# Patient Record
Sex: FEMALE | Race: WHITE | Employment: UNEMPLOYED | URBAN - METROPOLITAN AREA
[De-identification: names, ages, dates, MRNs, and addresses within clinical notes are randomized per-mention and may not be internally consistent; named-entity substitution may affect disease eponyms.]

---

## 2017-02-06 ENCOUNTER — CARE COORDINATION (OUTPATIENT)
Dept: CARE COORDINATION | Facility: CLINIC | Age: 57
End: 2017-02-06

## 2017-02-06 NOTE — PROGRESS NOTES
Clinic Care Coordination Contact  [facility]/Voicemail    Referral Source: CTS  Clinical Data: Care Coordinator Outreach  Outreach attempted x 1.  Left message on voicemail with call back information and requested return call.  Plan: Care Coordinator reminded Hanna in the voice message that she had selected Dr Rust as her PCP but she missed the appt 1/12/17 and hasn't called in to reschedule. Care Coordinator will outreach 1 more time and then change status to Inactive; Care Plan remains current.  .  Halima Sheldon [facility]  Clinic Care Coordinator-  Clinics: [place], [place], [place]  E-Mail: [email or web address]  Telephone: [phone]

## 2017-02-13 ENCOUNTER — CARE COORDINATION (OUTPATIENT)
Dept: CARE COORDINATION | Facility: CLINIC | Age: 57
End: 2017-02-13

## 2017-02-13 NOTE — PROGRESS NOTES
Clinic Care Coordination Contact  [facility]/Voicemail    Referral Source: CTS  Clinical Data: Care Coordinator Outreach  Outreach attempted x 3.  Left message on voicemail with call back information and welcomed a return call in the future.  Plan: Care Coordinator SW changing status to Inactive. [facility] and 24 Hour Access Plan have been mailed.  Halima Sheldon [facility]  Clinic Care Coordinator-  Clinics: [facility], [place], [place]  E-Mail: [email or web address]  Telephone: [phone]

## 2017-06-17 ENCOUNTER — HEALTH MAINTENANCE LETTER (OUTPATIENT)
Age: 57
End: 2017-06-17

## 2018-09-17 ENCOUNTER — HOSPITAL ENCOUNTER (EMERGENCY)
Facility: CLINIC | Age: 58
Discharge: HOME | End: 2018-09-17
Attending: EMERGENCY MEDICINE | Admitting: EMERGENCY MEDICINE
Payer: COMMERCIAL

## 2018-09-17 VITALS
TEMPERATURE: 98 F | HEIGHT: 71 IN | DIASTOLIC BLOOD PRESSURE: 95 MMHG | WEIGHT: 293 LBS | OXYGEN SATURATION: 91 % | RESPIRATION RATE: 22 BRPM | BODY MASS INDEX: 41.02 KG/M2 | SYSTOLIC BLOOD PRESSURE: 134 MMHG

## 2018-09-17 DIAGNOSIS — F33.1 MAJOR DEPRESSIVE DISORDER, RECURRENT EPISODE, MODERATE (H): ICD-10-CM

## 2018-09-17 DIAGNOSIS — F10.20 ALCOHOLISM (H): ICD-10-CM

## 2018-09-17 LAB
ALBUMIN SERPL-MCNC: 2.1 G/DL (ref 3.4–5)
ALBUMIN UR-MCNC: 100 MG/DL
ALP SERPL-CCNC: 210 U/L (ref 40–150)
ALT SERPL W P-5'-P-CCNC: 151 U/L (ref 0–50)
ANION GAP SERPL CALCULATED.3IONS-SCNC: 11 MMOL/L (ref 3–14)
APPEARANCE UR: ABNORMAL
AST SERPL W P-5'-P-CCNC: 232 U/L (ref 0–45)
BACTERIA #/AREA URNS HPF: ABNORMAL /HPF
BASOPHILS # BLD AUTO: 0 10E9/L (ref 0–0.2)
BASOPHILS NFR BLD AUTO: 0.1 %
BILIRUB DIRECT SERPL-MCNC: 0.5 MG/DL (ref 0–0.2)
BILIRUB SERPL-MCNC: 1.5 MG/DL (ref 0.2–1.3)
BILIRUB UR QL STRIP: ABNORMAL
BUN SERPL-MCNC: 24 MG/DL (ref 7–30)
CALCIUM SERPL-MCNC: 8.2 MG/DL (ref 8.5–10.1)
CHLORIDE SERPL-SCNC: 100 MMOL/L (ref 94–109)
CO2 SERPL-SCNC: 29 MMOL/L (ref 20–32)
COLOR UR AUTO: ABNORMAL
CREAT SERPL-MCNC: 0.9 MG/DL (ref 0.52–1.04)
DIFFERENTIAL METHOD BLD: ABNORMAL
EOSINOPHIL # BLD AUTO: 0 10E9/L (ref 0–0.7)
EOSINOPHIL NFR BLD AUTO: 0.3 %
ERYTHROCYTE [DISTWIDTH] IN BLOOD BY AUTOMATED COUNT: 15.6 % (ref 10–15)
GFR SERPL CREATININE-BSD FRML MDRD: 65 ML/MIN/1.7M2
GLUCOSE SERPL-MCNC: 151 MG/DL (ref 70–99)
GLUCOSE UR STRIP-MCNC: NEGATIVE MG/DL
HCT VFR BLD AUTO: 50 % (ref 35–47)
HGB BLD-MCNC: 16.3 G/DL (ref 11.7–15.7)
HGB UR QL STRIP: ABNORMAL
IMM GRANULOCYTES # BLD: 0.1 10E9/L (ref 0–0.4)
IMM GRANULOCYTES NFR BLD: 1.2 %
INR PPP: 0.97 (ref 0.86–1.14)
KETONES UR STRIP-MCNC: 5 MG/DL
LEUKOCYTE ESTERASE UR QL STRIP: ABNORMAL
LIPASE SERPL-CCNC: 124 U/L (ref 73–393)
LYMPHOCYTES # BLD AUTO: 1.1 10E9/L (ref 0.8–5.3)
LYMPHOCYTES NFR BLD AUTO: 12.7 %
MAGNESIUM SERPL-MCNC: 1 MG/DL (ref 1.6–2.3)
MCH RBC QN AUTO: 33.2 PG (ref 26.5–33)
MCHC RBC AUTO-ENTMCNC: 32.6 G/DL (ref 31.5–36.5)
MCV RBC AUTO: 102 FL (ref 78–100)
MONOCYTES # BLD AUTO: 0.8 10E9/L (ref 0–1.3)
MONOCYTES NFR BLD AUTO: 9.4 %
MUCOUS THREADS #/AREA URNS LPF: PRESENT /LPF
NEUTROPHILS # BLD AUTO: 6.6 10E9/L (ref 1.6–8.3)
NEUTROPHILS NFR BLD AUTO: 76.3 %
NITRATE UR QL: NEGATIVE
NRBC # BLD AUTO: 0 10*3/UL
NRBC BLD AUTO-RTO: 0 /100
PH UR STRIP: 6 PH (ref 5–7)
PLATELET # BLD AUTO: 228 10E9/L (ref 150–450)
POTASSIUM SERPL-SCNC: 4.4 MMOL/L (ref 3.4–5.3)
PROT SERPL-MCNC: 5.9 G/DL (ref 6.8–8.8)
RBC # BLD AUTO: 4.91 10E12/L (ref 3.8–5.2)
RBC #/AREA URNS AUTO: 0 /HPF (ref 0–2)
SODIUM SERPL-SCNC: 140 MMOL/L (ref 133–144)
SOURCE: ABNORMAL
SP GR UR STRIP: 1.03 (ref 1–1.03)
SQUAMOUS #/AREA URNS AUTO: 7 /HPF (ref 0–1)
UROBILINOGEN UR STRIP-MCNC: 4 MG/DL (ref 0–2)
WBC # BLD AUTO: 8.6 10E9/L (ref 4–11)
WBC #/AREA URNS AUTO: 12 /HPF (ref 0–5)

## 2018-09-17 PROCEDURE — 99284 EMERGENCY DEPT VISIT MOD MDM: CPT | Mod: 25

## 2018-09-17 PROCEDURE — 83690 ASSAY OF LIPASE: CPT | Performed by: EMERGENCY MEDICINE

## 2018-09-17 PROCEDURE — 85025 COMPLETE CBC W/AUTO DIFF WBC: CPT | Performed by: EMERGENCY MEDICINE

## 2018-09-17 PROCEDURE — 96366 THER/PROPH/DIAG IV INF ADDON: CPT

## 2018-09-17 PROCEDURE — 80048 BASIC METABOLIC PNL TOTAL CA: CPT | Performed by: EMERGENCY MEDICINE

## 2018-09-17 PROCEDURE — 85610 PROTHROMBIN TIME: CPT | Performed by: EMERGENCY MEDICINE

## 2018-09-17 PROCEDURE — 87086 URINE CULTURE/COLONY COUNT: CPT | Performed by: EMERGENCY MEDICINE

## 2018-09-17 PROCEDURE — 83735 ASSAY OF MAGNESIUM: CPT | Performed by: EMERGENCY MEDICINE

## 2018-09-17 PROCEDURE — 81001 URINALYSIS AUTO W/SCOPE: CPT | Performed by: EMERGENCY MEDICINE

## 2018-09-17 PROCEDURE — 25000128 H RX IP 250 OP 636: Performed by: EMERGENCY MEDICINE

## 2018-09-17 PROCEDURE — 96365 THER/PROPH/DIAG IV INF INIT: CPT

## 2018-09-17 PROCEDURE — 80076 HEPATIC FUNCTION PANEL: CPT | Performed by: EMERGENCY MEDICINE

## 2018-09-17 PROCEDURE — 25000132 ZZH RX MED GY IP 250 OP 250 PS 637: Performed by: EMERGENCY MEDICINE

## 2018-09-17 RX ORDER — BENZOCAINE/MENTHOL 6 MG-10 MG
LOZENGE MUCOUS MEMBRANE 2 TIMES DAILY
Qty: 14 G | Refills: 0 | Status: ON HOLD | OUTPATIENT
Start: 2018-09-17 | End: 2019-01-01

## 2018-09-17 RX ORDER — LORAZEPAM 2 MG/1
2 TABLET ORAL ONCE
Status: COMPLETED | OUTPATIENT
Start: 2018-09-17 | End: 2018-09-17

## 2018-09-17 RX ORDER — MAGNESIUM SULFATE HEPTAHYDRATE 40 MG/ML
2 INJECTION, SOLUTION INTRAVENOUS ONCE
Status: DISCONTINUED | OUTPATIENT
Start: 2018-09-17 | End: 2018-09-18 | Stop reason: HOSPADM

## 2018-09-17 RX ORDER — LOPERAMIDE HYDROCHLORIDE 2 MG/1
TABLET ORAL
Qty: 30 TABLET | Refills: 0 | Status: ON HOLD | OUTPATIENT
Start: 2018-09-17 | End: 2019-01-01

## 2018-09-17 RX ORDER — CEFTRIAXONE 1 G/1
1 INJECTION, POWDER, FOR SOLUTION INTRAMUSCULAR; INTRAVENOUS ONCE
Status: COMPLETED | OUTPATIENT
Start: 2018-09-17 | End: 2018-09-17

## 2018-09-17 RX ORDER — NITROFURANTOIN 25; 75 MG/1; MG/1
100 CAPSULE ORAL 2 TIMES DAILY
Qty: 14 CAPSULE | Refills: 0 | Status: ON HOLD | OUTPATIENT
Start: 2018-09-17 | End: 2019-01-01

## 2018-09-17 RX ORDER — ZINC OXIDE 20 %
OINTMENT (GRAM) TOPICAL
Status: DISCONTINUED | OUTPATIENT
Start: 2018-09-17 | End: 2018-09-18 | Stop reason: HOSPADM

## 2018-09-17 RX ORDER — ZINC OXIDE
OINTMENT (GRAM) TOPICAL PRN
Qty: 113 G | Refills: 1 | Status: ON HOLD | OUTPATIENT
Start: 2018-09-17 | End: 2019-01-01

## 2018-09-17 RX ADMIN — ZINC OXIDE: 200 OINTMENT TOPICAL at 20:07

## 2018-09-17 RX ADMIN — SODIUM CHLORIDE 1000 ML: 9 INJECTION, SOLUTION INTRAVENOUS at 19:21

## 2018-09-17 RX ADMIN — LORAZEPAM 2 MG: 2 TABLET ORAL at 19:21

## 2018-09-17 RX ADMIN — CEFTRIAXONE SODIUM 1 G: 1 INJECTION, POWDER, FOR SOLUTION INTRAMUSCULAR; INTRAVENOUS at 21:10

## 2018-09-17 ASSESSMENT — ENCOUNTER SYMPTOMS
RECTAL PAIN: 1
SHORTNESS OF BREATH: 0
DYSURIA: 0
FREQUENCY: 1
HEMATURIA: 1
NERVOUS/ANXIOUS: 1
DIARRHEA: 1

## 2018-09-17 NOTE — ED PROVIDER NOTES
History     Chief Complaint:  Rectal pain, hematuria, and anxiety    HPI   Hanna Valle is a 58 year old female who presents with rectal pain and diarrhea, hematuria, and anxiety. The patient states that ever since she had a colon resection several years ago, she has been having soft stools that have become progressively more frequent over the past one year and she even has some uncontrollable anal leakage. She notes having anywhere from 2-15 loose bowel movements per day that vary in size. Lately, the patient has also felt rectal pain with wiping and thinks there must be open sores in her rectal area. She first noticed hematuria last night and then again this morning. The patient states she has also had decreased urine output/frequency. She denies dysuria.     The patient states that she is upset with her  as he has recently started using drugs again. She has had increased anxiety relating to this and states she decided to drink alcohol heavily last week for several days to cope. Her last drink was 5 days ago. She notes having a history of alcohol abuse when, but has never had withdrawal seizures. The patient states that she and her  argue frequently at home, especially given his return to drug use. She feels physically safe at home, but does want resources to help her with her marriage. The patient used to take Celexa for her anxiety, but is not currently on any medications. She feels lightheaded, but states this might be related to her not eating much today. No chest pain or shortness of breath.     Allergies:  Clindamycin  Lisinopril      Medications:    albuterol (2.5 MG/3ML) 0.083% nebulizer solution  albuterol (PROAIR HFA, PROVENTIL HFA, VENTOLIN HFA) 108 (90 BASE) MCG/ACT inhaler  amLODIPine (NORVASC) 10 MG tablet  citalopram (CELEXA) 20 MG tablet  folic acid (FOLVITE) 1 MG tablet  guaiFENesin-dextromethorphan (ROBITUSSIN DM) 100-10 MG/5ML syrup  miconazole (MICATIN; MICRO GUARD) 2 %  "powder  Omeprazole (PRILOSEC PO)  thiamine 100 MG tablet    Past Medical History:    Alcoholism   Gastric ulcers with perforation  Small bowel obstruction   Pneumatosis of intestines   Cardiomyopathy   Asthma   Hypertension   Hyperlipidemia   Major depression  Obesity    Type II diabetes mellitus     Past Surgical History:    Colectomy without colostomy   EGD combined  Right ankle fracture repair x2  I&D abdomen x2  Gastrojejunostomy tube  Exploratory laparotomy abdomen   Small bowel resection without ostomy x2  Salpingo-oophorectomy   Splenectomy   Tracheostomy     Family History:    Heart disease   Psychotic disorder     Social History:  The patient was accompanied to the ED by .  Smoking Status: current, 1 ppd  Alcohol Use: yes    Marital Status:   [2]     Review of Systems   Respiratory: Negative for shortness of breath.    Cardiovascular: Negative for chest pain.   Gastrointestinal: Positive for diarrhea and rectal pain.   Genitourinary: Positive for decreased urine volume, frequency (decreased) and hematuria. Negative for dysuria.   Psychiatric/Behavioral: The patient is nervous/anxious.    All other systems reviewed and are negative.    Physical Exam     Patient Vitals for the past 24 hrs:   BP Temp Temp src Heart Rate Resp SpO2 Height Weight   09/17/18 2152 (!) 134/95 - - 98 22 - - -   09/17/18 1822 162/86 98  F (36.7  C) Temporal 121 - 91 % 1.803 m (5' 11\") 136.1 kg (300 lb)      Physical Exam  Constitutional: Well developed, obese, somewhat unkempt, nontox appearance  Head: Atraumatic.   Mouth/Throat: Oropharynx is clear and moist.   Neck:  no stridor  Eyes: no scleral icterus  Cardiovascular: RRR, 2+ bilat radial, DP pulses  Pulmonary/Chest: nml resp effort, Clear BS bilat  Abdominal: ND, +BS, soft, NT, no rebound or guarding   : no CVA tenderness bilat  Ext: Warm, well perfused, no edema  Neurological: A&O, symmetric facies, moves ext x4  Skin: Skin is warm and dry. Significant skin " breakdown, erythema, somewhat irritated/macerated tissue to buttocks  Psychiatric: Behavior is normal. Thought content normal.   Nursing note and vitals reviewed.    Emergency Department Course     Laboratory:  Laboratory findings were communicated with the patient who voiced understanding of the findings.    INR: 0.97   CBC: WBC 8.6, HGB 16.3 (H),   BMP: Ca 8.2 (L), Glucose 151 (H) o/w WNL (Creatinine 0.90)  Hepatic panel: Bili Direct 0.5 (H), Bili Total 1.5 (H), Albumin 2.1 (L), Protein Total 5.9 (L), Alkphos 210 (H),  (H),  (H).  Magnesium: 1.0 (L)  Lipase: 124      UA with micro: SARAH large (A), Bacteria many (A), Urinebilin Moderate (A), Urobilinogen 4.0 (H), WBC 12 (H), Albumin 100 (A), blood trace (A), squamous epithelial 7 (H), Ketones 5 (A), mucous present (A) o/w negative     Urine Culture: Pending     Interventions:  1921 NS, 1 L, IV   1921 Lorazepam, 2 mg, IV injection   2007 Zinc oxide 20% topical   2110 Rocephin, 1 g, IV injection       Emergency Department Course:  Nursing notes and vitals reviewed.  I entered the room.  I performed an exam of the patient as documented above.     Rectal exam was performed here in the emergency department with female chaperone at bedside.     IV was inserted and blood was drawn for laboratory testing, results above.    The patient provided a urine sample here in the emergency department. This was sent for laboratory testing, findings above.     The patient received the above intervention(s).     2135 the patient was rechecked and updated regarding the results of the laboratory and imaging studies.      I discussed the treatment plan with the patient. They expressed understanding of this plan and consented to discharge. They will be discharged home with instructions for care and follow up. In addition, the patient will return to the emergency department if their symptoms worsen, if new symptoms arise or if there is any concern.  All questions were  answered.     Impression & Plan      Medical Decision Making:  Hanna Valle is a 58-year-old female presenting with multiple complaints although primarily diarrhea, possibly hematuria, anal discomfort. DDx includes UTI, alcohol dependence, alcohol abuse, electrolyte abnormality, hepatitis, pancreatitis, IBS, diarrhea NOS.  Patient's diarrhea seems less likely to be infectious in etiology given its been going on for greater than 1 year and is progressively getting worse.  The patient's recent alcohol use likely exacerbated her baseline loose stools.  She is somewhat poorly kempt and has significant skin breakdown but no obvious cellulitis to her buttocks on physical exam.  There are no obvious perianal or perirectal abscesses on physical exam.  She has a significant amount of abraded tissue perianally which is likely causing her discomfort w/ the multiple bowel movements she is having.  Labs were significant for elevated LFTs likely secondary to acholic hepatitis given the patient's recent alcohol use.  She is not vomiting nor does she have any abdominal pain which making biliary obstruction unlikely.  UA showed bacteria, blood and WBCs although is contaminated with squamosus cells therefore urine culture was placed and the patient was empirically started on antibiotics as noted above.  Recommended magnesium repletion given hypomagnesemia with the patient refused stating that she would prefer to go home.  Doubt acute withdrawal, DTs, high risk for seizure given her last drink was more than 5 days ago.  She denies any thoughts of self-harm or suicide.  I recommended that she follow-up with her primary care doctor in 3 days for reevaluation.  I placed an outpatient order for a  consult.  The patient reports that she feels physically safe at home but not emotionally cared for by her .  Prescriptions were given as noted below.  Recommendations were given regarding follow-up in the emergency  department as needed for new or worsening symptoms.  The patient was counseled on results, diagnosis and disposition.  She is understanding and agreeable to plan.  She is subsequently discharged in stable condition having refused her IV magnesium repletion.    Diagnosis:    ICD-10-CM    1. Alcoholism (H) F10.20 -------   LIST  --   2. Major depressive disorder, recurrent episode, moderate (H) F33.1 -------   LIST  --     Disposition:   The patient was discharged to home.    Discharge Medications:  Discharge Medication List as of 9/17/2018 10:02 PM      START taking these medications    Details   hydrocortisone (PREPARATION H HYDROCORTISONE) 1 % cream Apply topically 2 times dailyDisp-14 g, R-0Local Print      loperamide (IMODIUM A-D) 2 MG tablet Take 2 tabs (4 mg) after first loose stool, and then take one tab (2 mg) after each diarrheal stool.  Max of 8 tabs (16 mg) per day., Disp-30 tablet, R-0, Local Print      Magnesium Chloride 70 MG TBEC Take 2 tablets by mouth daily for 14 days, Disp-28 tablet, R-0, Local Print      nitroFURantoin, macrocrystal-monohydrate, (MACROBID) 100 MG capsule Take 1 capsule (100 mg) by mouth 2 times daily, Disp-14 capsule, R-0, Local Print      zinc oxide (DESITIN) 40 % ointment Apply topically as needed for dry skin, irritation or skin protectionDisp-113 g, R-1Local Print           Scribe Disclosure:  I, Matt Arora, am serving as a scribe at 6:44 PM on 9/17/2018 to document services personally performed by Alirio Aguirre MD, based on my observations and the provider's statements to me.    EMERGENCY DEPARTMENT       Alirio Aguirre MD  09/18/18 2063
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
## 2018-09-17 NOTE — ED AVS SNAPSHOT
Emergency Department    [address] [facility] [address]    Phone:  [phone]    Fax:  [phone]                                       Hanna Valle   MRN: [id number]    Department:   Emergency Department   Date of Visit:  9/17/2018           After Visit Summary Signature Page     I have received my discharge instructions, and my questions have been answered. I have discussed any challenges I see with this plan with the nurse or doctor.    ..........................................................................................................................................  Patient/Patient Representative Signature      ..........................................................................................................................................  Patient Representative Print Name and Relationship to Patient    ..................................................               ................................................  Date                                   Time    ..........................................................................................................................................  Reviewed by Signature/Title    ...................................................              ..............................................  Date                                               Time          22EPIC Rev 08/18

## 2018-09-18 LAB
BACTERIA SPEC CULT: NORMAL
Lab: NORMAL
SPECIMEN SOURCE: NORMAL

## 2018-09-18 NOTE — DISCHARGE INSTRUCTIONS
Take the below medications as prescribed.      New Prescriptions    HYDROCORTISONE (PREPARATION H HYDROCORTISONE) 1 % CREAM    Apply topically 2 times daily    LOPERAMIDE (IMODIUM A-D) 2 MG TABLET    Take 2 tabs (4 mg) after first loose stool, and then take one tab (2 mg) after each diarrheal stool.  Max of 8 tabs (16 mg) per day.    NITROFURANTOIN, MACROCRYSTAL-MONOHYDRATE, (MACROBID) 100 MG CAPSULE    Take 1 capsule (100 mg) by mouth 2 times daily    ZINC OXIDE (DESITIN) 40 % OINTMENT    Apply topically as needed for dry skin, irritation or skin protection       Drink plenty of fluids.    Please follow-up with your primary doctor within the week.    Please return to the emergency department as needed for new or worsening symptoms including fever greater than 100.4 F, vomiting and unable to keep anything down, thoughts of self-harm or harming others, any other concerning symptoms.        Discharge Instructions  Urinary Tract Infection  You or your child have been diagnosed with a urinary tract infection, or UTI. The urinary tract includes the kidneys (which make urine/pee), ureters (the tubes that carry urine/pee from the kidneys to the bladder), the bladder (which stores urine/pee), and urethra (the tube that carries urine/pee out of the bladder). Urinary tract infections occur when bacteria travel up the urethra into the bladder (bladder infection) and, in some cases, from there into the kidneys (kidney infection).  Generally, every Emergency Department visit should have a follow-up clinic visit with either a primary or a specialty clinic/provider. Please follow-up as instructed by your emergency provider today.  Return to the Emergency Department if:    You or your child have severe back pain.    You or your child are vomiting (throwing up) so that you cannot take your medicine.    You or your child have a new fever (had not previously had a fever) over 101 F.    You or your child have confusion or are very  weak, or feel very ill.    Your child seems much more ill, will not wake up, will not respond right, or is crying for a long time and will not calm down.    You or your child are showing signs of dehydration. These signs may include decreased urination (pee), dry mouth/gums/tongue, or decreased activity.    Follow-up with your provider:     Children under 24 months need to be seen by their regular provider within one week after a diagnosis of a UTI. It may be necessary to do some more tests to look at the child s kidney or bladder.    You should begin to feel better within 24 - 48 hours of starting your antibiotic; follow-up with your regular clinic/doctor/provider if this is not the case.    Treatment:     You will be treated with an antibiotic to kill the bacteria. We have to make an educated guess, based on what we know about common bacteria and antibiotics, as to which antibiotic will work for your infection. We will be correct most times but there will be some cases where the antibiotic chosen is not correct (see urine cultures below).    Take a pain medication such as acetaminophen (Tylenol ) or ibuprofen (Advil , Motrin , Nuprin ).    Phenazopyridine (Pyridium , Uristat ) is a prescription medication that numbs the bladder to reduce the burning pain of some UTIs.  The same medication is available in a non-prescription version (Azo-Standard , Urodol ). This medication will change the color of the urine and tears (usually blue or orange). If you wear contacts, do not wear them while taking this medication as they may be stained by the medication.    Urine Cultures:    If indicated, a urine culture may have been performed today. This test generally takes 24-48 hours to complete so the results are not known at this time. The results can confirm that an infection is present but also determine which antibiotic is effective for the specific bacteria that is causing the infection. If your urine culture shows that the  "antibiotic you were given today will not work to treat your infection, we will attempt to contact you to make arrangements to change the antibiotic. If the culture confirms that the antibiotic is effective for your infection, you will not be contacted. We often recommend follow-up with your regular physician/provider on the culture results regardless of this process.    Antibiotic Warning:     If you have been placed on antibiotics - watch for signs of allergic reaction.  These include rash, lip swelling, difficulty breathing, wheezing, and dizziness.  If you develop any of these symptoms, stop the antibiotic immediately and go to an emergency room or urgent care for evaluation.    Probiotics: If you have been given an antibiotic, you may want to also take a probiotic pill or eat yogurt with live cultures. Probiotics have \"good bacteria\" to help your intestines stay healthy. Studies have shown that probiotics help prevent diarrhea and other intestine problems (including C. diff infection) when you take antibiotics. You can buy these without a prescription in the pharmacy section of the store.   If you were given a prescription for medicine here today, be sure to read all of the information (including the package insert) that comes with your prescription.  This will include important information about the medicine, its side effects, and any warnings that you need to know about.  The pharmacist who fills the prescription can provide more information and answer questions you may have about the medicine.  If you have questions or concerns that the pharmacist cannot address, please call or return to the Emergency Department.   Remember that you can always come back to the Emergency Department if you are not able to see your regular provider in the amount of time listed above, if you get any new symptoms, or if there is anything that worries you.          Discharge Instructions  Adult Diarrhea    You have been seen today " for diarrhea (loose stools). This is usually caused by a virus, but some bacteria, parasites, medicines, or other medical conditions can cause similar symptoms. At this time your provider does not find that your diarrhea is a sign of anything dangerous or life-threatening. However, sometimes the signs of serious illness do not show up right away. If you have new or worse symptoms, you may need to be seen again in the Emergency Department or by your primary provider.     Generally, every Emergency Department visit should have a follow-up clinic visit with either a primary or a specialty clinic/provider. Please follow-up as instructed by your emergency provider today.    Return to the Emergency Department if:    You feel you are getting dehydrated, such as being very thirsty, not urinating (peeing) like usual, or feeling faint or lightheaded.     You develop a new fever.    You have abdominal (belly) pain that seems worse than cramps, is in one spot, or is getting worse over time.     You have blood in your stool or your stool becomes black.  (Remember that if you take Pepto-Bismol , this will turn your stool black).     You feel very weak.    What can I do to help myself?    The most important thing to do is to drink clear liquids.   It is best to have only small, frequent sips of liquids. Drinking too much at once may cause more diarrhea. You should also replace minerals, sodium and potassium lost with diarrhea. Pedialyte  and sports drinks can help you replace these minerals. You can also drink clear liquids such as water, weak tea, apple juice, and 7-Up . Avoid acidic liquids (orange juice), caffeine (coffee) or alcohol. Milk products will make the diarrhea worse.    Eat bland (plain) foods. Soda crackers, toast, plain noodles, gelatin, applesauce and bananas are good first choices. Avoid foods that have acid, are spicy, fatty or fibrous (such as meats, coarse grains, vegetables). You may start eating these foods  "again in about 3 days when you are better.     Sometimes treatment includes prescription medicine to prevent diarrhea. If your provider prescribes these for you, take them as directed.     Nonprescription medicine is available for the treatment of diarrhea and can be very effective. If you use it, make sure you use the dose recommended on the package. Check with your healthcare provider before you use any medicine for diarrhea.     Do not take ibuprofen, or other nonsteroidal anti-inflammatory medicines, without checking with your healthcare provider.   Probiotics: If you have been given an antibiotic, you may want to also take a probiotic pill or eat yogurt with live cultures. Probiotics have \"good bacteria\" to help your intestines stay healthy. Studies have shown that probiotics help prevent diarrhea and other intestine problems (including C. diff infection) when you take antibiotics. You can buy these without a prescription in the pharmacy section of the store.   If you were given a prescription for medicine here today, be sure to read all of the information (including the package insert) that comes with your prescription.  This will include important information about the medicine, its side effects, and any warnings that you need to know about.  The pharmacist who fills the prescription can provide more information and answer questions you may have about the medicine.  If you have questions or concerns that the pharmacist cannot address, please call or return to the Emergency Department.  Remember that you can always come back to the Emergency Department if you are not able to see your regular provider in the amount of time listed above, if you get any new symptoms, or if there is anything that worries you.    "

## 2018-09-18 NOTE — ED NOTES
Writer attempted to cleanse left buttock, however patient did not tolerate this well.  Provider aware.

## 2018-09-18 NOTE — ED NOTES
Writer enters room to start magnesium and patient appears upset.  Patient states she is exhausted and wants to go home.  Patient requesting to be discharge. Provider notified

## 2019-01-01 ENCOUNTER — APPOINTMENT (OUTPATIENT)
Dept: GENERAL RADIOLOGY | Facility: CLINIC | Age: 59
End: 2019-01-01
Attending: INTERNAL MEDICINE
Payer: COMMERCIAL

## 2019-01-01 ENCOUNTER — APPOINTMENT (OUTPATIENT)
Dept: GENERAL RADIOLOGY | Facility: CLINIC | Age: 59
End: 2019-01-01
Attending: EMERGENCY MEDICINE
Payer: COMMERCIAL

## 2019-01-01 ENCOUNTER — APPOINTMENT (OUTPATIENT)
Dept: GENERAL RADIOLOGY | Facility: CLINIC | Age: 59
End: 2019-01-01
Payer: COMMERCIAL

## 2019-01-01 ENCOUNTER — ANESTHESIA EVENT (OUTPATIENT)
Dept: INTENSIVE CARE | Facility: CLINIC | Age: 59
End: 2019-01-01
Payer: COMMERCIAL

## 2019-01-01 ENCOUNTER — APPOINTMENT (OUTPATIENT)
Dept: CARDIOLOGY | Facility: CLINIC | Age: 59
End: 2019-01-01
Attending: HOSPITALIST
Payer: COMMERCIAL

## 2019-01-01 ENCOUNTER — PATIENT OUTREACH (OUTPATIENT)
Dept: CARE COORDINATION | Facility: CLINIC | Age: 59
End: 2019-01-01

## 2019-01-01 ENCOUNTER — APPOINTMENT (OUTPATIENT)
Dept: ULTRASOUND IMAGING | Facility: CLINIC | Age: 59
End: 2019-01-01
Attending: PHYSICIAN ASSISTANT
Payer: COMMERCIAL

## 2019-01-01 ENCOUNTER — APPOINTMENT (OUTPATIENT)
Dept: CT IMAGING | Facility: CLINIC | Age: 59
End: 2019-01-01
Attending: EMERGENCY MEDICINE
Payer: COMMERCIAL

## 2019-01-01 ENCOUNTER — HOSPITAL ENCOUNTER (INPATIENT)
Facility: CLINIC | Age: 59
LOS: 6 days | End: 2019-12-04
Attending: EMERGENCY MEDICINE | Admitting: HOSPITALIST
Payer: COMMERCIAL

## 2019-01-01 ENCOUNTER — ANESTHESIA (OUTPATIENT)
Dept: INTENSIVE CARE | Facility: CLINIC | Age: 59
End: 2019-01-01
Payer: COMMERCIAL

## 2019-01-01 ENCOUNTER — APPOINTMENT (OUTPATIENT)
Dept: CARDIOLOGY | Facility: CLINIC | Age: 59
End: 2019-01-01
Attending: INTERNAL MEDICINE
Payer: COMMERCIAL

## 2019-01-01 DIAGNOSIS — R09.02 HYPOXIA: ICD-10-CM

## 2019-01-01 DIAGNOSIS — J18.9 PNEUMONIA DUE TO INFECTIOUS ORGANISM, UNSPECIFIED LATERALITY, UNSPECIFIED PART OF LUNG: ICD-10-CM

## 2019-01-01 DIAGNOSIS — A41.9 SEPTIC SHOCK (H): ICD-10-CM

## 2019-01-01 DIAGNOSIS — J90 PLEURAL EFFUSION: ICD-10-CM

## 2019-01-01 DIAGNOSIS — E87.5 HYPERKALEMIA: ICD-10-CM

## 2019-01-01 DIAGNOSIS — I27.20 PULMONARY HYPERTENSION (H): ICD-10-CM

## 2019-01-01 DIAGNOSIS — N17.9 ACUTE RENAL FAILURE, UNSPECIFIED ACUTE RENAL FAILURE TYPE (H): ICD-10-CM

## 2019-01-01 DIAGNOSIS — R65.21 SEPTIC SHOCK (H): ICD-10-CM

## 2019-01-01 DIAGNOSIS — J96.02 ACUTE RESPIRATORY FAILURE WITH HYPERCAPNIA (H): ICD-10-CM

## 2019-01-01 LAB
ABO + RH BLD: NORMAL
ABO + RH BLD: NORMAL
ALBUMIN SERPL-MCNC: 2 G/DL (ref 3.4–5)
ALBUMIN SERPL-MCNC: 2.1 G/DL (ref 3.4–5)
ALBUMIN SERPL-MCNC: 2.1 G/DL (ref 3.4–5)
ALBUMIN SERPL-MCNC: 2.2 G/DL (ref 3.4–5)
ALBUMIN SERPL-MCNC: 2.8 G/DL (ref 3.4–5)
ALBUMIN SERPL-MCNC: 3.2 G/DL (ref 3.4–5)
ALBUMIN UR-MCNC: 100 MG/DL
ALBUMIN UR-MCNC: 100 MG/DL
ALP SERPL-CCNC: 113 U/L (ref 40–150)
ALP SERPL-CCNC: 115 U/L (ref 40–150)
ALP SERPL-CCNC: 147 U/L (ref 40–150)
ALP SERPL-CCNC: 163 U/L (ref 40–150)
ALP SERPL-CCNC: 75 U/L (ref 40–150)
ALP SERPL-CCNC: 76 U/L (ref 40–150)
ALT SERPL W P-5'-P-CCNC: 28 U/L (ref 0–50)
ALT SERPL W P-5'-P-CCNC: 33 U/L (ref 0–50)
ALT SERPL W P-5'-P-CCNC: 42 U/L (ref 0–50)
ALT SERPL W P-5'-P-CCNC: 61 U/L (ref 0–50)
ALT SERPL W P-5'-P-CCNC: 70 U/L (ref 0–50)
ALT SERPL W P-5'-P-CCNC: 82 U/L (ref 0–50)
ANION GAP SERPL CALCULATED.3IONS-SCNC: 1 MMOL/L (ref 3–14)
ANION GAP SERPL CALCULATED.3IONS-SCNC: 3 MMOL/L (ref 3–14)
ANION GAP SERPL CALCULATED.3IONS-SCNC: 4 MMOL/L (ref 3–14)
ANION GAP SERPL CALCULATED.3IONS-SCNC: 5 MMOL/L (ref 3–14)
ANION GAP SERPL CALCULATED.3IONS-SCNC: 5 MMOL/L (ref 3–14)
ANION GAP SERPL CALCULATED.3IONS-SCNC: 7 MMOL/L (ref 3–14)
ANION GAP SERPL CALCULATED.3IONS-SCNC: 8 MMOL/L (ref 3–14)
ANION GAP SERPL CALCULATED.3IONS-SCNC: NORMAL MMOL/L (ref 6–17)
ANISOCYTOSIS BLD QL SMEAR: SLIGHT
APPEARANCE UR: ABNORMAL
APPEARANCE UR: CLEAR
APTT PPP: 31 SEC (ref 22–37)
AST SERPL W P-5'-P-CCNC: 126 U/L (ref 0–45)
AST SERPL W P-5'-P-CCNC: 213 U/L (ref 0–45)
AST SERPL W P-5'-P-CCNC: 217 U/L (ref 0–45)
AST SERPL W P-5'-P-CCNC: 25 U/L (ref 0–45)
AST SERPL W P-5'-P-CCNC: 256 U/L (ref 0–45)
AST SERPL W P-5'-P-CCNC: 30 U/L (ref 0–45)
BACTERIA #/AREA URNS HPF: ABNORMAL /HPF
BACTERIA #/AREA URNS HPF: ABNORMAL /HPF
BACTERIA SPEC CULT: ABNORMAL
BACTERIA SPEC CULT: NO GROWTH
BACTERIA SPEC CULT: NO GROWTH
BASE DEFICIT BLDA-SCNC: 1.9 MMOL/L
BASE DEFICIT BLDA-SCNC: 4.2 MMOL/L
BASE DEFICIT BLDV-SCNC: 2.9 MMOL/L
BASE EXCESS BLDA CALC-SCNC: 0 MMOL/L
BASE EXCESS BLDA CALC-SCNC: 1.4 MMOL/L
BASE EXCESS BLDA CALC-SCNC: 11.6 MMOL/L
BASE EXCESS BLDA CALC-SCNC: 3.5 MMOL/L
BASE EXCESS BLDA CALC-SCNC: 4 MMOL/L
BASE EXCESS BLDA CALC-SCNC: 4.3 MMOL/L
BASE EXCESS BLDA CALC-SCNC: 5 MMOL/L
BASE EXCESS BLDA CALC-SCNC: 5.6 MMOL/L
BASE EXCESS BLDA CALC-SCNC: 5.7 MMOL/L
BASE EXCESS BLDA CALC-SCNC: 6.6 MMOL/L
BASE EXCESS BLDA CALC-SCNC: 8.8 MMOL/L
BASE EXCESS BLDV CALC-SCNC: 10.3 MMOL/L
BASE EXCESS BLDV CALC-SCNC: 10.4 MMOL/L
BASE EXCESS BLDV CALC-SCNC: 12.9 MMOL/L
BASE EXCESS BLDV CALC-SCNC: 14.1 MMOL/L
BASOPHILS # BLD AUTO: 0 10E9/L (ref 0–0.2)
BASOPHILS NFR BLD AUTO: 0 %
BASOPHILS NFR BLD AUTO: 0 %
BASOPHILS NFR BLD AUTO: 0.1 %
BASOPHILS NFR BLD AUTO: 0.2 %
BILIRUB DIRECT SERPL-MCNC: 0.2 MG/DL (ref 0–0.2)
BILIRUB SERPL-MCNC: 0.3 MG/DL (ref 0.2–1.3)
BILIRUB SERPL-MCNC: 0.5 MG/DL (ref 0.2–1.3)
BILIRUB SERPL-MCNC: 0.6 MG/DL (ref 0.2–1.3)
BILIRUB SERPL-MCNC: 0.9 MG/DL (ref 0.2–1.3)
BILIRUB UR QL STRIP: ABNORMAL
BILIRUB UR QL STRIP: NEGATIVE
BLD GP AB SCN SERPL QL: NORMAL
BLOOD BANK CMNT PATIENT-IMP: NORMAL
BUN SERPL-MCNC: 43 MG/DL (ref 7–30)
BUN SERPL-MCNC: 45 MG/DL (ref 7–30)
BUN SERPL-MCNC: 45 MG/DL (ref 7–30)
BUN SERPL-MCNC: 46 MG/DL (ref 7–30)
BUN SERPL-MCNC: 46 MG/DL (ref 7–30)
BUN SERPL-MCNC: 47 MG/DL (ref 7–30)
BUN SERPL-MCNC: 49 MG/DL (ref 7–30)
BUN SERPL-MCNC: 49 MG/DL (ref 7–30)
BUN SERPL-MCNC: 50 MG/DL (ref 7–30)
BUN SERPL-MCNC: 50 MG/DL (ref 7–30)
BUN SERPL-MCNC: 52 MG/DL (ref 7–30)
BUN SERPL-MCNC: 54 MG/DL (ref 7–30)
BUN SERPL-MCNC: 61 MG/DL (ref 7–30)
BUN SERPL-MCNC: 63 MG/DL (ref 7–30)
BUN SERPL-MCNC: NORMAL MG/DL (ref 7–30)
CA-I SERPL ISE-MCNC: 4.1 MG/DL (ref 4.4–5.2)
CALCIUM SERPL-MCNC: 6.4 MG/DL (ref 8.5–10.1)
CALCIUM SERPL-MCNC: 7.6 MG/DL (ref 8.5–10.1)
CALCIUM SERPL-MCNC: 7.7 MG/DL (ref 8.5–10.1)
CALCIUM SERPL-MCNC: 7.9 MG/DL (ref 8.5–10.1)
CALCIUM SERPL-MCNC: 8 MG/DL (ref 8.5–10.1)
CALCIUM SERPL-MCNC: 8.1 MG/DL (ref 8.5–10.1)
CALCIUM SERPL-MCNC: 8.1 MG/DL (ref 8.5–10.1)
CALCIUM SERPL-MCNC: 8.2 MG/DL (ref 8.5–10.1)
CALCIUM SERPL-MCNC: 8.4 MG/DL (ref 8.5–10.1)
CALCIUM SERPL-MCNC: 8.5 MG/DL (ref 8.5–10.1)
CALCIUM SERPL-MCNC: 8.5 MG/DL (ref 8.5–10.1)
CALCIUM SERPL-MCNC: 8.7 MG/DL (ref 8.5–10.1)
CALCIUM SERPL-MCNC: 8.9 MG/DL (ref 8.5–10.1)
CALCIUM SERPL-MCNC: 9.3 MG/DL (ref 8.5–10.1)
CALCIUM SERPL-MCNC: NORMAL MG/DL (ref 8.5–10.1)
CAOX CRY #/AREA URNS HPF: ABNORMAL /HPF
CHLORIDE SERPL-SCNC: 100 MMOL/L (ref 94–109)
CHLORIDE SERPL-SCNC: 101 MMOL/L (ref 94–109)
CHLORIDE SERPL-SCNC: 102 MMOL/L (ref 94–109)
CHLORIDE SERPL-SCNC: 103 MMOL/L (ref 94–109)
CHLORIDE SERPL-SCNC: 104 MMOL/L (ref 94–109)
CHLORIDE SERPL-SCNC: 104 MMOL/L (ref 94–109)
CHLORIDE SERPL-SCNC: 105 MMOL/L (ref 94–109)
CHLORIDE SERPL-SCNC: 105 MMOL/L (ref 94–109)
CHLORIDE SERPL-SCNC: 106 MMOL/L (ref 94–109)
CHLORIDE SERPL-SCNC: 113 MMOL/L (ref 94–109)
CHLORIDE SERPL-SCNC: NORMAL MMOL/L (ref 94–109)
CO2 BLDCOV-SCNC: 23 MMOL/L (ref 21–28)
CO2 BLDCOV-SCNC: 30 MMOL/L (ref 21–28)
CO2 BLDCOV-SCNC: 31 MMOL/L (ref 21–28)
CO2 BLDCOV-SCNC: 33 MMOL/L (ref 21–28)
CO2 SERPL-SCNC: 25 MMOL/L (ref 20–32)
CO2 SERPL-SCNC: 28 MMOL/L (ref 20–32)
CO2 SERPL-SCNC: 28 MMOL/L (ref 20–32)
CO2 SERPL-SCNC: 30 MMOL/L (ref 20–32)
CO2 SERPL-SCNC: 31 MMOL/L (ref 20–32)
CO2 SERPL-SCNC: 34 MMOL/L (ref 20–32)
CO2 SERPL-SCNC: 34 MMOL/L (ref 20–32)
CO2 SERPL-SCNC: 35 MMOL/L (ref 20–32)
CO2 SERPL-SCNC: 37 MMOL/L (ref 20–32)
CO2 SERPL-SCNC: 39 MMOL/L (ref 20–32)
CO2 SERPL-SCNC: 39 MMOL/L (ref 20–32)
CO2 SERPL-SCNC: 40 MMOL/L (ref 20–32)
CO2 SERPL-SCNC: NORMAL MMOL/L (ref 20–32)
COLOR UR AUTO: ABNORMAL
COLOR UR AUTO: YELLOW
CREAT BLD-MCNC: 2.5 MG/DL (ref 0.52–1.04)
CREAT SERPL-MCNC: 0.78 MG/DL (ref 0.52–1.04)
CREAT SERPL-MCNC: 0.87 MG/DL (ref 0.52–1.04)
CREAT SERPL-MCNC: 0.91 MG/DL (ref 0.52–1.04)
CREAT SERPL-MCNC: 0.94 MG/DL (ref 0.52–1.04)
CREAT SERPL-MCNC: 0.95 MG/DL (ref 0.52–1.04)
CREAT SERPL-MCNC: 0.99 MG/DL (ref 0.52–1.04)
CREAT SERPL-MCNC: 1.28 MG/DL (ref 0.52–1.04)
CREAT SERPL-MCNC: 1.38 MG/DL (ref 0.52–1.04)
CREAT SERPL-MCNC: 1.47 MG/DL (ref 0.52–1.04)
CREAT SERPL-MCNC: 1.57 MG/DL (ref 0.52–1.04)
CREAT SERPL-MCNC: 1.97 MG/DL (ref 0.52–1.04)
CREAT SERPL-MCNC: 2.02 MG/DL (ref 0.52–1.04)
CREAT SERPL-MCNC: 2.16 MG/DL (ref 0.52–1.04)
CREAT SERPL-MCNC: 2.36 MG/DL (ref 0.52–1.04)
CREAT SERPL-MCNC: NORMAL MG/DL (ref 0.52–1.04)
D DIMER PPP FEU-MCNC: 4.9 UG/ML FEU (ref 0–0.5)
DIFFERENTIAL METHOD BLD: ABNORMAL
EOSINOPHIL # BLD AUTO: 0 10E9/L (ref 0–0.7)
EOSINOPHIL NFR BLD AUTO: 0 %
EOSINOPHIL NFR BLD AUTO: 0.1 %
ERYTHROCYTE [DISTWIDTH] IN BLOOD BY AUTOMATED COUNT: 15.3 % (ref 10–15)
ERYTHROCYTE [DISTWIDTH] IN BLOOD BY AUTOMATED COUNT: 15.4 % (ref 10–15)
ERYTHROCYTE [DISTWIDTH] IN BLOOD BY AUTOMATED COUNT: 15.4 % (ref 10–15)
ERYTHROCYTE [DISTWIDTH] IN BLOOD BY AUTOMATED COUNT: 15.7 % (ref 10–15)
ERYTHROCYTE [DISTWIDTH] IN BLOOD BY AUTOMATED COUNT: 15.7 % (ref 10–15)
FLUAV+FLUBV AG SPEC QL: NEGATIVE
FLUAV+FLUBV AG SPEC QL: POSITIVE
GFR SERPL CREATININE-BSD FRML MDRD: 20 ML/MIN/{1.73_M2}
GFR SERPL CREATININE-BSD FRML MDRD: 22 ML/MIN/{1.73_M2}
GFR SERPL CREATININE-BSD FRML MDRD: 24 ML/MIN/{1.73_M2}
GFR SERPL CREATININE-BSD FRML MDRD: 27 ML/MIN/{1.73_M2}
GFR SERPL CREATININE-BSD FRML MDRD: 27 ML/MIN/{1.73_M2}
GFR SERPL CREATININE-BSD FRML MDRD: 36 ML/MIN/{1.73_M2}
GFR SERPL CREATININE-BSD FRML MDRD: 39 ML/MIN/{1.73_M2}
GFR SERPL CREATININE-BSD FRML MDRD: 42 ML/MIN/{1.73_M2}
GFR SERPL CREATININE-BSD FRML MDRD: 46 ML/MIN/{1.73_M2}
GFR SERPL CREATININE-BSD FRML MDRD: 63 ML/MIN/{1.73_M2}
GFR SERPL CREATININE-BSD FRML MDRD: 66 ML/MIN/{1.73_M2}
GFR SERPL CREATININE-BSD FRML MDRD: 67 ML/MIN/{1.73_M2}
GFR SERPL CREATININE-BSD FRML MDRD: 70 ML/MIN/{1.73_M2}
GFR SERPL CREATININE-BSD FRML MDRD: 74 ML/MIN/{1.73_M2}
GFR SERPL CREATININE-BSD FRML MDRD: 84 ML/MIN/{1.73_M2}
GFR SERPL CREATININE-BSD FRML MDRD: NORMAL ML/MIN/{1.73_M2}
GLUCOSE BLDC GLUCOMTR-MCNC: 102 MG/DL (ref 70–99)
GLUCOSE BLDC GLUCOMTR-MCNC: 111 MG/DL (ref 70–99)
GLUCOSE BLDC GLUCOMTR-MCNC: 116 MG/DL (ref 70–99)
GLUCOSE BLDC GLUCOMTR-MCNC: 118 MG/DL (ref 70–99)
GLUCOSE BLDC GLUCOMTR-MCNC: 119 MG/DL (ref 70–99)
GLUCOSE BLDC GLUCOMTR-MCNC: 121 MG/DL (ref 70–99)
GLUCOSE BLDC GLUCOMTR-MCNC: 122 MG/DL (ref 70–99)
GLUCOSE BLDC GLUCOMTR-MCNC: 122 MG/DL (ref 70–99)
GLUCOSE BLDC GLUCOMTR-MCNC: 125 MG/DL (ref 70–99)
GLUCOSE BLDC GLUCOMTR-MCNC: 128 MG/DL (ref 70–99)
GLUCOSE BLDC GLUCOMTR-MCNC: 133 MG/DL (ref 70–99)
GLUCOSE BLDC GLUCOMTR-MCNC: 136 MG/DL (ref 70–99)
GLUCOSE BLDC GLUCOMTR-MCNC: 138 MG/DL (ref 70–99)
GLUCOSE BLDC GLUCOMTR-MCNC: 138 MG/DL (ref 70–99)
GLUCOSE BLDC GLUCOMTR-MCNC: 142 MG/DL (ref 70–99)
GLUCOSE BLDC GLUCOMTR-MCNC: 142 MG/DL (ref 70–99)
GLUCOSE BLDC GLUCOMTR-MCNC: 144 MG/DL (ref 70–99)
GLUCOSE BLDC GLUCOMTR-MCNC: 144 MG/DL (ref 70–99)
GLUCOSE BLDC GLUCOMTR-MCNC: 146 MG/DL (ref 70–99)
GLUCOSE BLDC GLUCOMTR-MCNC: 148 MG/DL (ref 70–99)
GLUCOSE BLDC GLUCOMTR-MCNC: 150 MG/DL (ref 70–99)
GLUCOSE BLDC GLUCOMTR-MCNC: 150 MG/DL (ref 70–99)
GLUCOSE BLDC GLUCOMTR-MCNC: 151 MG/DL (ref 70–99)
GLUCOSE BLDC GLUCOMTR-MCNC: 151 MG/DL (ref 70–99)
GLUCOSE BLDC GLUCOMTR-MCNC: 152 MG/DL (ref 70–99)
GLUCOSE BLDC GLUCOMTR-MCNC: 153 MG/DL (ref 70–99)
GLUCOSE BLDC GLUCOMTR-MCNC: 154 MG/DL (ref 70–99)
GLUCOSE BLDC GLUCOMTR-MCNC: 154 MG/DL (ref 70–99)
GLUCOSE BLDC GLUCOMTR-MCNC: 155 MG/DL (ref 70–99)
GLUCOSE BLDC GLUCOMTR-MCNC: 155 MG/DL (ref 70–99)
GLUCOSE BLDC GLUCOMTR-MCNC: 156 MG/DL (ref 70–99)
GLUCOSE BLDC GLUCOMTR-MCNC: 156 MG/DL (ref 70–99)
GLUCOSE BLDC GLUCOMTR-MCNC: 157 MG/DL (ref 70–99)
GLUCOSE BLDC GLUCOMTR-MCNC: 159 MG/DL (ref 70–99)
GLUCOSE BLDC GLUCOMTR-MCNC: 161 MG/DL (ref 70–99)
GLUCOSE BLDC GLUCOMTR-MCNC: 161 MG/DL (ref 70–99)
GLUCOSE BLDC GLUCOMTR-MCNC: 162 MG/DL (ref 70–99)
GLUCOSE BLDC GLUCOMTR-MCNC: 162 MG/DL (ref 70–99)
GLUCOSE BLDC GLUCOMTR-MCNC: 163 MG/DL (ref 70–99)
GLUCOSE BLDC GLUCOMTR-MCNC: 165 MG/DL (ref 70–99)
GLUCOSE BLDC GLUCOMTR-MCNC: 166 MG/DL (ref 70–99)
GLUCOSE BLDC GLUCOMTR-MCNC: 168 MG/DL (ref 70–99)
GLUCOSE BLDC GLUCOMTR-MCNC: 168 MG/DL (ref 70–99)
GLUCOSE BLDC GLUCOMTR-MCNC: 169 MG/DL (ref 70–99)
GLUCOSE BLDC GLUCOMTR-MCNC: 169 MG/DL (ref 70–99)
GLUCOSE BLDC GLUCOMTR-MCNC: 172 MG/DL (ref 70–99)
GLUCOSE BLDC GLUCOMTR-MCNC: 173 MG/DL (ref 70–99)
GLUCOSE BLDC GLUCOMTR-MCNC: 175 MG/DL (ref 70–99)
GLUCOSE BLDC GLUCOMTR-MCNC: 177 MG/DL (ref 70–99)
GLUCOSE BLDC GLUCOMTR-MCNC: 178 MG/DL (ref 70–99)
GLUCOSE BLDC GLUCOMTR-MCNC: 180 MG/DL (ref 70–99)
GLUCOSE BLDC GLUCOMTR-MCNC: 182 MG/DL (ref 70–99)
GLUCOSE BLDC GLUCOMTR-MCNC: 184 MG/DL (ref 70–99)
GLUCOSE BLDC GLUCOMTR-MCNC: 186 MG/DL (ref 70–99)
GLUCOSE BLDC GLUCOMTR-MCNC: 188 MG/DL (ref 70–99)
GLUCOSE BLDC GLUCOMTR-MCNC: 204 MG/DL (ref 70–99)
GLUCOSE BLDC GLUCOMTR-MCNC: 210 MG/DL (ref 70–99)
GLUCOSE BLDC GLUCOMTR-MCNC: 213 MG/DL (ref 70–99)
GLUCOSE BLDC GLUCOMTR-MCNC: 215 MG/DL (ref 70–99)
GLUCOSE BLDC GLUCOMTR-MCNC: 219 MG/DL (ref 70–99)
GLUCOSE BLDC GLUCOMTR-MCNC: 221 MG/DL (ref 70–99)
GLUCOSE BLDC GLUCOMTR-MCNC: 228 MG/DL (ref 70–99)
GLUCOSE BLDC GLUCOMTR-MCNC: 238 MG/DL (ref 70–99)
GLUCOSE BLDC GLUCOMTR-MCNC: 242 MG/DL (ref 70–99)
GLUCOSE BLDC GLUCOMTR-MCNC: 249 MG/DL (ref 70–99)
GLUCOSE BLDC GLUCOMTR-MCNC: 282 MG/DL (ref 70–99)
GLUCOSE BLDC GLUCOMTR-MCNC: 286 MG/DL (ref 70–99)
GLUCOSE BLDC GLUCOMTR-MCNC: 290 MG/DL (ref 70–99)
GLUCOSE BLDC GLUCOMTR-MCNC: 300 MG/DL (ref 70–99)
GLUCOSE BLDC GLUCOMTR-MCNC: 309 MG/DL (ref 70–99)
GLUCOSE BLDC GLUCOMTR-MCNC: 312 MG/DL (ref 70–99)
GLUCOSE BLDC GLUCOMTR-MCNC: 62 MG/DL (ref 70–99)
GLUCOSE BLDC GLUCOMTR-MCNC: 66 MG/DL (ref 70–99)
GLUCOSE BLDC GLUCOMTR-MCNC: 81 MG/DL (ref 70–99)
GLUCOSE BLDC GLUCOMTR-MCNC: 97 MG/DL (ref 70–99)
GLUCOSE BLDC GLUCOMTR-MCNC: 98 MG/DL (ref 70–99)
GLUCOSE BLDC GLUCOMTR-MCNC: 99 MG/DL (ref 70–99)
GLUCOSE SERPL-MCNC: 100 MG/DL (ref 70–99)
GLUCOSE SERPL-MCNC: 100 MG/DL (ref 70–99)
GLUCOSE SERPL-MCNC: 126 MG/DL (ref 70–99)
GLUCOSE SERPL-MCNC: 126 MG/DL (ref 70–99)
GLUCOSE SERPL-MCNC: 128 MG/DL (ref 70–99)
GLUCOSE SERPL-MCNC: 130 MG/DL (ref 70–99)
GLUCOSE SERPL-MCNC: 139 MG/DL (ref 70–99)
GLUCOSE SERPL-MCNC: 147 MG/DL (ref 70–99)
GLUCOSE SERPL-MCNC: 150 MG/DL (ref 70–99)
GLUCOSE SERPL-MCNC: 221 MG/DL (ref 70–99)
GLUCOSE SERPL-MCNC: 224 MG/DL (ref 70–99)
GLUCOSE SERPL-MCNC: 242 MG/DL (ref 70–99)
GLUCOSE SERPL-MCNC: 71 MG/DL (ref 70–99)
GLUCOSE SERPL-MCNC: 78 MG/DL (ref 70–99)
GLUCOSE SERPL-MCNC: NORMAL MG/DL (ref 70–99)
GLUCOSE UR STRIP-MCNC: NEGATIVE MG/DL
GLUCOSE UR STRIP-MCNC: NEGATIVE MG/DL
GRAM STN SPEC: NORMAL
HBA1C MFR BLD: 6.8 % (ref 0–5.6)
HBA1C MFR BLD: 6.9 % (ref 0–5.6)
HCO3 BLD-SCNC: 28 MMOL/L (ref 21–28)
HCO3 BLD-SCNC: 30 MMOL/L (ref 21–28)
HCO3 BLD-SCNC: 31 MMOL/L (ref 21–28)
HCO3 BLD-SCNC: 32 MMOL/L (ref 21–28)
HCO3 BLD-SCNC: 33 MMOL/L (ref 21–28)
HCO3 BLD-SCNC: 34 MMOL/L (ref 21–28)
HCO3 BLD-SCNC: 35 MMOL/L (ref 21–28)
HCO3 BLD-SCNC: 39 MMOL/L (ref 21–28)
HCO3 BLDV-SCNC: 22 MMOL/L (ref 21–28)
HCO3 BLDV-SCNC: 36 MMOL/L (ref 21–28)
HCO3 BLDV-SCNC: 37 MMOL/L (ref 21–28)
HCO3 BLDV-SCNC: 41 MMOL/L (ref 21–28)
HCO3 BLDV-SCNC: 43 MMOL/L (ref 21–28)
HCT VFR BLD AUTO: 52.9 % (ref 35–47)
HCT VFR BLD AUTO: 53.2 % (ref 35–47)
HCT VFR BLD AUTO: 55.2 % (ref 35–47)
HCT VFR BLD AUTO: 56.4 % (ref 35–47)
HCT VFR BLD AUTO: 57.4 % (ref 35–47)
HCT VFR BLD AUTO: 59.1 % (ref 35–47)
HCT VFR BLD AUTO: 60.1 % (ref 35–47)
HCT VFR BLD CALC: 57 %PCV (ref 35–47)
HCT VFR BLD CALC: 57 %PCV (ref 35–47)
HCT VFR BLD CALC: 60 %PCV (ref 35–47)
HCT VFR BLD CALC: 61 %PCV (ref 35–47)
HGB BLD CALC-MCNC: 19.4 G/DL (ref 11.7–15.7)
HGB BLD CALC-MCNC: 19.4 G/DL (ref 11.7–15.7)
HGB BLD CALC-MCNC: 20.4 G/DL (ref 11.7–15.7)
HGB BLD CALC-MCNC: 20.7 G/DL (ref 11.7–15.7)
HGB BLD-MCNC: 15.6 G/DL (ref 11.7–15.7)
HGB BLD-MCNC: 16.5 G/DL (ref 11.7–15.7)
HGB BLD-MCNC: 16.9 G/DL (ref 11.7–15.7)
HGB BLD-MCNC: 17 G/DL (ref 11.7–15.7)
HGB BLD-MCNC: 17.2 G/DL (ref 11.7–15.7)
HGB BLD-MCNC: 17.8 G/DL (ref 11.7–15.7)
HGB BLD-MCNC: 18.1 G/DL (ref 11.7–15.7)
HGB UR QL STRIP: ABNORMAL
HGB UR QL STRIP: ABNORMAL
IMM GRANULOCYTES # BLD: 0 10E9/L (ref 0–0.4)
IMM GRANULOCYTES # BLD: 0.2 10E9/L (ref 0–0.4)
IMM GRANULOCYTES NFR BLD: 0.2 %
IMM GRANULOCYTES NFR BLD: 1.8 %
INR PPP: 1.4 (ref 0.86–1.14)
INTERPRETATION ECG - MUSE: NORMAL
INTERPRETATION ECG - MUSE: NORMAL
KETONES UR STRIP-MCNC: NEGATIVE MG/DL
KETONES UR STRIP-MCNC: NEGATIVE MG/DL
LACTATE BLD-SCNC: 1.3 MMOL/L (ref 0.7–2)
LACTATE BLD-SCNC: 10.6 MMOL/L (ref 0.7–2.1)
LACTATE BLD-SCNC: 2.5 MMOL/L (ref 0.7–2)
LACTATE BLD-SCNC: 2.8 MMOL/L (ref 0.7–2)
LACTATE BLD-SCNC: 3.2 MMOL/L (ref 0.7–2)
LACTATE BLD-SCNC: 3.9 MMOL/L (ref 0.7–2.1)
LACTATE BLD-SCNC: 3.9 MMOL/L (ref 0.7–2.1)
LACTATE BLD-SCNC: 5.4 MMOL/L (ref 0.7–2.1)
LEUKOCYTE ESTERASE UR QL STRIP: ABNORMAL
LEUKOCYTE ESTERASE UR QL STRIP: ABNORMAL
LIPASE SERPL-CCNC: 86 U/L (ref 73–393)
LMWH PPP CHRO-ACNC: 0.74 IU/ML
LMWH PPP CHRO-ACNC: NORMAL IU/ML
LMWH PPP CHRO-ACNC: NORMAL IU/ML
LYMPHOCYTES # BLD AUTO: 0.3 10E9/L (ref 0.8–5.3)
LYMPHOCYTES # BLD AUTO: 0.5 10E9/L (ref 0.8–5.3)
LYMPHOCYTES # BLD AUTO: 1.1 10E9/L (ref 0.8–5.3)
LYMPHOCYTES # BLD AUTO: 1.9 10E9/L (ref 0.8–5.3)
LYMPHOCYTES NFR BLD AUTO: 12.9 %
LYMPHOCYTES NFR BLD AUTO: 25 %
LYMPHOCYTES NFR BLD AUTO: 4 %
LYMPHOCYTES NFR BLD AUTO: 4.4 %
Lab: ABNORMAL
Lab: ABNORMAL
Lab: NORMAL
MAGNESIUM SERPL-MCNC: 1.2 MG/DL (ref 1.6–2.3)
MAGNESIUM SERPL-MCNC: 1.6 MG/DL (ref 1.6–2.3)
MAGNESIUM SERPL-MCNC: 1.9 MG/DL (ref 1.6–2.3)
MAGNESIUM SERPL-MCNC: 2.1 MG/DL (ref 1.6–2.3)
MAGNESIUM SERPL-MCNC: 2.3 MG/DL (ref 1.6–2.3)
MAGNESIUM SERPL-MCNC: NORMAL MG/DL (ref 1.6–2.3)
MCH RBC QN AUTO: 31.8 PG (ref 26.5–33)
MCH RBC QN AUTO: 32 PG (ref 26.5–33)
MCH RBC QN AUTO: 32.2 PG (ref 26.5–33)
MCH RBC QN AUTO: 32.3 PG (ref 26.5–33)
MCH RBC QN AUTO: 32.4 PG (ref 26.5–33)
MCH RBC QN AUTO: 32.5 PG (ref 26.5–33)
MCH RBC QN AUTO: 33.1 PG (ref 26.5–33)
MCHC RBC AUTO-ENTMCNC: 29.3 G/DL (ref 31.5–36.5)
MCHC RBC AUTO-ENTMCNC: 29.4 G/DL (ref 31.5–36.5)
MCHC RBC AUTO-ENTMCNC: 29.6 G/DL (ref 31.5–36.5)
MCHC RBC AUTO-ENTMCNC: 30.1 G/DL (ref 31.5–36.5)
MCHC RBC AUTO-ENTMCNC: 30.6 G/DL (ref 31.5–36.5)
MCHC RBC AUTO-ENTMCNC: 31.2 G/DL (ref 31.5–36.5)
MCHC RBC AUTO-ENTMCNC: 31.2 G/DL (ref 31.5–36.5)
MCV RBC AUTO: 103 FL (ref 78–100)
MCV RBC AUTO: 104 FL (ref 78–100)
MCV RBC AUTO: 106 FL (ref 78–100)
MCV RBC AUTO: 106 FL (ref 78–100)
MCV RBC AUTO: 109 FL (ref 78–100)
MCV RBC AUTO: 110 FL (ref 78–100)
MCV RBC AUTO: 112 FL (ref 78–100)
MONOCYTES # BLD AUTO: 0.5 10E9/L (ref 0–1.3)
MONOCYTES # BLD AUTO: 0.6 10E9/L (ref 0–1.3)
MONOCYTES # BLD AUTO: 1 10E9/L (ref 0–1.3)
MONOCYTES # BLD AUTO: 1.8 10E9/L (ref 0–1.3)
MONOCYTES NFR BLD AUTO: 12.2 %
MONOCYTES NFR BLD AUTO: 24 %
MONOCYTES NFR BLD AUTO: 5.3 %
MONOCYTES NFR BLD AUTO: 6 %
MRSA DNA SPEC QL NAA+PROBE: NEGATIVE
MUCOUS THREADS #/AREA URNS LPF: PRESENT /LPF
NEUTROPHILS # BLD AUTO: 10.3 10E9/L (ref 1.6–8.3)
NEUTROPHILS # BLD AUTO: 3.8 10E9/L (ref 1.6–8.3)
NEUTROPHILS # BLD AUTO: 5.9 10E9/L (ref 1.6–8.3)
NEUTROPHILS # BLD AUTO: 7.2 10E9/L (ref 1.6–8.3)
NEUTROPHILS NFR BLD AUTO: 51 %
NEUTROPHILS NFR BLD AUTO: 72.8 %
NEUTROPHILS NFR BLD AUTO: 90 %
NEUTROPHILS NFR BLD AUTO: 90 %
NITRATE UR QL: NEGATIVE
NITRATE UR QL: NEGATIVE
NRBC # BLD AUTO: 0.1 10*3/UL
NRBC # BLD AUTO: 0.2 10*3/UL
NRBC BLD AUTO-RTO: 1 /100
NRBC BLD AUTO-RTO: 2 /100
NT-PROBNP SERPL-MCNC: ABNORMAL PG/ML (ref 0–900)
O2/TOTAL GAS SETTING VFR VENT: 75 %
O2/TOTAL GAS SETTING VFR VENT: ABNORMAL %
O2/TOTAL GAS SETTING VFR VENT: ABNORMAL %
OXYHGB MFR BLD: 89 % (ref 92–100)
OXYHGB MFR BLD: 89 % (ref 92–100)
OXYHGB MFR BLD: 93 % (ref 92–100)
OXYHGB MFR BLD: 94 % (ref 92–100)
OXYHGB MFR BLD: 95 % (ref 92–100)
OXYHGB MFR BLD: 96 % (ref 92–100)
OXYHGB MFR BLD: 96 % (ref 92–100)
OXYHGB MFR BLDV: 62 %
OXYHGB MFR BLDV: 67 %
OXYHGB MFR BLDV: 70 %
OXYHGB MFR BLDV: 71 %
OXYHGB MFR BLDV: 80 %
PCO2 BLD: 100 MM HG (ref 35–45)
PCO2 BLD: 49 MM HG (ref 35–45)
PCO2 BLD: 50 MM HG (ref 35–45)
PCO2 BLD: 53 MM HG (ref 35–45)
PCO2 BLD: 56 MM HG (ref 35–45)
PCO2 BLD: 57 MM HG (ref 35–45)
PCO2 BLD: 57 MM HG (ref 35–45)
PCO2 BLD: 58 MM HG (ref 35–45)
PCO2 BLD: 60 MM HG (ref 35–45)
PCO2 BLD: 60 MM HG (ref 35–45)
PCO2 BLD: 65 MM HG (ref 35–45)
PCO2 BLD: 85 MM HG (ref 35–45)
PCO2 BLD: 89 MM HG (ref 35–45)
PCO2 BLDV: 106 MM HG (ref 40–50)
PCO2 BLDV: 111 MM HG (ref 40–50)
PCO2 BLDV: 119 MM HG (ref 40–50)
PCO2 BLDV: 39 MM HG (ref 40–50)
PCO2 BLDV: 48 MM HG (ref 40–50)
PCO2 BLDV: 53 MM HG (ref 40–50)
PCO2 BLDV: 57 MM HG (ref 40–50)
PCO2 BLDV: 64 MM HG (ref 40–50)
PCO2 BLDV: 86 MM HG (ref 40–50)
PH BLD: 7.13 PH (ref 7.35–7.45)
PH BLD: 7.13 PH (ref 7.35–7.45)
PH BLD: 7.14 PH (ref 7.35–7.45)
PH BLD: 7.28 PH (ref 7.35–7.45)
PH BLD: 7.33 PH (ref 7.35–7.45)
PH BLD: 7.34 PH (ref 7.35–7.45)
PH BLD: 7.35 PH (ref 7.35–7.45)
PH BLD: 7.37 PH (ref 7.35–7.45)
PH BLD: 7.38 PH (ref 7.35–7.45)
PH BLD: 7.4 PH (ref 7.35–7.45)
PH BLD: 7.41 PH (ref 7.35–7.45)
PH BLD: 7.45 PH (ref 7.35–7.45)
PH BLD: 7.46 PH (ref 7.35–7.45)
PH BLDV: 7.02 PH (ref 7.32–7.43)
PH BLDV: 7.04 PH (ref 7.32–7.43)
PH BLDV: 7.07 PH (ref 7.32–7.43)
PH BLDV: 7.08 PH (ref 7.32–7.43)
PH BLDV: 7.36 PH (ref 7.32–7.43)
PH BLDV: 7.44 PH (ref 7.32–7.43)
PH BLDV: 7.45 PH (ref 7.32–7.43)
PH BLDV: 7.46 PH (ref 7.32–7.43)
PH BLDV: 7.48 PH (ref 7.32–7.43)
PH UR STRIP: 5 PH (ref 5–7)
PH UR STRIP: 6 PH (ref 5–7)
PHOSPHATE SERPL-MCNC: 2.5 MG/DL (ref 2.5–4.5)
PHOSPHATE SERPL-MCNC: 9.4 MG/DL (ref 2.5–4.5)
PLATELET # BLD AUTO: 134 10E9/L (ref 150–450)
PLATELET # BLD AUTO: 147 10E9/L (ref 150–450)
PLATELET # BLD AUTO: 149 10E9/L (ref 150–450)
PLATELET # BLD AUTO: 161 10E9/L (ref 150–450)
PLATELET # BLD AUTO: 162 10E9/L (ref 150–450)
PLATELET # BLD AUTO: 82 10E9/L (ref 150–450)
PLATELET # BLD AUTO: 90 10E9/L (ref 150–450)
PLATELET # BLD AUTO: 98 10E9/L (ref 150–450)
PLATELET # BLD EST: ABNORMAL 10*3/UL
PLATELET # BLD EST: ABNORMAL 10*3/UL
PO2 BLD: 100 MM HG (ref 80–105)
PO2 BLD: 123 MM HG (ref 80–105)
PO2 BLD: 129 MM HG (ref 80–105)
PO2 BLD: 130 MM HG (ref 80–105)
PO2 BLD: 59 MM HG (ref 80–105)
PO2 BLD: 65 MM HG (ref 80–105)
PO2 BLD: 73 MM HG (ref 80–105)
PO2 BLD: 80 MM HG (ref 80–105)
PO2 BLD: 84 MM HG (ref 80–105)
PO2 BLD: 87 MM HG (ref 80–105)
PO2 BLD: 93 MM HG (ref 80–105)
PO2 BLD: 99 MM HG (ref 80–105)
PO2 BLD: 99 MM HG (ref 80–105)
PO2 BLDV: 18 MM HG (ref 25–47)
PO2 BLDV: 19 MM HG (ref 25–47)
PO2 BLDV: 26 MM HG (ref 25–47)
PO2 BLDV: 34 MM HG (ref 25–47)
PO2 BLDV: 37 MM HG (ref 25–47)
PO2 BLDV: 38 MM HG (ref 25–47)
PO2 BLDV: 38 MM HG (ref 25–47)
PO2 BLDV: 40 MM HG (ref 25–47)
PO2 BLDV: 44 MM HG (ref 25–47)
POLYCHROMASIA BLD QL SMEAR: SLIGHT
POLYCHROMASIA BLD QL SMEAR: SLIGHT
POTASSIUM BLD-SCNC: 5.3 MMOL/L (ref 3.4–5.3)
POTASSIUM BLD-SCNC: 5.9 MMOL/L (ref 3.4–5.3)
POTASSIUM BLD-SCNC: 5.9 MMOL/L (ref 3.4–5.3)
POTASSIUM BLD-SCNC: 7 MMOL/L (ref 3.4–5.3)
POTASSIUM SERPL-SCNC: 2.5 MMOL/L (ref 3.4–5.3)
POTASSIUM SERPL-SCNC: 3.1 MMOL/L (ref 3.4–5.3)
POTASSIUM SERPL-SCNC: 3.2 MMOL/L (ref 3.4–5.3)
POTASSIUM SERPL-SCNC: 3.8 MMOL/L (ref 3.4–5.3)
POTASSIUM SERPL-SCNC: 3.8 MMOL/L (ref 3.4–5.3)
POTASSIUM SERPL-SCNC: 3.9 MMOL/L (ref 3.4–5.3)
POTASSIUM SERPL-SCNC: 4 MMOL/L (ref 3.4–5.3)
POTASSIUM SERPL-SCNC: 4.3 MMOL/L (ref 3.4–5.3)
POTASSIUM SERPL-SCNC: 4.6 MMOL/L (ref 3.4–5.3)
POTASSIUM SERPL-SCNC: 4.9 MMOL/L (ref 3.4–5.3)
POTASSIUM SERPL-SCNC: 5.2 MMOL/L (ref 3.4–5.3)
POTASSIUM SERPL-SCNC: 5.4 MMOL/L (ref 3.4–5.3)
POTASSIUM SERPL-SCNC: 5.5 MMOL/L (ref 3.4–5.3)
POTASSIUM SERPL-SCNC: 5.5 MMOL/L (ref 3.4–5.3)
POTASSIUM SERPL-SCNC: 6 MMOL/L (ref 3.4–5.3)
POTASSIUM SERPL-SCNC: 6.2 MMOL/L (ref 3.4–5.3)
POTASSIUM SERPL-SCNC: 7.1 MMOL/L (ref 3.4–5.3)
POTASSIUM SERPL-SCNC: NORMAL MMOL/L (ref 3.4–5.3)
PROT SERPL-MCNC: 5.2 G/DL (ref 6.8–8.8)
PROT SERPL-MCNC: 5.2 G/DL (ref 6.8–8.8)
PROT SERPL-MCNC: 5.4 G/DL (ref 6.8–8.8)
PROT SERPL-MCNC: 5.5 G/DL (ref 6.8–8.8)
PROT SERPL-MCNC: 6.2 G/DL (ref 6.8–8.8)
PROT SERPL-MCNC: 7.1 G/DL (ref 6.8–8.8)
RBC # BLD AUTO: 4.87 10E12/L (ref 3.8–5.2)
RBC # BLD AUTO: 5.13 10E12/L (ref 3.8–5.2)
RBC # BLD AUTO: 5.2 10E12/L (ref 3.8–5.2)
RBC # BLD AUTO: 5.33 10E12/L (ref 3.8–5.2)
RBC # BLD AUTO: 5.34 10E12/L (ref 3.8–5.2)
RBC # BLD AUTO: 5.38 10E12/L (ref 3.8–5.2)
RBC # BLD AUTO: 5.59 10E12/L (ref 3.8–5.2)
RBC #/AREA URNS AUTO: 1 /HPF (ref 0–2)
RBC #/AREA URNS AUTO: 7 /HPF (ref 0–2)
SAO2 % BLDV FROM PO2: 12 %
SAO2 % BLDV FROM PO2: 15 %
SAO2 % BLDV FROM PO2: 25 %
SAO2 % BLDV FROM PO2: 48 %
SODIUM BLD-SCNC: 139 MMOL/L (ref 133–144)
SODIUM BLD-SCNC: 140 MMOL/L (ref 133–144)
SODIUM SERPL-SCNC: 136 MMOL/L (ref 133–144)
SODIUM SERPL-SCNC: 137 MMOL/L (ref 133–144)
SODIUM SERPL-SCNC: 138 MMOL/L (ref 133–144)
SODIUM SERPL-SCNC: 138 MMOL/L (ref 133–144)
SODIUM SERPL-SCNC: 139 MMOL/L (ref 133–144)
SODIUM SERPL-SCNC: 141 MMOL/L (ref 133–144)
SODIUM SERPL-SCNC: 142 MMOL/L (ref 133–144)
SODIUM SERPL-SCNC: 143 MMOL/L (ref 133–144)
SODIUM SERPL-SCNC: 144 MMOL/L (ref 133–144)
SODIUM SERPL-SCNC: 146 MMOL/L (ref 133–144)
SODIUM SERPL-SCNC: 149 MMOL/L (ref 133–144)
SODIUM SERPL-SCNC: 150 MMOL/L (ref 133–144)
SODIUM SERPL-SCNC: NORMAL MMOL/L (ref 133–144)
SOURCE: ABNORMAL
SOURCE: ABNORMAL
SP GR UR STRIP: 1.02 (ref 1–1.03)
SP GR UR STRIP: 1.02 (ref 1–1.03)
SPECIMEN EXP DATE BLD: NORMAL
SPECIMEN SOURCE: ABNORMAL
SPECIMEN SOURCE: NORMAL
SQUAMOUS #/AREA URNS AUTO: 6 /HPF (ref 0–1)
TRIGL SERPL-MCNC: 625 MG/DL
TRIGL SERPL-MCNC: NORMAL MG/DL
TROPONIN I BLD-MCNC: 0.24 UG/L (ref 0–0.08)
TROPONIN I SERPL-MCNC: 0.31 UG/L (ref 0–0.04)
TROPONIN I SERPL-MCNC: 0.8 UG/L (ref 0–0.04)
TROPONIN I SERPL-MCNC: 1.08 UG/L (ref 0–0.04)
TROPONIN I SERPL-MCNC: 1.29 UG/L (ref 0–0.04)
TROPONIN I SERPL-MCNC: 5.39 UG/L (ref 0–0.04)
UROBILINOGEN UR STRIP-MCNC: 4 MG/DL (ref 0–2)
UROBILINOGEN UR STRIP-MCNC: NORMAL MG/DL (ref 0–2)
WBC # BLD AUTO: 10 10E9/L (ref 4–11)
WBC # BLD AUTO: 10.6 10E9/L (ref 4–11)
WBC # BLD AUTO: 11.4 10E9/L (ref 4–11)
WBC # BLD AUTO: 7.5 10E9/L (ref 4–11)
WBC # BLD AUTO: 8 10E9/L (ref 4–11)
WBC # BLD AUTO: 8.1 10E9/L (ref 4–11)
WBC # BLD AUTO: 8.1 10E9/L (ref 4–11)
WBC #/AREA URNS AUTO: 19 /HPF (ref 0–5)
WBC #/AREA URNS AUTO: 45 /HPF (ref 0–5)
WBC CLUMPS #/AREA URNS HPF: PRESENT /HPF

## 2019-01-01 PROCEDURE — 31500 INSERT EMERGENCY AIRWAY: CPT

## 2019-01-01 PROCEDURE — 25000128 H RX IP 250 OP 636

## 2019-01-01 PROCEDURE — 27210437 ZZH NUTRITION PRODUCT SEMIELEM INTERMED LITER

## 2019-01-01 PROCEDURE — 94645 CONT INHLJ TX EACH ADDL HOUR: CPT

## 2019-01-01 PROCEDURE — 87102 FUNGUS ISOLATION CULTURE: CPT

## 2019-01-01 PROCEDURE — 82805 BLOOD GASES W/O2 SATURATION: CPT | Performed by: INTERNAL MEDICINE

## 2019-01-01 PROCEDURE — 25800030 ZZH RX IP 258 OP 636: Performed by: INTERNAL MEDICINE

## 2019-01-01 PROCEDURE — 40000275 ZZH STATISTIC RCP TIME EA 10 MIN: Mod: 76

## 2019-01-01 PROCEDURE — 84484 ASSAY OF TROPONIN QUANT: CPT

## 2019-01-01 PROCEDURE — 25000128 H RX IP 250 OP 636: Performed by: PHYSICIAN ASSISTANT

## 2019-01-01 PROCEDURE — 85027 COMPLETE CBC AUTOMATED: CPT | Performed by: INTERNAL MEDICINE

## 2019-01-01 PROCEDURE — 85027 COMPLETE CBC AUTOMATED: CPT

## 2019-01-01 PROCEDURE — 99291 CRITICAL CARE FIRST HOUR: CPT

## 2019-01-01 PROCEDURE — 25000128 H RX IP 250 OP 636: Performed by: INTERNAL MEDICINE

## 2019-01-01 PROCEDURE — 25000132 ZZH RX MED GY IP 250 OP 250 PS 637: Performed by: INTERNAL MEDICINE

## 2019-01-01 PROCEDURE — 25000125 ZZHC RX 250

## 2019-01-01 PROCEDURE — 40000239 ZZH STATISTIC VAT ROUNDS

## 2019-01-01 PROCEDURE — 93325 DOPPLER ECHO COLOR FLOW MAPG: CPT | Mod: 26 | Performed by: INTERNAL MEDICINE

## 2019-01-01 PROCEDURE — 25000125 ZZHC RX 250: Performed by: INTERNAL MEDICINE

## 2019-01-01 PROCEDURE — 94640 AIRWAY INHALATION TREATMENT: CPT

## 2019-01-01 PROCEDURE — 25000128 H RX IP 250 OP 636: Performed by: HOSPITALIST

## 2019-01-01 PROCEDURE — 25800025 ZZH RX 258

## 2019-01-01 PROCEDURE — 36600 WITHDRAWAL OF ARTERIAL BLOOD: CPT

## 2019-01-01 PROCEDURE — 25000125 ZZHC RX 250: Performed by: NURSE PRACTITIONER

## 2019-01-01 PROCEDURE — 87641 MR-STAPH DNA AMP PROBE: CPT

## 2019-01-01 PROCEDURE — 93306 TTE W/DOPPLER COMPLETE: CPT | Mod: 26 | Performed by: INTERNAL MEDICINE

## 2019-01-01 PROCEDURE — 82330 ASSAY OF CALCIUM: CPT | Performed by: INTERNAL MEDICINE

## 2019-01-01 PROCEDURE — 36569 INSJ PICC 5 YR+ W/O IMAGING: CPT

## 2019-01-01 PROCEDURE — 25000128 H RX IP 250 OP 636: Performed by: NURSE ANESTHETIST, CERTIFIED REGISTERED

## 2019-01-01 PROCEDURE — 40000986 XR CHEST PORT 1 VW

## 2019-01-01 PROCEDURE — 94640 AIRWAY INHALATION TREATMENT: CPT | Mod: 76

## 2019-01-01 PROCEDURE — 83735 ASSAY OF MAGNESIUM: CPT | Performed by: PHYSICIAN ASSISTANT

## 2019-01-01 PROCEDURE — 40000275 ZZH STATISTIC RCP TIME EA 10 MIN

## 2019-01-01 PROCEDURE — 99232 SBSQ HOSP IP/OBS MODERATE 35: CPT | Performed by: INTERNAL MEDICINE

## 2019-01-01 PROCEDURE — 99292 CRITICAL CARE ADDL 30 MIN: CPT | Performed by: INTERNAL MEDICINE

## 2019-01-01 PROCEDURE — 83605 ASSAY OF LACTIC ACID: CPT

## 2019-01-01 PROCEDURE — 80048 BASIC METABOLIC PNL TOTAL CA: CPT | Performed by: HOSPITALIST

## 2019-01-01 PROCEDURE — 40000497 ZZHCL STATISTIC SODIUM ED POCT

## 2019-01-01 PROCEDURE — 00000146 ZZHCL STATISTIC GLUCOSE BY METER IP

## 2019-01-01 PROCEDURE — 87205 SMEAR GRAM STAIN: CPT

## 2019-01-01 PROCEDURE — 87040 BLOOD CULTURE FOR BACTERIA: CPT

## 2019-01-01 PROCEDURE — 93010 ELECTROCARDIOGRAM REPORT: CPT | Performed by: INTERNAL MEDICINE

## 2019-01-01 PROCEDURE — 25000132 ZZH RX MED GY IP 250 OP 250 PS 637: Performed by: HOSPITALIST

## 2019-01-01 PROCEDURE — 99291 CRITICAL CARE FIRST HOUR: CPT | Performed by: INTERNAL MEDICINE

## 2019-01-01 PROCEDURE — 36415 COLL VENOUS BLD VENIPUNCTURE: CPT | Performed by: HOSPITALIST

## 2019-01-01 PROCEDURE — 99292 CRITICAL CARE ADDL 30 MIN: CPT

## 2019-01-01 PROCEDURE — 87040 BLOOD CULTURE FOR BACTERIA: CPT | Performed by: EMERGENCY MEDICINE

## 2019-01-01 PROCEDURE — 87070 CULTURE OTHR SPECIMN AEROBIC: CPT

## 2019-01-01 PROCEDURE — 99291 CRITICAL CARE FIRST HOUR: CPT | Performed by: PHYSICIAN ASSISTANT

## 2019-01-01 PROCEDURE — 25000132 ZZH RX MED GY IP 250 OP 250 PS 637: Performed by: PHYSICIAN ASSISTANT

## 2019-01-01 PROCEDURE — 94003 VENT MGMT INPAT SUBQ DAY: CPT

## 2019-01-01 PROCEDURE — 80048 BASIC METABOLIC PNL TOTAL CA: CPT | Performed by: INTERNAL MEDICINE

## 2019-01-01 PROCEDURE — 25000125 ZZHC RX 250: Performed by: HOSPITALIST

## 2019-01-01 PROCEDURE — 86900 BLOOD TYPING SEROLOGIC ABO: CPT | Performed by: EMERGENCY MEDICINE

## 2019-01-01 PROCEDURE — 99291 CRITICAL CARE FIRST HOUR: CPT | Mod: 25

## 2019-01-01 PROCEDURE — 96367 TX/PROPH/DG ADDL SEQ IV INF: CPT

## 2019-01-01 PROCEDURE — 93308 TTE F-UP OR LMTD: CPT

## 2019-01-01 PROCEDURE — 0B9F8ZX DRAINAGE OF RIGHT LOWER LUNG LOBE, VIA NATURAL OR ARTIFICIAL OPENING ENDOSCOPIC, DIAGNOSTIC: ICD-10-PCS

## 2019-01-01 PROCEDURE — 85379 FIBRIN DEGRADATION QUANT: CPT | Performed by: EMERGENCY MEDICINE

## 2019-01-01 PROCEDURE — 25000131 ZZH RX MED GY IP 250 OP 636 PS 637: Performed by: INTERNAL MEDICINE

## 2019-01-01 PROCEDURE — 84478 ASSAY OF TRIGLYCERIDES: CPT | Performed by: HOSPITALIST

## 2019-01-01 PROCEDURE — 25800030 ZZH RX IP 258 OP 636

## 2019-01-01 PROCEDURE — 71045 X-RAY EXAM CHEST 1 VIEW: CPT | Mod: 76

## 2019-01-01 PROCEDURE — 86901 BLOOD TYPING SEROLOGIC RH(D): CPT | Performed by: EMERGENCY MEDICINE

## 2019-01-01 PROCEDURE — 87798 DETECT AGENT NOS DNA AMP: CPT

## 2019-01-01 PROCEDURE — 85610 PROTHROMBIN TIME: CPT | Performed by: EMERGENCY MEDICINE

## 2019-01-01 PROCEDURE — 25000128 H RX IP 250 OP 636: Performed by: EMERGENCY MEDICINE

## 2019-01-01 PROCEDURE — 40000671 ZZH STATISTIC ANESTHESIA CASE

## 2019-01-01 PROCEDURE — 93005 ELECTROCARDIOGRAM TRACING: CPT

## 2019-01-01 PROCEDURE — 82805 BLOOD GASES W/O2 SATURATION: CPT | Performed by: HOSPITALIST

## 2019-01-01 PROCEDURE — 71045 X-RAY EXAM CHEST 1 VIEW: CPT

## 2019-01-01 PROCEDURE — 83880 ASSAY OF NATRIURETIC PEPTIDE: CPT | Performed by: EMERGENCY MEDICINE

## 2019-01-01 PROCEDURE — 36415 COLL VENOUS BLD VENIPUNCTURE: CPT

## 2019-01-01 PROCEDURE — 83605 ASSAY OF LACTIC ACID: CPT | Performed by: INTERNAL MEDICINE

## 2019-01-01 PROCEDURE — 40000008 ZZH STATISTIC AIRWAY CARE

## 2019-01-01 PROCEDURE — 99291 CRITICAL CARE FIRST HOUR: CPT | Mod: 25 | Performed by: INTERNAL MEDICINE

## 2019-01-01 PROCEDURE — 85025 COMPLETE CBC W/AUTO DIFF WBC: CPT | Performed by: PHYSICIAN ASSISTANT

## 2019-01-01 PROCEDURE — 83735 ASSAY OF MAGNESIUM: CPT | Performed by: INTERNAL MEDICINE

## 2019-01-01 PROCEDURE — 3E043XZ INTRODUCTION OF VASOPRESSOR INTO CENTRAL VEIN, PERCUTANEOUS APPROACH: ICD-10-PCS | Performed by: INTERNAL MEDICINE

## 2019-01-01 PROCEDURE — 80053 COMPREHEN METABOLIC PANEL: CPT | Performed by: PHYSICIAN ASSISTANT

## 2019-01-01 PROCEDURE — 93010 ELECTROCARDIOGRAM REPORT: CPT | Mod: 76 | Performed by: INTERNAL MEDICINE

## 2019-01-01 PROCEDURE — 93306 TTE W/DOPPLER COMPLETE: CPT

## 2019-01-01 PROCEDURE — 20000003 ZZH R&B ICU

## 2019-01-01 PROCEDURE — 83036 HEMOGLOBIN GLYCOSYLATED A1C: CPT | Performed by: INTERNAL MEDICINE

## 2019-01-01 PROCEDURE — 81001 URINALYSIS AUTO W/SCOPE: CPT | Performed by: EMERGENCY MEDICINE

## 2019-01-01 PROCEDURE — 25000131 ZZH RX MED GY IP 250 OP 636 PS 637: Performed by: HOSPITALIST

## 2019-01-01 PROCEDURE — 87070 CULTURE OTHR SPECIMN AEROBIC: CPT | Performed by: PHYSICIAN ASSISTANT

## 2019-01-01 PROCEDURE — 84100 ASSAY OF PHOSPHORUS: CPT | Performed by: EMERGENCY MEDICINE

## 2019-01-01 PROCEDURE — 85049 AUTOMATED PLATELET COUNT: CPT | Performed by: HOSPITALIST

## 2019-01-01 PROCEDURE — 25800030 ZZH RX IP 258 OP 636: Performed by: STUDENT IN AN ORGANIZED HEALTH CARE EDUCATION/TRAINING PROGRAM

## 2019-01-01 PROCEDURE — 93321 DOPPLER ECHO F-UP/LMTD STD: CPT | Mod: 26 | Performed by: INTERNAL MEDICINE

## 2019-01-01 PROCEDURE — 93308 TTE F-UP OR LMTD: CPT | Mod: 26 | Performed by: INTERNAL MEDICINE

## 2019-01-01 PROCEDURE — 87086 URINE CULTURE/COLONY COUNT: CPT | Performed by: HOSPITALIST

## 2019-01-01 PROCEDURE — 84132 ASSAY OF SERUM POTASSIUM: CPT | Performed by: INTERNAL MEDICINE

## 2019-01-01 PROCEDURE — 85520 HEPARIN ASSAY: CPT

## 2019-01-01 PROCEDURE — 83735 ASSAY OF MAGNESIUM: CPT | Performed by: EMERGENCY MEDICINE

## 2019-01-01 PROCEDURE — 27210339 ZZH CIRCUIT HUMIDITY W/CPAP BIP

## 2019-01-01 PROCEDURE — 85025 COMPLETE CBC W/AUTO DIFF WBC: CPT | Performed by: INTERNAL MEDICINE

## 2019-01-01 PROCEDURE — 93970 EXTREMITY STUDY: CPT | Mod: XS

## 2019-01-01 PROCEDURE — 82803 BLOOD GASES ANY COMBINATION: CPT

## 2019-01-01 PROCEDURE — 82565 ASSAY OF CREATININE: CPT

## 2019-01-01 PROCEDURE — 31645 BRNCHSC W/THER ASPIR 1ST: CPT | Mod: 50

## 2019-01-01 PROCEDURE — 25800030 ZZH RX IP 258 OP 636: Performed by: EMERGENCY MEDICINE

## 2019-01-01 PROCEDURE — 99233 SBSQ HOSP IP/OBS HIGH 50: CPT | Performed by: INTERNAL MEDICINE

## 2019-01-01 PROCEDURE — 36620 INSERTION CATHETER ARTERY: CPT | Mod: GC | Performed by: INTERNAL MEDICINE

## 2019-01-01 PROCEDURE — P9041 ALBUMIN (HUMAN),5%, 50ML: HCPCS

## 2019-01-01 PROCEDURE — 5A2204Z RESTORATION OF CARDIAC RHYTHM, SINGLE: ICD-10-PCS | Performed by: INTERNAL MEDICINE

## 2019-01-01 PROCEDURE — 83036 HEMOGLOBIN GLYCOSYLATED A1C: CPT | Performed by: HOSPITALIST

## 2019-01-01 PROCEDURE — 96365 THER/PROPH/DIAG IV INF INIT: CPT

## 2019-01-01 PROCEDURE — 87581 M.PNEUMON DNA AMP PROBE: CPT

## 2019-01-01 PROCEDURE — 87186 SC STD MICRODIL/AGAR DIL: CPT

## 2019-01-01 PROCEDURE — 87541 LEGION PNEUMO DNA AMP PROB: CPT

## 2019-01-01 PROCEDURE — 27211040 ZZH CONTINUOUS NEBULIZER MICRO PUMP

## 2019-01-01 PROCEDURE — 87086 URINE CULTURE/COLONY COUNT: CPT | Performed by: EMERGENCY MEDICINE

## 2019-01-01 PROCEDURE — 99292 CRITICAL CARE ADDL 30 MIN: CPT | Mod: 25 | Performed by: INTERNAL MEDICINE

## 2019-01-01 PROCEDURE — 87800 DETECT AGNT MULT DNA DIREC: CPT

## 2019-01-01 PROCEDURE — 71250 CT THORAX DX C-: CPT

## 2019-01-01 PROCEDURE — 25000132 ZZH RX MED GY IP 250 OP 250 PS 637

## 2019-01-01 PROCEDURE — 40000498 ZZHCL STATISTIC POTASSIUM ED POCT

## 2019-01-01 PROCEDURE — 84484 ASSAY OF TROPONIN QUANT: CPT | Performed by: HOSPITALIST

## 2019-01-01 PROCEDURE — 87205 SMEAR GRAM STAIN: CPT | Performed by: PHYSICIAN ASSISTANT

## 2019-01-01 PROCEDURE — 84484 ASSAY OF TROPONIN QUANT: CPT | Performed by: PHYSICIAN ASSISTANT

## 2019-01-01 PROCEDURE — 85025 COMPLETE CBC W/AUTO DIFF WBC: CPT

## 2019-01-01 PROCEDURE — 27210222 ZZH KIT SHRLOCK 6FR POWER PICC

## 2019-01-01 PROCEDURE — 80053 COMPREHEN METABOLIC PANEL: CPT | Performed by: INTERNAL MEDICINE

## 2019-01-01 PROCEDURE — 94644 CONT INHLJ TX 1ST HOUR: CPT

## 2019-01-01 PROCEDURE — 25800030 ZZH RX IP 258 OP 636: Performed by: NURSE PRACTITIONER

## 2019-01-01 PROCEDURE — 80053 COMPREHEN METABOLIC PANEL: CPT | Performed by: EMERGENCY MEDICINE

## 2019-01-01 PROCEDURE — 96366 THER/PROPH/DIAG IV INF ADDON: CPT

## 2019-01-01 PROCEDURE — 87210 SMEAR WET MOUNT SALINE/INK: CPT

## 2019-01-01 PROCEDURE — 94660 CPAP INITIATION&MGMT: CPT

## 2019-01-01 PROCEDURE — 87804 INFLUENZA ASSAY W/OPTIC: CPT | Performed by: EMERGENCY MEDICINE

## 2019-01-01 PROCEDURE — 80048 BASIC METABOLIC PNL TOTAL CA: CPT | Performed by: PHYSICIAN ASSISTANT

## 2019-01-01 PROCEDURE — 85730 THROMBOPLASTIN TIME PARTIAL: CPT | Performed by: EMERGENCY MEDICINE

## 2019-01-01 PROCEDURE — G0463 HOSPITAL OUTPT CLINIC VISIT: HCPCS

## 2019-01-01 PROCEDURE — 25000125 ZZHC RX 250: Performed by: EMERGENCY MEDICINE

## 2019-01-01 PROCEDURE — 93970 EXTREMITY STUDY: CPT

## 2019-01-01 PROCEDURE — 87305 ASPERGILLUS AG IA: CPT

## 2019-01-01 PROCEDURE — 99223 1ST HOSP IP/OBS HIGH 75: CPT | Mod: AI | Performed by: HOSPITALIST

## 2019-01-01 PROCEDURE — 40000256 ZZH STATISTIC CARDIOPULM RESUSCITATION

## 2019-01-01 PROCEDURE — 83690 ASSAY OF LIPASE: CPT | Performed by: EMERGENCY MEDICINE

## 2019-01-01 PROCEDURE — 5A1955Z RESPIRATORY VENTILATION, GREATER THAN 96 CONSECUTIVE HOURS: ICD-10-PCS | Performed by: INTERNAL MEDICINE

## 2019-01-01 PROCEDURE — 82805 BLOOD GASES W/O2 SATURATION: CPT

## 2019-01-01 PROCEDURE — 84484 ASSAY OF TROPONIN QUANT: CPT | Performed by: INTERNAL MEDICINE

## 2019-01-01 PROCEDURE — 40000986 XR ABDOMEN PORT 1 VW

## 2019-01-01 PROCEDURE — 86850 RBC ANTIBODY SCREEN: CPT | Performed by: EMERGENCY MEDICINE

## 2019-01-01 PROCEDURE — 25800030 ZZH RX IP 258 OP 636: Performed by: HOSPITALIST

## 2019-01-01 PROCEDURE — 40000501 ZZHCL STATISTIC HEMATOCRIT ED POCT

## 2019-01-01 PROCEDURE — 84478 ASSAY OF TRIGLYCERIDES: CPT

## 2019-01-01 PROCEDURE — 74018 RADEX ABDOMEN 1 VIEW: CPT

## 2019-01-01 PROCEDURE — 27210338 ZZH CIRCUIT HUMID FACE/TRACH MSK

## 2019-01-01 PROCEDURE — 82805 BLOOD GASES W/O2 SATURATION: CPT | Performed by: STUDENT IN AN ORGANIZED HEALTH CARE EDUCATION/TRAINING PROGRAM

## 2019-01-01 PROCEDURE — 87186 SC STD MICRODIL/AGAR DIL: CPT | Performed by: EMERGENCY MEDICINE

## 2019-01-01 PROCEDURE — 81001 URINALYSIS AUTO W/SCOPE: CPT

## 2019-01-01 PROCEDURE — 80076 HEPATIC FUNCTION PANEL: CPT

## 2019-01-01 PROCEDURE — 87088 URINE BACTERIA CULTURE: CPT | Performed by: EMERGENCY MEDICINE

## 2019-01-01 PROCEDURE — 99292 CRITICAL CARE ADDL 30 MIN: CPT | Performed by: PHYSICIAN ASSISTANT

## 2019-01-01 PROCEDURE — 80048 BASIC METABOLIC PNL TOTAL CA: CPT

## 2019-01-01 PROCEDURE — 96375 TX/PRO/DX INJ NEW DRUG ADDON: CPT

## 2019-01-01 PROCEDURE — 94002 VENT MGMT INPAT INIT DAY: CPT

## 2019-01-01 PROCEDURE — 25800025 ZZH RX 258: Performed by: INTERNAL MEDICINE

## 2019-01-01 PROCEDURE — 84100 ASSAY OF PHOSPHORUS: CPT | Performed by: PHYSICIAN ASSISTANT

## 2019-01-01 PROCEDURE — 71045 X-RAY EXAM CHEST 1 VIEW: CPT | Mod: 77

## 2019-01-01 PROCEDURE — 25500064 ZZH RX 255 OP 636: Performed by: HOSPITALIST

## 2019-01-01 PROCEDURE — 25000128 H RX IP 250 OP 636: Performed by: STUDENT IN AN ORGANIZED HEALTH CARE EDUCATION/TRAINING PROGRAM

## 2019-01-01 PROCEDURE — 85025 COMPLETE CBC W/AUTO DIFF WBC: CPT | Performed by: EMERGENCY MEDICINE

## 2019-01-01 PROCEDURE — 87077 CULTURE AEROBIC IDENTIFY: CPT

## 2019-01-01 PROCEDURE — 87640 STAPH A DNA AMP PROBE: CPT

## 2019-01-01 PROCEDURE — 87077 CULTURE AEROBIC IDENTIFY: CPT | Performed by: PHYSICIAN ASSISTANT

## 2019-01-01 PROCEDURE — 82805 BLOOD GASES W/O2 SATURATION: CPT | Performed by: PHYSICIAN ASSISTANT

## 2019-01-01 PROCEDURE — 83735 ASSAY OF MAGNESIUM: CPT | Performed by: HOSPITALIST

## 2019-01-01 RX ORDER — METHYLPREDNISOLONE SODIUM SUCCINATE 40 MG/ML
40 INJECTION, POWDER, LYOPHILIZED, FOR SOLUTION INTRAMUSCULAR; INTRAVENOUS EVERY 24 HOURS
Status: DISCONTINUED | OUTPATIENT
Start: 2019-01-01 | End: 2019-01-01

## 2019-01-01 RX ORDER — POTASSIUM CHLORIDE 1.5 G/1.58G
20-40 POWDER, FOR SOLUTION ORAL
Status: DISCONTINUED | OUTPATIENT
Start: 2019-01-01 | End: 2019-12-05 | Stop reason: HOSPADM

## 2019-01-01 RX ORDER — METHYLPREDNISOLONE SODIUM SUCCINATE 40 MG/ML
40 INJECTION, POWDER, LYOPHILIZED, FOR SOLUTION INTRAMUSCULAR; INTRAVENOUS EVERY 8 HOURS
Status: DISCONTINUED | OUTPATIENT
Start: 2019-01-01 | End: 2019-01-01

## 2019-01-01 RX ORDER — SODIUM CHLORIDE 9 MG/ML
INJECTION, SOLUTION INTRAVENOUS CONTINUOUS
Status: DISCONTINUED | OUTPATIENT
Start: 2019-01-01 | End: 2019-12-05 | Stop reason: HOSPADM

## 2019-01-01 RX ORDER — DEXTROSE MONOHYDRATE 25 G/50ML
25-50 INJECTION, SOLUTION INTRAVENOUS
Status: DISCONTINUED | OUTPATIENT
Start: 2019-01-01 | End: 2019-01-01

## 2019-01-01 RX ORDER — PROPOFOL 10 MG/ML
10-80 INJECTION, EMULSION INTRAVENOUS CONTINUOUS
Status: DISCONTINUED | OUTPATIENT
Start: 2019-01-01 | End: 2019-01-01

## 2019-01-01 RX ORDER — DANTROLENE 20 MG/1
2.5 INJECTION, POWDER, FOR SOLUTION INTRAVENOUS ONCE
Status: DISCONTINUED | OUTPATIENT
Start: 2019-01-01 | End: 2019-01-01

## 2019-01-01 RX ORDER — ONDANSETRON 4 MG/1
4 TABLET, ORALLY DISINTEGRATING ORAL EVERY 6 HOURS PRN
Status: DISCONTINUED | OUTPATIENT
Start: 2019-01-01 | End: 2019-12-05 | Stop reason: HOSPADM

## 2019-01-01 RX ORDER — AMOXICILLIN 250 MG
2 CAPSULE ORAL 2 TIMES DAILY PRN
Status: DISCONTINUED | OUTPATIENT
Start: 2019-01-01 | End: 2019-01-01

## 2019-01-01 RX ORDER — ACETAMINOPHEN 500 MG
500 TABLET ORAL EVERY 6 HOURS PRN
Status: DISCONTINUED | OUTPATIENT
Start: 2019-01-01 | End: 2019-01-01 | Stop reason: CLARIF

## 2019-01-01 RX ORDER — VECURONIUM BROMIDE 1 MG/ML
10 INJECTION, POWDER, FOR SOLUTION INTRAVENOUS ONCE
Status: DISCONTINUED | OUTPATIENT
Start: 2019-01-01 | End: 2019-12-05 | Stop reason: HOSPADM

## 2019-01-01 RX ORDER — LIDOCAINE HYDROCHLORIDE 20 MG/ML
JELLY TOPICAL ONCE
Status: COMPLETED | OUTPATIENT
Start: 2019-01-01 | End: 2019-01-01

## 2019-01-01 RX ORDER — NALOXONE HYDROCHLORIDE 0.4 MG/ML
.1-.4 INJECTION, SOLUTION INTRAMUSCULAR; INTRAVENOUS; SUBCUTANEOUS
Status: DISCONTINUED | OUTPATIENT
Start: 2019-01-01 | End: 2019-01-01

## 2019-01-01 RX ORDER — AMINO AC/PROTEIN HYDR/WHEY PRO 10G-100/30
1 LIQUID (ML) ORAL
Status: DISCONTINUED | OUTPATIENT
Start: 2019-01-01 | End: 2019-01-01 | Stop reason: CLARIF

## 2019-01-01 RX ORDER — METHYLPREDNISOLONE SODIUM SUCCINATE 125 MG/2ML
125 INJECTION, POWDER, LYOPHILIZED, FOR SOLUTION INTRAMUSCULAR; INTRAVENOUS ONCE
Status: COMPLETED | OUTPATIENT
Start: 2019-01-01 | End: 2019-01-01

## 2019-01-01 RX ORDER — SODIUM POLYSTYRENE SULFONATE 15 G/60ML
30 SUSPENSION ORAL; RECTAL ONCE
Status: COMPLETED | OUTPATIENT
Start: 2019-01-01 | End: 2019-01-01

## 2019-01-01 RX ORDER — FUROSEMIDE 10 MG/ML
40 INJECTION INTRAMUSCULAR; INTRAVENOUS EVERY 8 HOURS
Status: DISCONTINUED | OUTPATIENT
Start: 2019-01-01 | End: 2019-01-01

## 2019-01-01 RX ORDER — POTASSIUM CL/LIDO/0.9 % NACL 10MEQ/0.1L
10 INTRAVENOUS SOLUTION, PIGGYBACK (ML) INTRAVENOUS
Status: DISCONTINUED | OUTPATIENT
Start: 2019-01-01 | End: 2019-12-05 | Stop reason: HOSPADM

## 2019-01-01 RX ORDER — PROCHLORPERAZINE 25 MG
25 SUPPOSITORY, RECTAL RECTAL EVERY 12 HOURS PRN
Status: DISCONTINUED | OUTPATIENT
Start: 2019-01-01 | End: 2019-12-05 | Stop reason: HOSPADM

## 2019-01-01 RX ORDER — OSELTAMIVIR PHOSPHATE 6 MG/ML
150 FOR SUSPENSION ORAL 2 TIMES DAILY
Status: COMPLETED | OUTPATIENT
Start: 2019-01-01 | End: 2019-01-01

## 2019-01-01 RX ORDER — POTASSIUM CHLORIDE 29.8 MG/ML
20 INJECTION INTRAVENOUS
Status: DISCONTINUED | OUTPATIENT
Start: 2019-01-01 | End: 2019-12-05 | Stop reason: HOSPADM

## 2019-01-01 RX ORDER — LIDOCAINE HYDROCHLORIDE 10 MG/ML
INJECTION, SOLUTION INFILTRATION; PERINEURAL
Status: COMPLETED
Start: 2019-01-01 | End: 2019-01-01

## 2019-01-01 RX ORDER — DEXTROSE MONOHYDRATE 25 G/50ML
INJECTION, SOLUTION INTRAVENOUS
Status: COMPLETED
Start: 2019-01-01 | End: 2019-01-01

## 2019-01-01 RX ORDER — IPRATROPIUM BROMIDE AND ALBUTEROL SULFATE 2.5; .5 MG/3ML; MG/3ML
3 SOLUTION RESPIRATORY (INHALATION)
Status: DISCONTINUED | OUTPATIENT
Start: 2019-01-01 | End: 2019-01-01

## 2019-01-01 RX ORDER — FUROSEMIDE 10 MG/ML
INJECTION INTRAMUSCULAR; INTRAVENOUS
Status: DISPENSED
Start: 2019-01-01 | End: 2019-01-01

## 2019-01-01 RX ORDER — IPRATROPIUM BROMIDE AND ALBUTEROL SULFATE 2.5; .5 MG/3ML; MG/3ML
SOLUTION RESPIRATORY (INHALATION)
Status: COMPLETED
Start: 2019-01-01 | End: 2019-01-01

## 2019-01-01 RX ORDER — NICOTINE POLACRILEX 4 MG
15-30 LOZENGE BUCCAL
Status: DISCONTINUED | OUTPATIENT
Start: 2019-01-01 | End: 2019-01-01

## 2019-01-01 RX ORDER — DEXMEDETOMIDINE HYDROCHLORIDE 4 UG/ML
.2-1.2 INJECTION, SOLUTION INTRAVENOUS CONTINUOUS
Status: DISCONTINUED | OUTPATIENT
Start: 2019-01-01 | End: 2019-01-01

## 2019-01-01 RX ORDER — METOPROLOL TARTRATE 25 MG/1
25 TABLET, FILM COATED ORAL 2 TIMES DAILY
Status: DISCONTINUED | OUTPATIENT
Start: 2019-01-01 | End: 2019-01-01

## 2019-01-01 RX ORDER — DEXTROSE MONOHYDRATE 100 MG/ML
INJECTION, SOLUTION INTRAVENOUS CONTINUOUS
Status: ACTIVE | OUTPATIENT
Start: 2019-01-01 | End: 2019-01-01

## 2019-01-01 RX ORDER — PREDNISONE 5 MG/ML
40 SOLUTION ORAL DAILY
Status: DISCONTINUED | OUTPATIENT
Start: 2019-12-05 | End: 2019-12-05 | Stop reason: HOSPADM

## 2019-01-01 RX ORDER — AMOXICILLIN 250 MG
1 CAPSULE ORAL 2 TIMES DAILY PRN
Status: DISCONTINUED | OUTPATIENT
Start: 2019-01-01 | End: 2019-01-01

## 2019-01-01 RX ORDER — MAGNESIUM SULFATE HEPTAHYDRATE 40 MG/ML
2 INJECTION, SOLUTION INTRAVENOUS ONCE
Status: COMPLETED | OUTPATIENT
Start: 2019-01-01 | End: 2019-01-01

## 2019-01-01 RX ORDER — HEPARIN SODIUM 5000 [USP'U]/.5ML
5000 INJECTION, SOLUTION INTRAVENOUS; SUBCUTANEOUS EVERY 8 HOURS
Status: DISCONTINUED | OUTPATIENT
Start: 2019-01-01 | End: 2019-01-01 | Stop reason: CLARIF

## 2019-01-01 RX ORDER — ALBUTEROL SULFATE 0.83 MG/ML
2.5 SOLUTION RESPIRATORY (INHALATION) EVERY 6 HOURS PRN
Status: DISCONTINUED | OUTPATIENT
Start: 2019-01-01 | End: 2019-01-01

## 2019-01-01 RX ORDER — SODIUM CHLORIDE 9 MG/ML
INJECTION, SOLUTION INTRAVENOUS CONTINUOUS
Status: DISCONTINUED | OUTPATIENT
Start: 2019-01-01 | End: 2019-01-01

## 2019-01-01 RX ORDER — CHLOROTHIAZIDE SODIUM 500 MG/1
500 INJECTION INTRAVENOUS EVERY 12 HOURS
Status: DISCONTINUED | OUTPATIENT
Start: 2019-01-01 | End: 2019-12-05 | Stop reason: HOSPADM

## 2019-01-01 RX ORDER — ALBUMIN, HUMAN INJ 5% 5 %
SOLUTION INTRAVENOUS
Status: COMPLETED
Start: 2019-01-01 | End: 2019-01-01

## 2019-01-01 RX ORDER — NOREPINEPHRINE BITARTRATE 0.06 MG/ML
0.03-0.4 INJECTION, SOLUTION INTRAVENOUS CONTINUOUS
Status: DISCONTINUED | OUTPATIENT
Start: 2019-01-01 | End: 2019-12-05 | Stop reason: HOSPADM

## 2019-01-01 RX ORDER — AZITHROMYCIN 500 MG/1
500 INJECTION, POWDER, LYOPHILIZED, FOR SOLUTION INTRAVENOUS ONCE
Status: COMPLETED | OUTPATIENT
Start: 2019-01-01 | End: 2019-01-01

## 2019-01-01 RX ORDER — LIDOCAINE HYDROCHLORIDE 20 MG/ML
JELLY TOPICAL
Status: COMPLETED
Start: 2019-01-01 | End: 2019-01-01

## 2019-01-01 RX ORDER — AZITHROMYCIN 500 MG/1
500 INJECTION, POWDER, LYOPHILIZED, FOR SOLUTION INTRAVENOUS EVERY 24 HOURS
Status: CANCELLED | OUTPATIENT
Start: 2019-01-01

## 2019-01-01 RX ORDER — AZITHROMYCIN 500 MG/1
500 INJECTION, POWDER, LYOPHILIZED, FOR SOLUTION INTRAVENOUS EVERY 24 HOURS
Status: DISCONTINUED | OUTPATIENT
Start: 2019-01-01 | End: 2019-01-01

## 2019-01-01 RX ORDER — HEPARIN SODIUM 10000 [USP'U]/100ML
1300 INJECTION, SOLUTION INTRAVENOUS CONTINUOUS
Status: DISCONTINUED | OUTPATIENT
Start: 2019-01-01 | End: 2019-12-05 | Stop reason: HOSPADM

## 2019-01-01 RX ORDER — CEFTRIAXONE 1 G/1
1 INJECTION, POWDER, FOR SOLUTION INTRAMUSCULAR; INTRAVENOUS EVERY 24 HOURS
Status: DISCONTINUED | OUTPATIENT
Start: 2019-01-01 | End: 2019-01-01

## 2019-01-01 RX ORDER — AMOXICILLIN 250 MG
2 CAPSULE ORAL 2 TIMES DAILY PRN
Status: DISCONTINUED | OUTPATIENT
Start: 2019-01-01 | End: 2019-12-05 | Stop reason: HOSPADM

## 2019-01-01 RX ORDER — POTASSIUM CHLORIDE 1500 MG/1
20-40 TABLET, EXTENDED RELEASE ORAL
Status: DISCONTINUED | OUTPATIENT
Start: 2019-01-01 | End: 2019-12-05 | Stop reason: HOSPADM

## 2019-01-01 RX ORDER — ASPIRIN 325 MG
325 TABLET ORAL DAILY
Status: DISCONTINUED | OUTPATIENT
Start: 2019-01-01 | End: 2019-12-05 | Stop reason: HOSPADM

## 2019-01-01 RX ORDER — PIPERACILLIN SODIUM, TAZOBACTAM SODIUM 3; .375 G/15ML; G/15ML
3.38 INJECTION, POWDER, LYOPHILIZED, FOR SOLUTION INTRAVENOUS EVERY 6 HOURS
Status: DISCONTINUED | OUTPATIENT
Start: 2019-01-01 | End: 2019-01-01

## 2019-01-01 RX ORDER — ALBUTEROL SULFATE 5 MG/ML
10 SOLUTION, NON-ORAL INHALATION CONTINUOUS PRN
Status: DISPENSED | OUTPATIENT
Start: 2019-01-01 | End: 2019-01-01

## 2019-01-01 RX ORDER — WATER 10 ML/10ML
INJECTION INTRAMUSCULAR; INTRAVENOUS; SUBCUTANEOUS
Status: COMPLETED
Start: 2019-01-01 | End: 2019-01-01

## 2019-01-01 RX ORDER — HYDROMORPHONE HYDROCHLORIDE 1 MG/ML
0.2 INJECTION, SOLUTION INTRAMUSCULAR; INTRAVENOUS; SUBCUTANEOUS
Status: DISCONTINUED | OUTPATIENT
Start: 2019-01-01 | End: 2019-12-05 | Stop reason: HOSPADM

## 2019-01-01 RX ORDER — CEFTRIAXONE 2 G/1
2 INJECTION, POWDER, FOR SOLUTION INTRAMUSCULAR; INTRAVENOUS EVERY 24 HOURS
Status: DISCONTINUED | OUTPATIENT
Start: 2019-01-01 | End: 2019-01-01

## 2019-01-01 RX ORDER — PROPOFOL 10 MG/ML
INJECTION, EMULSION INTRAVENOUS PRN
Status: DISCONTINUED | OUTPATIENT
Start: 2019-01-01 | End: 2019-01-01

## 2019-01-01 RX ORDER — NYSTATIN 100000/ML
500000 SUSPENSION, ORAL (FINAL DOSE FORM) ORAL 4 TIMES DAILY
Status: DISCONTINUED | OUTPATIENT
Start: 2019-01-01 | End: 2019-01-01

## 2019-01-01 RX ORDER — METOPROLOL TARTRATE 50 MG
50 TABLET ORAL 2 TIMES DAILY
Status: DISCONTINUED | OUTPATIENT
Start: 2019-01-01 | End: 2019-01-01

## 2019-01-01 RX ORDER — PANTOPRAZOLE SODIUM 40 MG/1
40 TABLET, DELAYED RELEASE ORAL DAILY
Status: DISCONTINUED | OUTPATIENT
Start: 2019-01-01 | End: 2019-12-05 | Stop reason: HOSPADM

## 2019-01-01 RX ORDER — CEFTRIAXONE 2 G/1
2 INJECTION, POWDER, FOR SOLUTION INTRAMUSCULAR; INTRAVENOUS EVERY 24 HOURS
Status: COMPLETED | OUTPATIENT
Start: 2019-01-01 | End: 2019-01-01

## 2019-01-01 RX ORDER — CETIRIZINE HYDROCHLORIDE 10 MG/1
10 TABLET ORAL DAILY
COMMUNITY

## 2019-01-01 RX ORDER — POTASSIUM CHLORIDE 7.45 MG/ML
10 INJECTION INTRAVENOUS
Status: DISCONTINUED | OUTPATIENT
Start: 2019-01-01 | End: 2019-12-05 | Stop reason: HOSPADM

## 2019-01-01 RX ORDER — OSELTAMIVIR PHOSPHATE 6 MG/ML
60 FOR SUSPENSION ORAL 2 TIMES DAILY
Status: DISCONTINUED | OUTPATIENT
Start: 2019-01-01 | End: 2019-01-01

## 2019-01-01 RX ORDER — ALBUMIN, HUMAN INJ 5% 5 %
12.5 SOLUTION INTRAVENOUS ONCE
Status: COMPLETED | OUTPATIENT
Start: 2019-01-01 | End: 2019-01-01

## 2019-01-01 RX ORDER — VECURONIUM BROMIDE 1 MG/ML
10 INJECTION, POWDER, FOR SOLUTION INTRAVENOUS ONCE
Status: COMPLETED | OUTPATIENT
Start: 2019-01-01 | End: 2019-01-01

## 2019-01-01 RX ORDER — LIDOCAINE HYDROCHLORIDE 10 MG/ML
10 INJECTION, SOLUTION EPIDURAL; INFILTRATION; INTRACAUDAL; PERINEURAL ONCE
Status: COMPLETED | OUTPATIENT
Start: 2019-01-01 | End: 2019-01-01

## 2019-01-01 RX ORDER — DEXTROSE MONOHYDRATE 25 G/50ML
25 INJECTION, SOLUTION INTRAVENOUS ONCE
Status: COMPLETED | OUTPATIENT
Start: 2019-01-01 | End: 2019-01-01

## 2019-01-01 RX ORDER — AMOXICILLIN 250 MG
1 CAPSULE ORAL 2 TIMES DAILY PRN
Status: DISCONTINUED | OUTPATIENT
Start: 2019-01-01 | End: 2019-12-05 | Stop reason: HOSPADM

## 2019-01-01 RX ORDER — DANTROLENE 20 MG/1
420 INJECTION, POWDER, FOR SOLUTION INTRAVENOUS ONCE
Status: COMPLETED | OUTPATIENT
Start: 2019-01-01 | End: 2019-01-01

## 2019-01-01 RX ORDER — METOPROLOL TARTRATE 25 MG/1
25 TABLET, FILM COATED ORAL ONCE
Status: COMPLETED | OUTPATIENT
Start: 2019-01-01 | End: 2019-01-01

## 2019-01-01 RX ORDER — CHLORHEXIDINE GLUCONATE ORAL RINSE 1.2 MG/ML
15 SOLUTION DENTAL EVERY 12 HOURS
Status: DISCONTINUED | OUTPATIENT
Start: 2019-01-01 | End: 2019-12-05 | Stop reason: HOSPADM

## 2019-01-01 RX ORDER — DEXMEDETOMIDINE HYDROCHLORIDE 4 UG/ML
.2-1.2 INJECTION, SOLUTION INTRAVENOUS CONTINUOUS
Status: DISCONTINUED | OUTPATIENT
Start: 2019-01-01 | End: 2019-12-05 | Stop reason: HOSPADM

## 2019-01-01 RX ORDER — MAGNESIUM SULFATE HEPTAHYDRATE 40 MG/ML
4 INJECTION, SOLUTION INTRAVENOUS EVERY 4 HOURS PRN
Status: DISCONTINUED | OUTPATIENT
Start: 2019-01-01 | End: 2019-12-05 | Stop reason: HOSPADM

## 2019-01-01 RX ORDER — NICOTINE POLACRILEX 4 MG
15-30 LOZENGE BUCCAL
Status: DISCONTINUED | OUTPATIENT
Start: 2019-01-01 | End: 2019-12-05 | Stop reason: HOSPADM

## 2019-01-01 RX ORDER — DEXTROSE MONOHYDRATE 25 G/50ML
25-50 INJECTION, SOLUTION INTRAVENOUS
Status: DISCONTINUED | OUTPATIENT
Start: 2019-01-01 | End: 2019-12-05 | Stop reason: HOSPADM

## 2019-01-01 RX ORDER — FENTANYL CITRATE 50 UG/ML
100 INJECTION, SOLUTION INTRAMUSCULAR; INTRAVENOUS ONCE
Status: COMPLETED | OUTPATIENT
Start: 2019-01-01 | End: 2019-01-01

## 2019-01-01 RX ORDER — METOPROLOL TARTRATE 50 MG
50 TABLET ORAL 2 TIMES DAILY
Status: DISCONTINUED | OUTPATIENT
Start: 2019-01-01 | End: 2019-12-05 | Stop reason: HOSPADM

## 2019-01-01 RX ORDER — MAGNESIUM SULFATE HEPTAHYDRATE 40 MG/ML
2 INJECTION, SOLUTION INTRAVENOUS DAILY PRN
Status: DISCONTINUED | OUTPATIENT
Start: 2019-01-01 | End: 2019-12-05 | Stop reason: HOSPADM

## 2019-01-01 RX ORDER — NALOXONE HYDROCHLORIDE 0.4 MG/ML
.1-.4 INJECTION, SOLUTION INTRAMUSCULAR; INTRAVENOUS; SUBCUTANEOUS
Status: DISCONTINUED | OUTPATIENT
Start: 2019-01-01 | End: 2019-12-05 | Stop reason: HOSPADM

## 2019-01-01 RX ORDER — OSELTAMIVIR PHOSPHATE 30 MG/1
30 CAPSULE ORAL 2 TIMES DAILY
Status: CANCELLED | OUTPATIENT
Start: 2019-01-01

## 2019-01-01 RX ORDER — METOPROLOL TARTRATE 1 MG/ML
5 INJECTION, SOLUTION INTRAVENOUS EVERY 5 MIN PRN
Status: DISCONTINUED | OUTPATIENT
Start: 2019-01-01 | End: 2019-12-05 | Stop reason: HOSPADM

## 2019-01-01 RX ORDER — METOPROLOL TARTRATE 25 MG/1
25 TABLET, FILM COATED ORAL 3 TIMES DAILY
Status: COMPLETED | OUTPATIENT
Start: 2019-01-01 | End: 2019-01-01

## 2019-01-01 RX ORDER — METOPROLOL TARTRATE 25 MG/1
25 TABLET, FILM COATED ORAL 3 TIMES DAILY
Status: DISCONTINUED | OUTPATIENT
Start: 2019-01-01 | End: 2019-01-01

## 2019-01-01 RX ORDER — PROCHLORPERAZINE MALEATE 5 MG
10 TABLET ORAL EVERY 6 HOURS PRN
Status: DISCONTINUED | OUTPATIENT
Start: 2019-01-01 | End: 2019-12-05 | Stop reason: HOSPADM

## 2019-01-01 RX ORDER — LEVALBUTEROL 1.25 MG/.5ML
1.25 SOLUTION, CONCENTRATE RESPIRATORY (INHALATION)
Status: DISCONTINUED | OUTPATIENT
Start: 2019-01-01 | End: 2019-12-05 | Stop reason: HOSPADM

## 2019-01-01 RX ORDER — METOCLOPRAMIDE HYDROCHLORIDE 5 MG/ML
10 INJECTION INTRAMUSCULAR; INTRAVENOUS ONCE
Status: COMPLETED | OUTPATIENT
Start: 2019-01-01 | End: 2019-01-01

## 2019-01-01 RX ORDER — CALCIUM CHLORIDE 100 MG/ML
INJECTION INTRAVENOUS; INTRAVENTRICULAR
Status: DISCONTINUED
Start: 2019-01-01 | End: 2019-12-05 | Stop reason: HOSPADM

## 2019-01-01 RX ORDER — FENTANYL CITRATE 50 UG/ML
INJECTION, SOLUTION INTRAMUSCULAR; INTRAVENOUS
Status: COMPLETED
Start: 2019-01-01 | End: 2019-01-01

## 2019-01-01 RX ORDER — ONDANSETRON 2 MG/ML
4 INJECTION INTRAMUSCULAR; INTRAVENOUS EVERY 6 HOURS PRN
Status: DISCONTINUED | OUTPATIENT
Start: 2019-01-01 | End: 2019-12-05 | Stop reason: HOSPADM

## 2019-01-01 RX ORDER — PIPERACILLIN SODIUM, TAZOBACTAM SODIUM 2; .25 G/10ML; G/10ML
2.25 INJECTION, POWDER, LYOPHILIZED, FOR SOLUTION INTRAVENOUS EVERY 6 HOURS
Status: DISCONTINUED | OUTPATIENT
Start: 2019-01-01 | End: 2019-01-01

## 2019-01-01 RX ADMIN — HEPARIN SODIUM 5000 UNITS: 5000 INJECTION, SOLUTION INTRAVENOUS; SUBCUTANEOUS at 05:48

## 2019-01-01 RX ADMIN — VASOPRESSIN 2.4 UNITS/HR: 20 INJECTION INTRAVENOUS at 18:18

## 2019-01-01 RX ADMIN — CHLORHEXIDINE GLUCONATE 15 ML: 1.2 RINSE ORAL at 19:47

## 2019-01-01 RX ADMIN — METOPROLOL TARTRATE 25 MG: 25 TABLET ORAL at 19:58

## 2019-01-01 RX ADMIN — INSULIN ASPART 1 UNITS: 100 INJECTION, SOLUTION INTRAVENOUS; SUBCUTANEOUS at 10:21

## 2019-01-01 RX ADMIN — HEPARIN SODIUM 5000 UNITS: 5000 INJECTION, SOLUTION INTRAVENOUS; SUBCUTANEOUS at 05:26

## 2019-01-01 RX ADMIN — SODIUM CHLORIDE 1000 ML: 9 INJECTION, SOLUTION INTRAVENOUS at 22:07

## 2019-01-01 RX ADMIN — CHLORHEXIDINE GLUCONATE 15 ML: 1.2 RINSE ORAL at 19:43

## 2019-01-01 RX ADMIN — PROPOFOL 30 MCG/KG/MIN: 10 INJECTION, EMULSION INTRAVENOUS at 08:34

## 2019-01-01 RX ADMIN — LEVALBUTEROL HYDROCHLORIDE 1.25 MG: 1.25 SOLUTION, CONCENTRATE RESPIRATORY (INHALATION) at 18:56

## 2019-01-01 RX ADMIN — IPRATROPIUM BROMIDE AND ALBUTEROL SULFATE 3 ML: .5; 3 SOLUTION RESPIRATORY (INHALATION) at 15:35

## 2019-01-01 RX ADMIN — FUROSEMIDE 40 MG: 10 INJECTION, SOLUTION INTRAVENOUS at 09:41

## 2019-01-01 RX ADMIN — CHLORHEXIDINE GLUCONATE 15 ML: 1.2 RINSE ORAL at 21:28

## 2019-01-01 RX ADMIN — METOPROLOL TARTRATE 50 MG: 50 TABLET, FILM COATED ORAL at 21:50

## 2019-01-01 RX ADMIN — Medication 40 MG: at 08:45

## 2019-01-01 RX ADMIN — AMIODARONE HYDROCHLORIDE 150 MG: 1.5 INJECTION, SOLUTION INTRAVENOUS at 18:10

## 2019-01-01 RX ADMIN — Medication 1 PACKET: at 19:44

## 2019-01-01 RX ADMIN — METOPROLOL TARTRATE 5 MG: 5 INJECTION INTRAVENOUS at 16:24

## 2019-01-01 RX ADMIN — FENTANYL CITRATE 100 MCG: 50 INJECTION, SOLUTION INTRAMUSCULAR; INTRAVENOUS at 22:00

## 2019-01-01 RX ADMIN — PROPOFOL 30 MCG/KG/MIN: 10 INJECTION, EMULSION INTRAVENOUS at 14:25

## 2019-01-01 RX ADMIN — MULTIVITAMIN 15 ML: LIQUID ORAL at 08:40

## 2019-01-01 RX ADMIN — PROPOFOL 50 MG: 10 INJECTION, EMULSION INTRAVENOUS at 02:42

## 2019-01-01 RX ADMIN — SODIUM CHLORIDE 1000 ML: 9 INJECTION, SOLUTION INTRAVENOUS at 14:43

## 2019-01-01 RX ADMIN — METOPROLOL TARTRATE 25 MG: 25 TABLET ORAL at 00:18

## 2019-01-01 RX ADMIN — Medication 1 PACKET: at 00:18

## 2019-01-01 RX ADMIN — METHYLPREDNISOLONE SODIUM SUCCINATE 40 MG: 40 INJECTION, POWDER, FOR SOLUTION INTRAMUSCULAR; INTRAVENOUS at 05:11

## 2019-01-01 RX ADMIN — METOPROLOL TARTRATE 25 MG: 25 TABLET ORAL at 21:34

## 2019-01-01 RX ADMIN — CHLOROTHIAZIDE SODIUM 500 MG: 500 INJECTION, POWDER, LYOPHILIZED, FOR SOLUTION INTRAVENOUS at 18:23

## 2019-01-01 RX ADMIN — FAMOTIDINE 20 MG: 10 INJECTION, SOLUTION INTRAVENOUS at 17:38

## 2019-01-01 RX ADMIN — OSELTAMIVIR PHOSPHATE 60 MG: 6 POWDER, FOR SUSPENSION ORAL at 08:09

## 2019-01-01 RX ADMIN — ALBUMIN HUMAN 12.5 G: 0.05 INJECTION, SOLUTION INTRAVENOUS at 21:54

## 2019-01-01 RX ADMIN — VASOPRESSIN 2.4 UNITS/HR: 20 INJECTION INTRAVENOUS at 22:40

## 2019-01-01 RX ADMIN — AZITHROMYCIN MONOHYDRATE 500 MG: 500 INJECTION, POWDER, LYOPHILIZED, FOR SOLUTION INTRAVENOUS at 14:42

## 2019-01-01 RX ADMIN — CHLORHEXIDINE GLUCONATE 15 ML: 1.2 RINSE ORAL at 07:59

## 2019-01-01 RX ADMIN — OSELTAMIVIR PHOSPHATE 150 MG: 6 POWDER, FOR SUSPENSION ORAL at 08:09

## 2019-01-01 RX ADMIN — Medication 1 PACKET: at 00:01

## 2019-01-01 RX ADMIN — INSULIN ASPART 1 UNITS: 100 INJECTION, SOLUTION INTRAVENOUS; SUBCUTANEOUS at 01:45

## 2019-01-01 RX ADMIN — METHYLPREDNISOLONE SODIUM SUCCINATE 40 MG: 40 INJECTION, POWDER, FOR SOLUTION INTRAMUSCULAR; INTRAVENOUS at 05:45

## 2019-01-01 RX ADMIN — Medication 1 PACKET: at 11:57

## 2019-01-01 RX ADMIN — PROPOFOL 30 MCG/KG/MIN: 10 INJECTION, EMULSION INTRAVENOUS at 19:46

## 2019-01-01 RX ADMIN — HYDROMORPHONE HYDROCHLORIDE 0.2 MG: 1 INJECTION, SOLUTION INTRAMUSCULAR; INTRAVENOUS; SUBCUTANEOUS at 07:52

## 2019-01-01 RX ADMIN — SODIUM CHLORIDE 10 UNITS: 9 INJECTION, SOLUTION INTRAVENOUS at 18:51

## 2019-01-01 RX ADMIN — MAGNESIUM SULFATE HEPTAHYDRATE 2 G: 40 INJECTION, SOLUTION INTRAVENOUS at 20:10

## 2019-01-01 RX ADMIN — HUMAN ALBUMIN MICROSPHERES AND PERFLUTREN 7 ML: 10; .22 INJECTION, SOLUTION INTRAVENOUS at 10:15

## 2019-01-01 RX ADMIN — NYSTATIN 500000 UNITS: 100000 SUSPENSION ORAL at 21:46

## 2019-01-01 RX ADMIN — OSELTAMIVIR PHOSPHATE 150 MG: 6 POWDER, FOR SUSPENSION ORAL at 21:46

## 2019-01-01 RX ADMIN — IPRATROPIUM BROMIDE AND ALBUTEROL SULFATE 3 ML: .5; 3 SOLUTION RESPIRATORY (INHALATION) at 15:51

## 2019-01-01 RX ADMIN — PROPOFOL 30 MCG/KG/MIN: 10 INJECTION, EMULSION INTRAVENOUS at 02:45

## 2019-01-01 RX ADMIN — METOPROLOL TARTRATE 5 MG: 5 INJECTION INTRAVENOUS at 16:03

## 2019-01-01 RX ADMIN — Medication 40 MG: at 09:04

## 2019-01-01 RX ADMIN — METOPROLOL TARTRATE 25 MG: 25 TABLET ORAL at 17:30

## 2019-01-01 RX ADMIN — PIPERACILLIN AND TAZOBACTAM 3.38 G: 3; .375 INJECTION, POWDER, LYOPHILIZED, FOR SOLUTION INTRAVENOUS at 05:25

## 2019-01-01 RX ADMIN — ACETAMINOPHEN 650 MG: 160 SUSPENSION ORAL at 13:07

## 2019-01-01 RX ADMIN — SODIUM CHLORIDE 8 UNITS/HR: 9 INJECTION, SOLUTION INTRAVENOUS at 14:02

## 2019-01-01 RX ADMIN — DEXMEDETOMIDINE 0.2 MCG/KG/HR: 100 INJECTION, SOLUTION, CONCENTRATE INTRAVENOUS at 11:44

## 2019-01-01 RX ADMIN — HEPARIN SODIUM 5000 UNITS: 5000 INJECTION, SOLUTION INTRAVENOUS; SUBCUTANEOUS at 21:46

## 2019-01-01 RX ADMIN — INSULIN ASPART 1 UNITS: 100 INJECTION, SOLUTION INTRAVENOUS; SUBCUTANEOUS at 18:44

## 2019-01-01 RX ADMIN — INSULIN ASPART 1 UNITS: 100 INJECTION, SOLUTION INTRAVENOUS; SUBCUTANEOUS at 12:54

## 2019-01-01 RX ADMIN — EPOPROSTENOL 20 NG/KG/MIN: 1.5 INJECTION, POWDER, LYOPHILIZED, FOR SOLUTION INTRAVENOUS at 06:57

## 2019-01-01 RX ADMIN — DEXMEDETOMIDINE 0.8 MCG/KG/HR: 100 INJECTION, SOLUTION, CONCENTRATE INTRAVENOUS at 14:41

## 2019-01-01 RX ADMIN — ASPIRIN 325 MG ORAL TABLET 325 MG: 325 PILL ORAL at 21:08

## 2019-01-01 RX ADMIN — PROPOFOL 35 MCG/KG/MIN: 10 INJECTION, EMULSION INTRAVENOUS at 19:58

## 2019-01-01 RX ADMIN — SODIUM CHLORIDE 2 UNITS/HR: 9 INJECTION, SOLUTION INTRAVENOUS at 07:38

## 2019-01-01 RX ADMIN — Medication 40 MG: at 08:40

## 2019-01-01 RX ADMIN — SODIUM CHLORIDE 8 UNITS/HR: 9 INJECTION, SOLUTION INTRAVENOUS at 16:34

## 2019-01-01 RX ADMIN — DEXTROSE MONOHYDRATE 25 ML: 25 INJECTION, SOLUTION INTRAVENOUS at 17:28

## 2019-01-01 RX ADMIN — ACETAMINOPHEN 650 MG: 160 SUSPENSION ORAL at 20:16

## 2019-01-01 RX ADMIN — ALBUMIN HUMAN 12.5 G: 50 SOLUTION INTRAVENOUS at 21:54

## 2019-01-01 RX ADMIN — SODIUM CHLORIDE 4 UNITS/HR: 9 INJECTION, SOLUTION INTRAVENOUS at 18:59

## 2019-01-01 RX ADMIN — NYSTATIN 500000 UNITS: 100000 SUSPENSION ORAL at 13:02

## 2019-01-01 RX ADMIN — OSELTAMIVIR PHOSPHATE 150 MG: 6 POWDER, FOR SUSPENSION ORAL at 08:40

## 2019-01-01 RX ADMIN — AMIODARONE HYDROCHLORIDE 150 MG: 1.5 INJECTION, SOLUTION INTRAVENOUS at 17:30

## 2019-01-01 RX ADMIN — IPRATROPIUM BROMIDE AND ALBUTEROL SULFATE 3 ML: .5; 3 SOLUTION RESPIRATORY (INHALATION) at 08:03

## 2019-01-01 RX ADMIN — METHYLPREDNISOLONE SODIUM SUCCINATE 40 MG: 40 INJECTION, POWDER, FOR SOLUTION INTRAMUSCULAR; INTRAVENOUS at 22:15

## 2019-01-01 RX ADMIN — EPOPROSTENOL 20 NG/KG/MIN: 1.5 INJECTION, POWDER, LYOPHILIZED, FOR SOLUTION INTRAVENOUS at 20:48

## 2019-01-01 RX ADMIN — Medication 40 MG: at 09:40

## 2019-01-01 RX ADMIN — Medication 1 PACKET: at 20:26

## 2019-01-01 RX ADMIN — METHYLPREDNISOLONE SODIUM SUCCINATE 40 MG: 40 INJECTION, POWDER, FOR SOLUTION INTRAMUSCULAR; INTRAVENOUS at 21:47

## 2019-01-01 RX ADMIN — IPRATROPIUM BROMIDE AND ALBUTEROL SULFATE 3 ML: .5; 3 SOLUTION RESPIRATORY (INHALATION) at 10:52

## 2019-01-01 RX ADMIN — Medication 1 PACKET: at 05:25

## 2019-01-01 RX ADMIN — HEPARIN SODIUM 5000 UNITS: 5000 INJECTION, SOLUTION INTRAVENOUS; SUBCUTANEOUS at 13:57

## 2019-01-01 RX ADMIN — Medication 1 PACKET: at 21:27

## 2019-01-01 RX ADMIN — HEPARIN SODIUM 5000 UNITS: 5000 INJECTION, SOLUTION INTRAVENOUS; SUBCUTANEOUS at 19:43

## 2019-01-01 RX ADMIN — IPRATROPIUM BROMIDE AND ALBUTEROL SULFATE 3 ML: .5; 3 SOLUTION RESPIRATORY (INHALATION) at 08:08

## 2019-01-01 RX ADMIN — METHYLPREDNISOLONE SODIUM SUCCINATE 40 MG: 40 INJECTION, POWDER, FOR SOLUTION INTRAMUSCULAR; INTRAVENOUS at 05:36

## 2019-01-01 RX ADMIN — SODIUM CHLORIDE: 9 INJECTION, SOLUTION INTRAVENOUS at 17:41

## 2019-01-01 RX ADMIN — NYSTATIN 500000 UNITS: 100000 SUSPENSION ORAL at 22:15

## 2019-01-01 RX ADMIN — ACETAMINOPHEN 650 MG: 160 SUSPENSION ORAL at 05:16

## 2019-01-01 RX ADMIN — IPRATROPIUM BROMIDE AND ALBUTEROL SULFATE 3 ML: .5; 3 SOLUTION RESPIRATORY (INHALATION) at 00:54

## 2019-01-01 RX ADMIN — PROPOFOL 30 MCG/KG/MIN: 10 INJECTION, EMULSION INTRAVENOUS at 11:42

## 2019-01-01 RX ADMIN — IPRATROPIUM BROMIDE AND ALBUTEROL SULFATE 3 ML: .5; 3 SOLUTION RESPIRATORY (INHALATION) at 14:28

## 2019-01-01 RX ADMIN — MAGNESIUM SULFATE HEPTAHYDRATE 2 G: 40 INJECTION, SOLUTION INTRAVENOUS at 14:12

## 2019-01-01 RX ADMIN — IPRATROPIUM BROMIDE AND ALBUTEROL SULFATE 3 ML: .5; 3 SOLUTION RESPIRATORY (INHALATION) at 07:12

## 2019-01-01 RX ADMIN — Medication 1 PACKET: at 05:37

## 2019-01-01 RX ADMIN — IPRATROPIUM BROMIDE AND ALBUTEROL SULFATE 3 ML: .5; 3 SOLUTION RESPIRATORY (INHALATION) at 20:37

## 2019-01-01 RX ADMIN — CHLORHEXIDINE GLUCONATE 15 ML: 1.2 RINSE ORAL at 19:58

## 2019-01-01 RX ADMIN — POTASSIUM CHLORIDE 20 MEQ: 1.5 POWDER, FOR SOLUTION ORAL at 18:12

## 2019-01-01 RX ADMIN — IPRATROPIUM BROMIDE AND ALBUTEROL SULFATE 3 ML: .5; 3 SOLUTION RESPIRATORY (INHALATION) at 07:58

## 2019-01-01 RX ADMIN — METOPROLOL TARTRATE 50 MG: 50 TABLET, FILM COATED ORAL at 09:04

## 2019-01-01 RX ADMIN — PIPERACILLIN AND TAZOBACTAM 3.38 G: 3; .375 INJECTION, POWDER, LYOPHILIZED, FOR SOLUTION INTRAVENOUS at 11:17

## 2019-01-01 RX ADMIN — IPRATROPIUM BROMIDE AND ALBUTEROL SULFATE 3 ML: .5; 3 SOLUTION RESPIRATORY (INHALATION) at 22:36

## 2019-01-01 RX ADMIN — DEXMEDETOMIDINE 0.8 MCG/KG/HR: 100 INJECTION, SOLUTION, CONCENTRATE INTRAVENOUS at 12:49

## 2019-01-01 RX ADMIN — METOCLOPRAMIDE 10 MG: 5 INJECTION, SOLUTION INTRAMUSCULAR; INTRAVENOUS at 16:12

## 2019-01-01 RX ADMIN — DEXMEDETOMIDINE 0.8 MCG/KG/HR: 100 INJECTION, SOLUTION, CONCENTRATE INTRAVENOUS at 20:58

## 2019-01-01 RX ADMIN — SODIUM CHLORIDE 1000 ML: 9 INJECTION, SOLUTION INTRAVENOUS at 04:57

## 2019-01-01 RX ADMIN — SODIUM CHLORIDE 3 UNITS/HR: 9 INJECTION, SOLUTION INTRAVENOUS at 18:54

## 2019-01-01 RX ADMIN — Medication 1 PACKET: at 05:24

## 2019-01-01 RX ADMIN — IPRATROPIUM BROMIDE AND ALBUTEROL SULFATE 3 ML: .5; 3 SOLUTION RESPIRATORY (INHALATION) at 20:05

## 2019-01-01 RX ADMIN — AZITHROMYCIN MONOHYDRATE 500 MG: 500 INJECTION, POWDER, LYOPHILIZED, FOR SOLUTION INTRAVENOUS at 15:40

## 2019-01-01 RX ADMIN — Medication 1 PACKET: at 18:20

## 2019-01-01 RX ADMIN — IPRATROPIUM BROMIDE AND ALBUTEROL SULFATE 3 ML: .5; 3 SOLUTION RESPIRATORY (INHALATION) at 15:08

## 2019-01-01 RX ADMIN — NYSTATIN 500000 UNITS: 100000 SUSPENSION ORAL at 17:27

## 2019-01-01 RX ADMIN — DEXMEDETOMIDINE 0.8 MCG/KG/HR: 100 INJECTION, SOLUTION, CONCENTRATE INTRAVENOUS at 19:41

## 2019-01-01 RX ADMIN — PIPERACILLIN AND TAZOBACTAM 3.38 G: 3; .375 INJECTION, POWDER, LYOPHILIZED, FOR SOLUTION INTRAVENOUS at 17:24

## 2019-01-01 RX ADMIN — PROPOFOL 35 MCG/KG/MIN: 10 INJECTION, EMULSION INTRAVENOUS at 10:07

## 2019-01-01 RX ADMIN — PROPOFOL 35 MCG/KG/MIN: 10 INJECTION, EMULSION INTRAVENOUS at 05:54

## 2019-01-01 RX ADMIN — Medication 1 PACKET: at 13:57

## 2019-01-01 RX ADMIN — METHYLPREDNISOLONE SODIUM SUCCINATE 40 MG: 40 INJECTION, POWDER, FOR SOLUTION INTRAMUSCULAR; INTRAVENOUS at 14:26

## 2019-01-01 RX ADMIN — MIDAZOLAM 1 MG/HR: 5 INJECTION INTRAMUSCULAR; INTRAVENOUS at 21:09

## 2019-01-01 RX ADMIN — HEPARIN SODIUM 5000 UNITS: 5000 INJECTION, SOLUTION INTRAVENOUS; SUBCUTANEOUS at 05:36

## 2019-01-01 RX ADMIN — NYSTATIN 500000 UNITS: 100000 SUSPENSION ORAL at 12:39

## 2019-01-01 RX ADMIN — MAGNESIUM SULFATE HEPTAHYDRATE 2 G: 40 INJECTION, SOLUTION INTRAVENOUS at 17:53

## 2019-01-01 RX ADMIN — CEFTRIAXONE SODIUM 1 G: 1 INJECTION, POWDER, FOR SOLUTION INTRAMUSCULAR; INTRAVENOUS at 22:18

## 2019-01-01 RX ADMIN — INSULIN ASPART 1 UNITS: 100 INJECTION, SOLUTION INTRAVENOUS; SUBCUTANEOUS at 06:51

## 2019-01-01 RX ADMIN — POTASSIUM CHLORIDE 40 MEQ: 1.5 POWDER, FOR SOLUTION ORAL at 10:10

## 2019-01-01 RX ADMIN — SODIUM CHLORIDE 1 UNITS/HR: 9 INJECTION, SOLUTION INTRAVENOUS at 05:11

## 2019-01-01 RX ADMIN — FUROSEMIDE 40 MG: 10 INJECTION, SOLUTION INTRAVENOUS at 09:11

## 2019-01-01 RX ADMIN — EPOPROSTENOL 20 NG/KG/MIN: 1.5 INJECTION, POWDER, LYOPHILIZED, FOR SOLUTION INTRAVENOUS at 11:25

## 2019-01-01 RX ADMIN — DEXMEDETOMIDINE 0.8 MCG/KG/HR: 100 INJECTION, SOLUTION, CONCENTRATE INTRAVENOUS at 17:47

## 2019-01-01 RX ADMIN — DEXMEDETOMIDINE 0.8 MCG/KG/HR: 100 INJECTION, SOLUTION, CONCENTRATE INTRAVENOUS at 09:50

## 2019-01-01 RX ADMIN — METOPROLOL TARTRATE 5 MG: 5 INJECTION INTRAVENOUS at 05:16

## 2019-01-01 RX ADMIN — METOPROLOL TARTRATE 25 MG: 25 TABLET ORAL at 18:12

## 2019-01-01 RX ADMIN — AZITHROMYCIN MONOHYDRATE 500 MG: 500 INJECTION, POWDER, LYOPHILIZED, FOR SOLUTION INTRAVENOUS at 15:04

## 2019-01-01 RX ADMIN — CHLORHEXIDINE GLUCONATE 15 ML: 1.2 RINSE ORAL at 08:41

## 2019-01-01 RX ADMIN — PROPOFOL 30 MCG/KG/MIN: 10 INJECTION, EMULSION INTRAVENOUS at 22:39

## 2019-01-01 RX ADMIN — AMIODARONE HYDROCHLORIDE 100 MG: 200 TABLET ORAL at 08:40

## 2019-01-01 RX ADMIN — PROPOFOL 25 MCG/KG/MIN: 10 INJECTION, EMULSION INTRAVENOUS at 11:53

## 2019-01-01 RX ADMIN — VASOPRESSIN 2.4 UNITS/HR: 20 INJECTION INTRAVENOUS at 19:43

## 2019-01-01 RX ADMIN — Medication 1 PACKET: at 19:47

## 2019-01-01 RX ADMIN — PIPERACILLIN AND TAZOBACTAM 3.38 G: 3; .375 INJECTION, POWDER, LYOPHILIZED, FOR SOLUTION INTRAVENOUS at 11:23

## 2019-01-01 RX ADMIN — POTASSIUM CHLORIDE 40 MEQ: 1.5 POWDER, FOR SOLUTION ORAL at 14:50

## 2019-01-01 RX ADMIN — PIPERACILLIN AND TAZOBACTAM 3.38 G: 3; .375 INJECTION, POWDER, LYOPHILIZED, FOR SOLUTION INTRAVENOUS at 22:43

## 2019-01-01 RX ADMIN — PROPOFOL 30 MCG/KG/MIN: 10 INJECTION, EMULSION INTRAVENOUS at 17:39

## 2019-01-01 RX ADMIN — Medication 1 PACKET: at 22:58

## 2019-01-01 RX ADMIN — SODIUM CHLORIDE, POTASSIUM CHLORIDE, SODIUM LACTATE AND CALCIUM CHLORIDE 1000 ML: 600; 310; 30; 20 INJECTION, SOLUTION INTRAVENOUS at 16:50

## 2019-01-01 RX ADMIN — PROPOFOL 35 MCG/KG/MIN: 10 INJECTION, EMULSION INTRAVENOUS at 08:45

## 2019-01-01 RX ADMIN — IPRATROPIUM BROMIDE: 0.5 SOLUTION RESPIRATORY (INHALATION) at 14:40

## 2019-01-01 RX ADMIN — IPRATROPIUM BROMIDE AND ALBUTEROL SULFATE 3 ML: .5; 3 SOLUTION RESPIRATORY (INHALATION) at 11:07

## 2019-01-01 RX ADMIN — Medication 1 PACKET: at 14:52

## 2019-01-01 RX ADMIN — MAGNESIUM SULFATE HEPTAHYDRATE 2 G: 40 INJECTION, SOLUTION INTRAVENOUS at 18:05

## 2019-01-01 RX ADMIN — IPRATROPIUM BROMIDE AND ALBUTEROL SULFATE 3 ML: .5; 3 SOLUTION RESPIRATORY (INHALATION) at 21:17

## 2019-01-01 RX ADMIN — NYSTATIN 500000 UNITS: 100000 SUSPENSION ORAL at 08:36

## 2019-01-01 RX ADMIN — SODIUM CHLORIDE 8 UNITS/HR: 9 INJECTION, SOLUTION INTRAVENOUS at 13:00

## 2019-01-01 RX ADMIN — Medication 1 PACKET: at 08:36

## 2019-01-01 RX ADMIN — PIPERACILLIN AND TAZOBACTAM 3.38 G: 3; .375 INJECTION, POWDER, LYOPHILIZED, FOR SOLUTION INTRAVENOUS at 17:33

## 2019-01-01 RX ADMIN — INSULIN ASPART 1 UNITS: 100 INJECTION, SOLUTION INTRAVENOUS; SUBCUTANEOUS at 22:15

## 2019-01-01 RX ADMIN — IPRATROPIUM BROMIDE AND ALBUTEROL SULFATE 3 ML: .5; 3 SOLUTION RESPIRATORY (INHALATION) at 11:45

## 2019-01-01 RX ADMIN — PROPOFOL 30 MCG/KG/MIN: 10 INJECTION, EMULSION INTRAVENOUS at 05:11

## 2019-01-01 RX ADMIN — AMIODARONE HYDROCHLORIDE 1 MG/MIN: 50 INJECTION, SOLUTION INTRAVENOUS at 18:43

## 2019-01-01 RX ADMIN — EPOPROSTENOL 20 NG/KG/MIN: 1.5 INJECTION, POWDER, LYOPHILIZED, FOR SOLUTION INTRAVENOUS at 17:31

## 2019-01-01 RX ADMIN — MULTIVITAMIN 15 ML: LIQUID ORAL at 08:38

## 2019-01-01 RX ADMIN — DEXMEDETOMIDINE 0.8 MCG/KG/HR: 100 INJECTION, SOLUTION, CONCENTRATE INTRAVENOUS at 08:42

## 2019-01-01 RX ADMIN — SODIUM CHLORIDE 7 UNITS/HR: 9 INJECTION, SOLUTION INTRAVENOUS at 15:45

## 2019-01-01 RX ADMIN — HEPARIN SODIUM 5000 UNITS: 5000 INJECTION, SOLUTION INTRAVENOUS; SUBCUTANEOUS at 05:13

## 2019-01-01 RX ADMIN — IPRATROPIUM BROMIDE AND ALBUTEROL SULFATE 3 ML: .5; 3 SOLUTION RESPIRATORY (INHALATION) at 11:39

## 2019-01-01 RX ADMIN — PROPOFOL 30 MCG/KG/MIN: 10 INJECTION, EMULSION INTRAVENOUS at 18:20

## 2019-01-01 RX ADMIN — MICONAZOLE NITRATE: 20 POWDER TOPICAL at 09:12

## 2019-01-01 RX ADMIN — LEVALBUTEROL HYDROCHLORIDE 1.25 MG: 1.25 SOLUTION, CONCENTRATE RESPIRATORY (INHALATION) at 12:48

## 2019-01-01 RX ADMIN — EPINEPHRINE 0.1 MCG/KG/MIN: 1 INJECTION INTRAMUSCULAR; INTRAVENOUS; SUBCUTANEOUS at 21:35

## 2019-01-01 RX ADMIN — PROPOFOL 35 MCG/KG/MIN: 10 INJECTION, EMULSION INTRAVENOUS at 07:03

## 2019-01-01 RX ADMIN — CHLOROTHIAZIDE SODIUM 500 MG: 500 INJECTION, POWDER, LYOPHILIZED, FOR SOLUTION INTRAVENOUS at 05:26

## 2019-01-01 RX ADMIN — INSULIN ASPART 2 UNITS: 100 INJECTION, SOLUTION INTRAVENOUS; SUBCUTANEOUS at 06:08

## 2019-01-01 RX ADMIN — CHLORHEXIDINE GLUCONATE 15 ML: 1.2 RINSE ORAL at 09:14

## 2019-01-01 RX ADMIN — DEXMEDETOMIDINE 0.8 MCG/KG/HR: 100 INJECTION, SOLUTION, CONCENTRATE INTRAVENOUS at 16:21

## 2019-01-01 RX ADMIN — IPRATROPIUM BROMIDE AND ALBUTEROL SULFATE 3 ML: .5; 3 SOLUTION RESPIRATORY (INHALATION) at 07:37

## 2019-01-01 RX ADMIN — Medication 1 PACKET: at 08:42

## 2019-01-01 RX ADMIN — CHLORHEXIDINE GLUCONATE 15 ML: 1.2 RINSE ORAL at 19:35

## 2019-01-01 RX ADMIN — METOPROLOL TARTRATE 25 MG: 25 TABLET ORAL at 08:40

## 2019-01-01 RX ADMIN — FUROSEMIDE 40 MG: 10 INJECTION, SOLUTION INTRAVENOUS at 05:10

## 2019-01-01 RX ADMIN — HEPARIN SODIUM 5000 UNITS: 5000 INJECTION, SOLUTION INTRAVENOUS; SUBCUTANEOUS at 20:49

## 2019-01-01 RX ADMIN — SODIUM CHLORIDE 8 UNITS/HR: 9 INJECTION, SOLUTION INTRAVENOUS at 10:49

## 2019-01-01 RX ADMIN — Medication 1 PACKET: at 11:37

## 2019-01-01 RX ADMIN — PIPERACILLIN AND TAZOBACTAM 3.38 G: 3; .375 INJECTION, POWDER, LYOPHILIZED, FOR SOLUTION INTRAVENOUS at 01:16

## 2019-01-01 RX ADMIN — Medication 1 PACKET: at 13:52

## 2019-01-01 RX ADMIN — PROPOFOL 35 MCG/KG/MIN: 10 INJECTION, EMULSION INTRAVENOUS at 17:40

## 2019-01-01 RX ADMIN — SODIUM CHLORIDE, POTASSIUM CHLORIDE, SODIUM LACTATE AND CALCIUM CHLORIDE 2000 ML: 600; 310; 30; 20 INJECTION, SOLUTION INTRAVENOUS at 15:17

## 2019-01-01 RX ADMIN — METOPROLOL TARTRATE 5 MG: 5 INJECTION INTRAVENOUS at 14:50

## 2019-01-01 RX ADMIN — METHYLPREDNISOLONE SODIUM SUCCINATE 125 MG: 125 INJECTION, POWDER, FOR SOLUTION INTRAMUSCULAR; INTRAVENOUS at 14:09

## 2019-01-01 RX ADMIN — PROPOFOL 35 MCG/KG/MIN: 10 INJECTION, EMULSION INTRAVENOUS at 15:07

## 2019-01-01 RX ADMIN — DEXMEDETOMIDINE 0.8 MCG/KG/HR: 100 INJECTION, SOLUTION, CONCENTRATE INTRAVENOUS at 00:14

## 2019-01-01 RX ADMIN — Medication 0.2 MG/HR: at 14:36

## 2019-01-01 RX ADMIN — Medication 1 PACKET: at 17:13

## 2019-01-01 RX ADMIN — SODIUM CHLORIDE 8 UNITS/HR: 9 INJECTION, SOLUTION INTRAVENOUS at 10:09

## 2019-01-01 RX ADMIN — MAGNESIUM SULFATE HEPTAHYDRATE 4 G: 40 INJECTION, SOLUTION INTRAVENOUS at 14:11

## 2019-01-01 RX ADMIN — OSELTAMIVIR PHOSPHATE 150 MG: 6 POWDER, FOR SUSPENSION ORAL at 00:16

## 2019-01-01 RX ADMIN — Medication 1 PACKET: at 08:40

## 2019-01-01 RX ADMIN — EPOPROSTENOL 20 NG/KG/MIN: 1.5 INJECTION, POWDER, LYOPHILIZED, FOR SOLUTION INTRAVENOUS at 00:01

## 2019-01-01 RX ADMIN — AMIODARONE HYDROCHLORIDE 1 MG/MIN: 50 INJECTION, SOLUTION INTRAVENOUS at 17:59

## 2019-01-01 RX ADMIN — CHLORHEXIDINE GLUCONATE 15 ML: 1.2 RINSE ORAL at 07:31

## 2019-01-01 RX ADMIN — FUROSEMIDE 40 MG: 10 INJECTION, SOLUTION INTRAVENOUS at 01:16

## 2019-01-01 RX ADMIN — MULTIVITAMIN 15 ML: LIQUID ORAL at 14:25

## 2019-01-01 RX ADMIN — HEPARIN SODIUM 5000 UNITS: 5000 INJECTION, SOLUTION INTRAVENOUS; SUBCUTANEOUS at 22:15

## 2019-01-01 RX ADMIN — SODIUM CHLORIDE 6 UNITS/HR: 9 INJECTION, SOLUTION INTRAVENOUS at 08:03

## 2019-01-01 RX ADMIN — Medication 50 MEQ: at 21:46

## 2019-01-01 RX ADMIN — METHYLPREDNISOLONE SODIUM SUCCINATE 40 MG: 40 INJECTION, POWDER, FOR SOLUTION INTRAMUSCULAR; INTRAVENOUS at 05:48

## 2019-01-01 RX ADMIN — OSELTAMIVIR PHOSPHATE 60 MG: 6 POWDER, FOR SUSPENSION ORAL at 19:47

## 2019-01-01 RX ADMIN — VANCOMYCIN HYDROCHLORIDE 2000 MG: 5 INJECTION, POWDER, LYOPHILIZED, FOR SOLUTION INTRAVENOUS at 15:04

## 2019-01-01 RX ADMIN — MAGNESIUM SULFATE HEPTAHYDRATE 2 G: 40 INJECTION, SOLUTION INTRAVENOUS at 08:36

## 2019-01-01 RX ADMIN — SODIUM CHLORIDE 3 UNITS/HR: 9 INJECTION, SOLUTION INTRAVENOUS at 14:44

## 2019-01-01 RX ADMIN — CHLORHEXIDINE GLUCONATE 15 ML: 1.2 RINSE ORAL at 08:10

## 2019-01-01 RX ADMIN — HEPARIN SODIUM 5000 UNITS: 5000 INJECTION, SOLUTION INTRAVENOUS; SUBCUTANEOUS at 21:34

## 2019-01-01 RX ADMIN — NYSTATIN 500000 UNITS: 100000 SUSPENSION ORAL at 08:40

## 2019-01-01 RX ADMIN — NYSTATIN 500000 UNITS: 100000 SUSPENSION ORAL at 09:40

## 2019-01-01 RX ADMIN — METHYLPREDNISOLONE SODIUM SUCCINATE 40 MG: 40 INJECTION, POWDER, FOR SOLUTION INTRAMUSCULAR; INTRAVENOUS at 05:18

## 2019-01-01 RX ADMIN — NYSTATIN 500000 UNITS: 100000 SUSPENSION ORAL at 21:33

## 2019-01-01 RX ADMIN — EPOPROSTENOL 20 NG/KG/MIN: 1.5 INJECTION, POWDER, LYOPHILIZED, FOR SOLUTION INTRAVENOUS at 02:24

## 2019-01-01 RX ADMIN — NOREPINEPHRINE BITARTRATE 0.2 MCG/KG/MIN: 1 INJECTION, SOLUTION, CONCENTRATE INTRAVENOUS at 17:15

## 2019-01-01 RX ADMIN — FUROSEMIDE 40 MG: 10 INJECTION, SOLUTION INTRAVENOUS at 09:51

## 2019-01-01 RX ADMIN — FUROSEMIDE 40 MG: 10 INJECTION, SOLUTION INTRAVENOUS at 18:27

## 2019-01-01 RX ADMIN — Medication 40 MG: at 08:09

## 2019-01-01 RX ADMIN — FUROSEMIDE 40 MG: 10 INJECTION, SOLUTION INTRAVENOUS at 01:30

## 2019-01-01 RX ADMIN — INSULIN ASPART 1 UNITS: 100 INJECTION, SOLUTION INTRAVENOUS; SUBCUTANEOUS at 04:15

## 2019-01-01 RX ADMIN — FUROSEMIDE 40 MG: 10 INJECTION, SOLUTION INTRAVENOUS at 18:18

## 2019-01-01 RX ADMIN — PIPERACILLIN AND TAZOBACTAM 3.38 G: 3; .375 INJECTION, POWDER, LYOPHILIZED, FOR SOLUTION INTRAVENOUS at 05:36

## 2019-01-01 RX ADMIN — LIDOCAINE HYDROCHLORIDE: 20 JELLY TOPICAL at 15:58

## 2019-01-01 RX ADMIN — ACETAMINOPHEN 650 MG: 160 SUSPENSION ORAL at 12:40

## 2019-01-01 RX ADMIN — Medication 40 MG: at 08:36

## 2019-01-01 RX ADMIN — FENTANYL CITRATE 100 MCG: 50 INJECTION, SOLUTION INTRAMUSCULAR; INTRAVENOUS at 20:02

## 2019-01-01 RX ADMIN — SODIUM CHLORIDE: 9 INJECTION, SOLUTION INTRAVENOUS at 10:34

## 2019-01-01 RX ADMIN — OSELTAMIVIR PHOSPHATE 150 MG: 6 POWDER, FOR SUSPENSION ORAL at 20:53

## 2019-01-01 RX ADMIN — IPRATROPIUM BROMIDE AND ALBUTEROL SULFATE 3 ML: .5; 3 SOLUTION RESPIRATORY (INHALATION) at 01:58

## 2019-01-01 RX ADMIN — INSULIN ASPART 1 UNITS: 100 INJECTION, SOLUTION INTRAVENOUS; SUBCUTANEOUS at 22:58

## 2019-01-01 RX ADMIN — DEXMEDETOMIDINE 0.8 MCG/KG/HR: 100 INJECTION, SOLUTION, CONCENTRATE INTRAVENOUS at 11:30

## 2019-01-01 RX ADMIN — VECURONIUM BROMIDE 10 MG: 1 INJECTION, POWDER, LYOPHILIZED, FOR SOLUTION INTRAVENOUS at 20:25

## 2019-01-01 RX ADMIN — DANTROLENE SODIUM 420 MG: 20 INJECTION INTRAVENOUS at 22:00

## 2019-01-01 RX ADMIN — METHYLPREDNISOLONE SODIUM SUCCINATE 40 MG: 40 INJECTION, POWDER, FOR SOLUTION INTRAMUSCULAR; INTRAVENOUS at 22:43

## 2019-01-01 RX ADMIN — EPOPROSTENOL 20 NG/KG/MIN: 1.5 INJECTION, POWDER, LYOPHILIZED, FOR SOLUTION INTRAVENOUS at 18:23

## 2019-01-01 RX ADMIN — DEXTROSE MONOHYDRATE: 100 INJECTION, SOLUTION INTRAVENOUS at 18:55

## 2019-01-01 RX ADMIN — HEPARIN SODIUM 5000 UNITS: 5000 INJECTION, SOLUTION INTRAVENOUS; SUBCUTANEOUS at 12:09

## 2019-01-01 RX ADMIN — PIPERACILLIN AND TAZOBACTAM 3.38 G: 3; .375 INJECTION, POWDER, LYOPHILIZED, FOR SOLUTION INTRAVENOUS at 23:34

## 2019-01-01 RX ADMIN — AMIODARONE HYDROCHLORIDE 1 MG/MIN: 50 INJECTION, SOLUTION INTRAVENOUS at 22:05

## 2019-01-01 RX ADMIN — DEXMEDETOMIDINE 0.8 MCG/KG/HR: 100 INJECTION, SOLUTION, CONCENTRATE INTRAVENOUS at 03:24

## 2019-01-01 RX ADMIN — POTASSIUM CHLORIDE 40 MEQ: 1.5 POWDER, FOR SOLUTION ORAL at 08:09

## 2019-01-01 RX ADMIN — IPRATROPIUM BROMIDE AND ALBUTEROL SULFATE 3 ML: .5; 3 SOLUTION RESPIRATORY (INHALATION) at 15:49

## 2019-01-01 RX ADMIN — PIPERACILLIN AND TAZOBACTAM 3.38 G: 3; .375 INJECTION, POWDER, LYOPHILIZED, FOR SOLUTION INTRAVENOUS at 18:20

## 2019-01-01 RX ADMIN — CEFTRIAXONE SODIUM 2 G: 2 INJECTION, POWDER, FOR SOLUTION INTRAMUSCULAR; INTRAVENOUS at 12:02

## 2019-01-01 RX ADMIN — Medication 1 PACKET: at 11:48

## 2019-01-01 RX ADMIN — LEVALBUTEROL HYDROCHLORIDE 1.25 MG: 1.25 SOLUTION, CONCENTRATE RESPIRATORY (INHALATION) at 15:52

## 2019-01-01 RX ADMIN — OSELTAMIVIR PHOSPHATE 150 MG: 6 POWDER, FOR SUSPENSION ORAL at 09:11

## 2019-01-01 RX ADMIN — METHYLPREDNISOLONE SODIUM SUCCINATE 40 MG: 40 INJECTION, POWDER, FOR SOLUTION INTRAMUSCULAR; INTRAVENOUS at 13:03

## 2019-01-01 RX ADMIN — LIDOCAINE HYDROCHLORIDE 10 ML: 10 INJECTION, SOLUTION INFILTRATION; PERINEURAL at 10:27

## 2019-01-01 RX ADMIN — SODIUM CHLORIDE 5 UNITS/HR: 9 INJECTION, SOLUTION INTRAVENOUS at 00:29

## 2019-01-01 RX ADMIN — ALBUTEROL SULFATE 10 MG/HR: 5 SOLUTION RESPIRATORY (INHALATION) at 14:28

## 2019-01-01 RX ADMIN — SODIUM BICARBONATE 50 MEQ: 84 INJECTION, SOLUTION INTRAVENOUS at 21:46

## 2019-01-01 RX ADMIN — IPRATROPIUM BROMIDE AND ALBUTEROL SULFATE 3 ML: .5; 3 SOLUTION RESPIRATORY (INHALATION) at 11:22

## 2019-01-01 RX ADMIN — SODIUM BICARBONATE: 84 INJECTION, SOLUTION INTRAVENOUS at 18:59

## 2019-01-01 RX ADMIN — VASOPRESSIN 2.4 UNITS/HR: 20 INJECTION INTRAVENOUS at 23:34

## 2019-01-01 RX ADMIN — SODIUM BICARBONATE 50 MEQ: 84 INJECTION, SOLUTION INTRAVENOUS at 15:48

## 2019-01-01 RX ADMIN — HEPARIN SODIUM 5000 UNITS: 5000 INJECTION, SOLUTION INTRAVENOUS; SUBCUTANEOUS at 05:10

## 2019-01-01 RX ADMIN — VASOPRESSIN 2.4 UNITS/HR: 20 INJECTION INTRAVENOUS at 09:16

## 2019-01-01 RX ADMIN — HYDROCORTISONE SODIUM SUCCINATE 100 MG: 100 INJECTION, POWDER, FOR SOLUTION INTRAMUSCULAR; INTRAVENOUS at 20:08

## 2019-01-01 RX ADMIN — MAGNESIUM SULFATE HEPTAHYDRATE 2 G: 40 INJECTION, SOLUTION INTRAVENOUS at 18:20

## 2019-01-01 RX ADMIN — NYSTATIN 500000 UNITS: 100000 SUSPENSION ORAL at 13:57

## 2019-01-01 RX ADMIN — CHLORHEXIDINE GLUCONATE 15 ML: 1.2 RINSE ORAL at 08:42

## 2019-01-01 RX ADMIN — HEPARIN SODIUM 5000 UNITS: 5000 INJECTION, SOLUTION INTRAVENOUS; SUBCUTANEOUS at 00:16

## 2019-01-01 RX ADMIN — DEXMEDETOMIDINE 0.7 MCG/KG/HR: 100 INJECTION, SOLUTION, CONCENTRATE INTRAVENOUS at 16:14

## 2019-01-01 RX ADMIN — SODIUM CHLORIDE: 9 INJECTION, SOLUTION INTRAVENOUS at 08:18

## 2019-01-01 RX ADMIN — IPRATROPIUM BROMIDE AND ALBUTEROL SULFATE 3 ML: .5; 3 SOLUTION RESPIRATORY (INHALATION) at 14:46

## 2019-01-01 RX ADMIN — METHYLPREDNISOLONE SODIUM SUCCINATE 40 MG: 40 INJECTION, POWDER, FOR SOLUTION INTRAMUSCULAR; INTRAVENOUS at 13:57

## 2019-01-01 RX ADMIN — PIPERACILLIN AND TAZOBACTAM 3.38 G: 3; .375 INJECTION, POWDER, LYOPHILIZED, FOR SOLUTION INTRAVENOUS at 05:44

## 2019-01-01 RX ADMIN — AZITHROMYCIN MONOHYDRATE 500 MG: 500 INJECTION, POWDER, LYOPHILIZED, FOR SOLUTION INTRAVENOUS at 14:38

## 2019-01-01 RX ADMIN — Medication 1 PACKET: at 05:56

## 2019-01-01 RX ADMIN — HEPARIN SODIUM 5000 UNITS: 5000 INJECTION, SOLUTION INTRAVENOUS; SUBCUTANEOUS at 13:02

## 2019-01-01 RX ADMIN — METHYLPREDNISOLONE SODIUM SUCCINATE 40 MG: 40 INJECTION, POWDER, FOR SOLUTION INTRAMUSCULAR; INTRAVENOUS at 05:26

## 2019-01-01 RX ADMIN — Medication 1 PACKET: at 23:35

## 2019-01-01 RX ADMIN — PROPOFOL 30 MCG/KG/MIN: 10 INJECTION, EMULSION INTRAVENOUS at 01:36

## 2019-01-01 RX ADMIN — EPOPROSTENOL 20 NG/KG/MIN: 1.5 INJECTION, POWDER, LYOPHILIZED, FOR SOLUTION INTRAVENOUS at 13:28

## 2019-01-01 RX ADMIN — MULTIVITAMIN 15 ML: LIQUID ORAL at 09:11

## 2019-01-01 RX ADMIN — PIPERACILLIN SODIUM,TAZOBACTAM SODIUM 2.25 G: 2; .25 INJECTION, POWDER, FOR SOLUTION INTRAVENOUS at 17:41

## 2019-01-01 RX ADMIN — HEPARIN SODIUM 1300 UNITS/HR: 10000 INJECTION, SOLUTION INTRAVENOUS at 10:26

## 2019-01-01 RX ADMIN — IPRATROPIUM BROMIDE AND ALBUTEROL SULFATE 3 ML: .5; 3 SOLUTION RESPIRATORY (INHALATION) at 07:17

## 2019-01-01 RX ADMIN — OSELTAMIVIR PHOSPHATE 60 MG: 6 POWDER, FOR SUSPENSION ORAL at 08:39

## 2019-01-01 RX ADMIN — METHYLPREDNISOLONE SODIUM SUCCINATE 40 MG: 40 INJECTION, POWDER, FOR SOLUTION INTRAMUSCULAR; INTRAVENOUS at 21:33

## 2019-01-01 RX ADMIN — DEXTROSE MONOHYDRATE 3 MCG/KG/MIN: 50 INJECTION, SOLUTION INTRAVENOUS at 21:15

## 2019-01-01 RX ADMIN — PROPOFOL 30 MCG/KG/MIN: 10 INJECTION, EMULSION INTRAVENOUS at 09:15

## 2019-01-01 RX ADMIN — Medication 1 PACKET: at 14:42

## 2019-01-01 RX ADMIN — IPRATROPIUM BROMIDE AND ALBUTEROL SULFATE 3 ML: .5; 3 SOLUTION RESPIRATORY (INHALATION) at 23:05

## 2019-01-01 RX ADMIN — OSELTAMIVIR PHOSPHATE 150 MG: 6 POWDER, FOR SUSPENSION ORAL at 22:02

## 2019-01-01 RX ADMIN — AMIODARONE HYDROCHLORIDE 1 MG/MIN: 50 INJECTION, SOLUTION INTRAVENOUS at 22:39

## 2019-01-01 RX ADMIN — IPRATROPIUM BROMIDE AND ALBUTEROL SULFATE 3 ML: .5; 3 SOLUTION RESPIRATORY (INHALATION) at 23:13

## 2019-01-01 RX ADMIN — METOPROLOL TARTRATE 25 MG: 25 TABLET ORAL at 08:39

## 2019-01-01 RX ADMIN — Medication 1 PACKET: at 08:09

## 2019-01-01 RX ADMIN — CEFTRIAXONE SODIUM 2 G: 2 INJECTION, POWDER, FOR SOLUTION INTRAMUSCULAR; INTRAVENOUS at 17:49

## 2019-01-01 RX ADMIN — AMIODARONE HYDROCHLORIDE 0.5 MG/MIN: 50 INJECTION, SOLUTION INTRAVENOUS at 06:12

## 2019-01-01 RX ADMIN — SODIUM CHLORIDE 10 UNITS/HR: 9 INJECTION, SOLUTION INTRAVENOUS at 07:03

## 2019-01-01 RX ADMIN — SODIUM POLYSTYRENE SULFONATE 30 G: 15 SUSPENSION ORAL; RECTAL at 20:54

## 2019-01-01 RX ADMIN — PROPOFOL 45 MCG/KG/MIN: 10 INJECTION, EMULSION INTRAVENOUS at 01:30

## 2019-01-01 RX ADMIN — LIDOCAINE HYDROCHLORIDE 10 ML: 10 INJECTION, SOLUTION EPIDURAL; INFILTRATION; INTRACAUDAL; PERINEURAL at 10:27

## 2019-01-01 RX ADMIN — METOPROLOL TARTRATE 25 MG: 25 TABLET ORAL at 08:09

## 2019-01-01 RX ADMIN — IPRATROPIUM BROMIDE AND ALBUTEROL SULFATE 3 ML: .5; 3 SOLUTION RESPIRATORY (INHALATION) at 21:52

## 2019-01-01 RX ADMIN — CEFEPIME HYDROCHLORIDE 2 G: 2 INJECTION, POWDER, FOR SOLUTION INTRAVENOUS at 14:04

## 2019-01-01 RX ADMIN — NYSTATIN 500000 UNITS: 100000 SUSPENSION ORAL at 22:43

## 2019-01-01 RX ADMIN — DEXTROSE MONOHYDRATE 25 G: 500 INJECTION PARENTERAL at 18:51

## 2019-01-01 RX ADMIN — METOPROLOL TARTRATE 25 MG: 25 TABLET ORAL at 11:49

## 2019-01-01 RX ADMIN — DEXMEDETOMIDINE 0.8 MCG/KG/HR: 100 INJECTION, SOLUTION, CONCENTRATE INTRAVENOUS at 06:29

## 2019-01-01 RX ADMIN — MULTIVITAMIN 15 ML: LIQUID ORAL at 08:09

## 2019-01-01 RX ADMIN — Medication 1 PACKET: at 18:12

## 2019-01-01 RX ADMIN — DEXTROSE MONOHYDRATE 25 ML: 500 INJECTION PARENTERAL at 17:28

## 2019-01-01 RX ADMIN — AMIODARONE HYDROCHLORIDE 100 MG: 200 TABLET ORAL at 14:38

## 2019-01-01 RX ADMIN — HEPARIN SODIUM 5000 UNITS: 5000 INJECTION, SOLUTION INTRAVENOUS; SUBCUTANEOUS at 05:45

## 2019-01-01 RX ADMIN — PROPOFOL 35 MCG/KG/MIN: 10 INJECTION, EMULSION INTRAVENOUS at 23:34

## 2019-01-01 RX ADMIN — NYSTATIN 500000 UNITS: 100000 SUSPENSION ORAL at 17:45

## 2019-01-01 RX ADMIN — WATER 20 ML: 1 INJECTION INTRAMUSCULAR; INTRAVENOUS; SUBCUTANEOUS at 05:26

## 2019-01-01 RX ADMIN — HEPARIN SODIUM 5000 UNITS: 5000 INJECTION, SOLUTION INTRAVENOUS; SUBCUTANEOUS at 12:39

## 2019-01-01 RX ADMIN — POTASSIUM CHLORIDE 20 MEQ: 1.5 POWDER, FOR SOLUTION ORAL at 06:51

## 2019-01-01 RX ADMIN — SODIUM CHLORIDE 2 UNITS/HR: 9 INJECTION, SOLUTION INTRAVENOUS at 02:55

## 2019-01-01 RX ADMIN — IPRATROPIUM BROMIDE AND ALBUTEROL SULFATE 3 ML: .5; 3 SOLUTION RESPIRATORY (INHALATION) at 15:45

## 2019-01-01 RX ADMIN — FUROSEMIDE 40 MG: 10 INJECTION, SOLUTION INTRAVENOUS at 18:20

## 2019-01-01 ASSESSMENT — ACTIVITIES OF DAILY LIVING (ADL)
ADLS_ACUITY_SCORE: 20
ADLS_ACUITY_SCORE: 19
ADLS_ACUITY_SCORE: 20
ADLS_ACUITY_SCORE: 20
ADLS_ACUITY_SCORE: 19
ADLS_ACUITY_SCORE: 15
ADLS_ACUITY_SCORE: 22
ADLS_ACUITY_SCORE: 19
ADLS_ACUITY_SCORE: 19
ADLS_ACUITY_SCORE: 22
ADLS_ACUITY_SCORE: 22
ADLS_ACUITY_SCORE: 19
ADLS_ACUITY_SCORE: 19
ADLS_ACUITY_SCORE: 20
ADLS_ACUITY_SCORE: 19
ADLS_ACUITY_SCORE: 22
ADLS_ACUITY_SCORE: 19
ADLS_ACUITY_SCORE: 22
ADLS_ACUITY_SCORE: 20
ADLS_ACUITY_SCORE: 22
ADLS_ACUITY_SCORE: 19
ADLS_ACUITY_SCORE: 22
ADLS_ACUITY_SCORE: 19
ADLS_ACUITY_SCORE: 22
ADLS_ACUITY_SCORE: 19
ADLS_ACUITY_SCORE: 20
ADLS_ACUITY_SCORE: 24
ADLS_ACUITY_SCORE: 22
ADLS_ACUITY_SCORE: 22
ADLS_ACUITY_SCORE: 19
ADLS_ACUITY_SCORE: 22
ADLS_ACUITY_SCORE: 22
ADLS_ACUITY_SCORE: 19
ADLS_ACUITY_SCORE: 19

## 2019-01-01 ASSESSMENT — MIFFLIN-ST. JEOR
SCORE: 2495.06
SCORE: 2494.61
SCORE: 2373.05
SCORE: 2404.8

## 2019-01-01 ASSESSMENT — ENCOUNTER SYMPTOMS
COLOR CHANGE: 1
SHORTNESS OF BREATH: 1

## 2019-07-26 NOTE — PROGRESS NOTES
"Clinic Care Coordination Contact  Outreach Summary    Data: Writer talked with patient on 7/24/19 about concerns regarding patient's . Patient told writer that she (pt) is very stressed re: her 's chemical abuse and feels that she (pt) needs some mental health support for herself. Writer provided patient with information regarding [facility] ([phone]).    Today, patient left two voice message for writer stating that she (pt) had called [facility] but was given other numbers to call, which she does not feel were helpful.    Writer attempted to call patient this afternoon--received a recording that \"This mailbox is full and can not accept messages.\"    Plan: Writer will plan to try to call patient again next week.        LYNDON De La Cruz  [facility] Care Coordination  Clinics: [facility] and [facility]   Email: [email or web address]  Tele: [phone]            "

## 2019-08-08 NOTE — PROGRESS NOTES
"Clinic Care Coordination Contact    Follow Up Progress Note      Assessment: Writer called patient for follow up    Intervention/Education provided during outreach: Provided information to patient regarding ketamine for use with alcohol abuse. (Told patient that writer had talked with [facility] staff who said that the [facility] system does not use ketamine at this time, and that it is has typically only been used with severely depressed individuals who have not responded to other treatments.)    Patient said that her(patient's)  is \"not doing well\", and patient is stressed. Writer started talking with patient about patient's call to [facility] which patient had not found helpful(per previous voice message). Patient then said to writer: \"Don't worry about me. Thank you.\" and hung up.    Plan: Patient has writer's contact information if patient wishes to discuss community resource in the future. [facility] will plan no further outreach at this time.      LYNDON De La Cruz  [facility] Care Coordination  Clinics: [facility] and [facility]   Email: [email or web address]  Tele: [phone]            "

## 2019-11-28 PROBLEM — A41.9 SEPSIS (H): Status: ACTIVE | Noted: 2019-01-01

## 2019-11-28 NOTE — ED TRIAGE NOTES
Pt presents by EMS with respiratory distress - EMS was called to the scene for hypoxia and SOB, pt's face was cyanotic and room air saturations 66%. Pt given 1 duoneb by EMS. Pt remains cyanotic. Pt did have syncopal episode for EMS prior to arrival. Pt placed on bipap on arrival. Pt has significant bilateral pedal edema

## 2019-11-28 NOTE — ED NOTES
DATE:  11/28/2019   TIME OF RECEIPT FROM LAB:  1700  LAB TEST:  JIAN curry  LAB VALUE:  5.7  RESULTS GIVEN WITH READ-BACK TO (PROVIDER):  Guillermo Schuler MD  TIME LAB VALUE REPORTED TO PROVIDER:   5:03 PM

## 2019-11-28 NOTE — ED PROVIDER NOTES
History     Chief Complaint:  Shortness of Breath      The history is provided by the spouse. The history is limited by the condition of the patient.      Hanna Valle is a 59 year old female with a history of DVT, asthma, hypertension, hyperlipidemia, diabetes type 2, alcoholism, COPD, diastolic heart failure, and cardiomyopathy who presents to the emergency department via EMS with her  for evaluation of shortness of breath. The patient's  reports that he and the patient have had a cold with a cough over the couple of weeks. He also notes that her legs have been progressively discoloring over the past week as well, and the patient started taking her 's Lasix prescription a few weeks ago in an effort to help with her leg swelling that has been worsening over the last few months. However, three hours ago her lips and nose began turning blue and she became short of breath with weakness so EMS was called. On their arrival her oxygen was 55-60% and she was given nebulizations.     On further chart review, the patient was admitted to the hospital twice in November 2016 for acute on chronic respiratory failure with CAP and sepsis. An echo at that time revealed a dilated right ventricle with reduced ejection fracture, suggestive of pulmonary hypertension.    Allergies:  Clindamycin  Lisinopril      Medications:    Albuterol  Norvasc  Celexa  Robitussin  Hydrocortisone  Imodium  Micatin  Macrobid  Prilosec  Desitin      Past Medical History:    Depression  DVT  Gastric ulcer with perforation  Hypertension  Asthma  Obesity  Diabetes type 2  Pulmonary hypertension   Alcoholism  CAP  Severe malnutrition  Pneumatosis of intestines  Lactic acidosis  Cardiomyopathy  Acute on chronic hypoxic respiratory failure   Sepsis   COPD  Diastolic heart failure   Hyperlipidemia   Morbid obesity     Past Surgical History:    Splenectomy   Colectomy  EGD  Right ankle and wrist fracture repair  I&D  Resect small bowel  resection  Salpingo-oophorectomy  Tracheostomy     Family History:    Heart disease: mother  Depression  Alcohol abuse  Suicide     Social History:  Tobacco Use: 1 ppd since age 20  Alcohol Use: Yes  PCP: No primary care provider on file.  Marital Status:       Review of Systems   Unable to perform ROS: Acuity of condition   Respiratory: Positive for shortness of breath.    Skin: Positive for color change.         Physical Exam     Patient Vitals for the past 24 hrs:   BP Temp Temp src Pulse Heart Rate Resp SpO2 Weight   11/28/19 1545 -- -- -- -- -- -- 97 % --   11/28/19 1505 -- -- -- -- 67 21 97 % --   11/28/19 1500 94/46 -- -- 64 65 15 98 % --   11/28/19 1455 94/44 -- -- 64 64 14 99 % --   11/28/19 1450 (!) 86/57 -- -- 64 65 24 99 % --   11/28/19 1445 (!) 88/39 -- -- 64 64 8 99 % --   11/28/19 1440 (!) 88/50 -- -- 64 66 16 100 % --   11/28/19 1435 (!) 67/28 -- -- 72 65 9 95 % --   11/28/19 1430 (!) 76/39 -- -- 65 65 18 99 % --   11/28/19 1425 (!) 86/67 -- -- 65 64 17 96 % --   11/28/19 1423 -- -- -- -- -- -- 95 % --   11/28/19 1420 107/70 -- -- 65 67 21 97 % --   11/28/19 1410 107/64 -- -- 67 67 20 97 % --   11/28/19 1406 (!) 146/112 -- -- 68 -- -- -- --   11/28/19 1405 -- -- -- -- 71 (!) 36 97 % --   11/28/19 1404 -- 97.1  F (36.2  C) Temporal 73 73 20 93 % (!) 171.8 kg (378 lb 12 oz)       Physical Exam    General: Resting uncomfortably on the gurney    Markedly elevated BMI    Cyanosis to the lips, fingertips, and legs bilaterally    Marked tachypnea   Head:  The scalp, face, and head appear normal  Eyes:  The pupils are equal, round, and reactive to light    There is no nystagmus    Extraocular muscles are intact    Conjunctivae and sclerae are normal  ENT:    The nose is normal    Pinnae are normal    The oropharynx is normal    Uvula is in the midline  Neck:  Normal range of motion    There is no rigidity noted    There is no midline cervical spine pain/tenderness    Trachea is in the midline    No  mass is detected  CV:  Regular rate and underlying rhythm     Normal S1/S2, no S3/S4    No pathological murmur detected  Resp:  Lungs reveal marked expiratory wheezing    A prolongation of expiratory phase    There is mild tachypnea    Mildly labored    No rales  GI:  Abdomen is soft, there is no rigidity    Marked obesity, prior surgical scars    No distension    No tympani    No rebound tenderness     Non-surgical without peritoneal features  MS:  Normal muscular tone    Symmetric motor strength    No major joint effusions    Bilateral, symmetric swelling to the knees, cyanotic appearance to both legs with   coolness to touch bilaterally and poor perfusion   Skin:  Leg discoloration as noted above  Neuro: Speech is normal and fluent  Psych:  Awake. Alert.      Normal affect.  Appropriate interactions.  Lymph: No anterior cervical lymphadenopathy noted    Emergency Department Course   ECG:  @ 1403  Indication: Shortness of breath  Vent. Rate 70 bpm. NJ interval 208 ms. QRS duration 90 ms. QT/QTc 376/406 ms. P-R-T axis 48 96 41.   Normal sinus rhythm. Possible left atrial enlargement. Rightward axis. Cannot rule out anteroseptal infarct, age undetermined. Abnormal ECG.    Read @ 1403 by Dr. Schuler.    Imaging:  Chest X-Ray. Port 1 View:   IMPRESSION: Focal airspace opacity seen within the right lower lung  concerning for pneumonia. Bilateral pleural effusions, right greater  than left. Left retrocardiac opacity is also seen which may indicate  layering effusion, focal consolidation or subsegmental atelectasis.  Enlarged cardiac mediastinal silhouette. Tortuous descending thoracic  aorta. Multilevel degenerative changes in the spine.  Reading per radiology.     Chest CT with IV Contrast:   IMPRESSION:    1.  Moderate right-sided pleural effusion.  2.  Scattered nodular airspace opacities with associated tree-in-bud  nodularity in the bilateral lungs concerning for multifocal  bronchopneumonia. Recommend short-term  interval follow-up CT scan to  ensure resolution and correlate with clinical symptoms.  3.  Cardiomegaly.  4.  Enlarged pulmonary trunk which can be seen in pulmonary  hypertension.  5.  Diffuse anasarca throughout the soft tissues.  Report per radiology.      Radiographic findings were communicated with the patient who voiced understanding of the findings.    Laboratory:  CBC: WBC: 7.5, HGB: 17.8 (H), PLT: 149 (L)  CMP: Potassium: 7.1 (H), Urea nitrogen: 43 (H), Creatinine: 2.36 (H), GFR: 22 (L), Albumin: 3.2 (L), ALKPHOS: 163 (H), AST: 126 (H), o/w WNL     BNP: 75,047 (H)    ABO/Rh type and screen: B positive, Antibody Screen Negative.      1419 Troponin POCT: 0.24 (H)    Phosphorus: 9.4 (H)    Magnesium: 2.1    Lipase: 86    D dimer quantitative: Pending    Influenza A/B antigen: Pending    1421 ISTAT electrolytes POCT: Sodium: 139, Potassium: 5.9 (H), Hemoglobin: 20.4 (H), Hematocrit: 60 (H)    1417 ISTAT gases lactate venous POCT: pH: 7.02 (L), PCO2: 119 (H), PO2: 18 (L), Bicarb: 30 (H), O2 Sat: 12, Lactic acid: 5.4 (H)  1457 ISTAT gases lactate venous POCT: pH: 7.07 (L), PCO2: 106 (H), PO2: 38, Bicarb: 31 (H), O2 Sat: 48, Lactic acid: 3.9 (H)    Creatinine POCT: Creatinine: 2.5 (H), GFR: 20 (L)    1406 Blood culture: In process  1439 Blood culture: In process    Procedures:  None        Interventions:  1409 Solu-Medrol 125 mg IV  1428 Proventil 10 mg/hr nebulization  1428 Albuterol-Ipratropium inhalation solution, 2.5 mg-0.5 mg/3 ml; 3 mL, inhalation  1440 Atrovent 0.02% nebulization  1442 Magnesium sulfate 2 g IV  1443 0.9% Sodium Chloride BOLUS 1000 mLs IV   1517 Lactated Ringers BOLUS 2000 mLs IV   1504 Zithromax 500 mg IV  1545 Albuterol-Ipratropium inhalation solution, 2.5 mg-0.5 mg/3 ml; 3 mL, inhalation  1548 Sodium bicarbonate 50 mEq IV  1615 LR 2000 ml bolus, pressure bag  4:39 PM 1 liter LR      Medications   albuterol (PROVENTIL) continous nebulization (10 mg/hr Nebulization New Bag 11/28/19 1428)    ipratropium - albuterol 0.5 mg/2.5 mg/3 mL (DUONEB) neb solution 3 mL (3 mLs Nebulization Given 11/28/19 1545)   lactated ringers BOLUS 1,000 mL (has no administration in time range)   magnesium sulfate 2 g in water intermittent infusion (0 g Intravenous Stopped 11/28/19 1442)   methylPREDNISolone sodium succinate (solu-MEDROL) injection 125 mg (125 mg Intravenous Given 11/28/19 1409)   ipratropium (ATROVENT) 0.02 % neb solution ( Nebulization Given 11/28/19 1440)   ipratropium - albuterol 0.5 mg/2.5 mg/3 mL (DUONEB) 0.5-2.5 (3) MG/3ML neb solution (3 mLs  Given 11/28/19 1428)   0.9% sodium chloride BOLUS (0 mLs Intravenous Stopped 11/28/19 1513)   lactated ringers BOLUS 2,000 mL (0 mLs Intravenous Stopped 11/28/19 1635)   azithromycin (ZITHROMAX) 500 mg vial to attach to  mL bag (500 mg Intravenous Given 11/28/19 1504)   sodium bicarbonate 8.4 % injection 50 mEq (50 mEq Intravenous Given 11/28/19 1548)       Emergency Department Course:  1353 Nursing notes and vitals reviewed. I performed an exam of the patient as documented above.     EKG was done, interpretation as above.    IV inserted. Medicine administered as documented above. Blood drawn. This was sent to the lab for further testing, results above.    The patient was sent for a chest XR and chest CT while in the emergency department, findings above.     1520 I rechecked the patient and discussed the results of her workup thus far. Patient has dramatically improved and is talking to me comfortably.     1527  I consulted with Dr. Quintanilla of the hospitalist services. He recommends I consult with the intensivist.     1532 I consulted with Dr. Galloway of the ICU. He agrees to consult the patient but requests that the hospitalist admits.    1542 I consulted with Dr. Quintanilla who will admit the patient. Dr. Galloway will consult.     1547 I rechecked the patient.     1555 Dr. Quintanilla saw the patient at bedside.     Findings and plan explained to the Patient and spouse  who consents to admission. Discussed the patient with Dr. Quintanilla, who will admit the patient to an ICU bed for further monitoring, evaluation, and treatment.    Impression & Plan    CMS Diagnoses:   The patient has signs of Septic Shock as evidenced by:  1. Presence of Sepsis, AND  2. Lactic Acid level greater than or equal to 4    Time septic shock diagnosis confirmed = 1417  11/28/19   as this was the time when Lactate was resulted and the level was greater than or equal to 4    3 Hour Septic Shock Bundle Completion:  1. Initial Lactic Acid Result:   Recent Labs   Lab Test 11/28/19  1634 11/28/19  1455 11/28/19  1403   LACT 3.9* 3.9* 5.4*       A repeat lactate has been ordered for 1630.  It resulted at 3.9 additional IV fluid is ordered  2. Blood Cultures before Antibiotics: Yes  3. Broad Spectrum Antibiotics Administered:  yes     Anti-infectives (From admission through now)    Start     Dose/Rate Route Frequency Ordered Stop    11/28/19 1500  azithromycin (ZITHROMAX) 500 mg vial to attach to  mL bag      500 mg  over 1 Hours Intravenous ONCE 11/28/19 1459          4. IF patient is in septic shock within 6 hours of time zero, as defined by:  -Initial HypoTN:  2 low BP readings (SBP <90, MAP <65, or decrease > 40 from baseline due to infection) within 3 hrs of each other during the time period of 6hrs before and 6 hrs  after time zero  -Lactate > or = 4,  THEN:  4L of IV fluid ordered and delivered based on ideal body weight  Weight: 171 kg   Patient height not recorded     (pt must be at least 60 inches tall to calculate IBW)     Body mass index is 52.82 kg/m .    Septic Shock reassessment:  1. Repeat Lactic Acid Level: 3.9  2. MAP>65 after initial IVF bolus, will continue to monitor fluid status and vital signs    Repeat Lactic Acid Level: Pending.    I attest to having performed a repeat sepsis exam and assessment of perfusion at 1605 and the results demonstrate improved perfusion. (98% oxygen  saturations, speaking in full sentences, cyanotic extremities are resolving)       Medical Decision Making:  This patient presents with acute dyspnea.  She has had a recent cough, as has her .  She is also had recent leg swelling and has been taking her 's furosemide.  The patient has a history of congestive heart failure, COPD, and pulmonary hypertension.  She was admitted 3 years ago with respiratory failure and sepsis.    Patient was immediately started on BiPAP.  This was significantly helpful.  Her portable chest x-ray revealed pleural effusion and pneumonia.  She is not a great candidate for a CT scan with IV contrast at this juncture given her acute kidney injury.  We will start off by treating her pneumonia and respiratory failure.  Patient has no history of PE and the last time she presented in this fashion her CT with IV dye showed no PE.  Empiric low molecular weight heparin could be used to cover the patient while she is being treated for pneumonia and septic shock.  She had some soft blood pressures initially, but these did improve significantly with IV hydration.  She is significantly dry and does have evidence of acute renal insufficiency.  Patient was given 3 L of crystalloid prior to consideration of vasopressors.  Her blood pressures normalized.  Her lactate is coming down slowly with IV hydration and her perfusion is improving.  She is speaking in full sentences on BiPAP.  She does not appear to be at imminent risk for intubation at this juncture.  Patient is covered with intravenous Zosyn and IV azithromycin to cover atypical pathogens.    Patient's potassium was initially high secondary to acute renal failure and significant metabolic acidosis as well.  There is a respiratory acidosis as well.  With IV hydration and an improved perfusion and getting the patient to make urine her potassium was drifting down.  She was covered empirically with bicarbonate transiently.  She was treated  for COPD exacerbation.  Patient will be placed in the intensive care unit for close monitoring overnight.  She meets criteria for septic shock but also has significant intravascular volume depletion.    Critical Care time:  was 95 minutes for this patient excluding procedures.    Diagnosis:    ICD-10-CM    1. Acute respiratory failure with hypercapnia (H) J96.02 Influenza A/B antigen   2. Hypoxia R09.02    3. Acute renal failure, unspecified acute renal failure type (H) N17.9    4. Septic shock (H) A41.9     R65.21    5. Hyperkalemia E87.5    6. Pulmonary hypertension (H) I27.20    7. Pleural effusion J90    8. Pneumonia due to infectious organism, unspecified laterality, unspecified part of lung J18.9        Disposition:  Admitted to Dr. Quintanilla in the ICU    Scribe Disclosure:  Waqar BROWN, am serving as a scribe on 11/28/2019 at 1:53 PM to personally document services performed by Guillermo Hyman MD based on my observations and the provider's statements to me.     Waqar Dia  11/28/2019    EMERGENCY DEPARTMENT       Guillermo Schuler MD  11/28/19 2278

## 2019-11-28 NOTE — H&P
[facility]    History and Physical  Hospitalist       Date of Admission:  11/28/2019    Assessment & Plan     58 years old female past medical history of morbid obesity, COPD not on home oxygen, actively smoking, diastolic heart failure, hypertension, CKD, depression, who presented with acute on chronic hypoxic hypercarbic respiratory failure with severe sepsis secondary to pneumonia, + influnza and acute kidney injury on BiPAP.    1.  Neurology:  Patient currently awake alert oriented x3 at home she has history of depression holding medication until improved mental status    2.  Cardiovascular:  -Hypotension secondary to septic shock  -Patient presented with septic shock and hypotension likely secondary to pneumonia  -Continue with antibiotics as in infectious section continue with fluid resuscitation may consider start the pressor plan to keep map above 65  -At Baseline she has history of hypertension holding home medication amlodipine    -Pulmonary hypertension per chart likely secondary to morbid obesity and COPD at home not on oxygen currently in acute failure    -Elevated troponin: at admission 0.24 likely secondary to demand ischemia with septic shock and acute hypoxic failure we will trend the troponin get cardiology consultation,Echo , EKG no acute ST-T wave changes.    -History of diastolic heart failure:  At this time we do not think patient an exacerbation in her hypoxic hypercarbic respiratory failure secondary to COPD and pneumonia she was taking Lasix from her  at home without prescription and she presented with hypotension and ORLY will hold on further diuresis actually we are giving IV fluid at this time echo was ordered and cardiology was consulted        3.  Respiratory:  -Acute hypoxic hypercarbic respiratory failure  Patient presented with acute on chronic hypoxic hypercarbic respiratory failure with pH 7.02 CO2 more than 100 currently on BiPAP likely in setting of a  pneumonia  Treating pneumonia+ influnza per  infectious section  Continue nebulizer with albuterol and ipratropium  Continue with BiPAP and recheck VBG every 4 hours  Patient may need intubation if no improvement  We will get intensivist consultation  Oxygen saturation goal 88-92  At home she is not on oxygen  Start methylprednisone 40 3 times daily      -Moderate right-sided pleural effusion likely secondary to above consider thoracentesis after stabilization    -Possibly underlying sleep apnea and obesity hypoventilation syndrome    4.  Infectious:    Septic shock secondary to community-acquired pneumonia  patient presented with septic shock secondary to a pneumonia  Presentation lactic acid 5.4 improved to 3.9 with hydration  CT scan and chest x-ray confirmed pneumonia  We will continue with Zosyn/ azithromycin  and add vancomycin  Check urine analysis at this time patient not making urine  May need pressor  She did received sepsis protocol with IV fluid resuscitation and cultures/ Abx  in the ED    influenza + : start Tamiflu / droplet isolation     Possibly fungal infection in the groin start nystatin powder    5.  Renal/Electrolytes /acid - base: patient has history of CKD creatinine around baseline 1.8 presented with creatinine 2.5 with hyperkalemia    -ORLY/CKD likely secondary to septic shock also patient was taking her  Lasix without a prescription because of bilateral leg swelling thinking she had might have CHF which is likely caused to more dehydration  Cont with IV fluid hydration  Strict input and output Grey catheter in    -For hyperkalemia patient received Ca gluconate  in ED potassium at admission 7 improved to 5.9 continue monitoring and will recheck BMP later tonight      -Acute respiratory acidosis uncompensated likely secondary to COPD exacerbation    6.  Endocrine history of morbid obesity weight is 171 kg she denies history of diabetes we will start sliding scale insulin    7.  GI  :  -GI ppx : Pepcid   -Diet keep n.p.o.    8.  Hematology/Onc:    -DVT prophylaxis: Heparin subcutaneous  -Polycythemia : likely secondary polycythemia secondary to COPD and chronic hypoxia , pt is active smoker .    IV access peripheral lines may need central line if no improvement in blood pressure      DVT Prophylaxis: Heparin SQ  Code Status: Full Code      Dangelo Quintanilla MD    Primary Care Physician   No primary care provider on file.    Chief Complaint   Hypoxia     History is obtained from the patient/      History of Present Illness      58 years old female past medical history of morbid obesity, COPD not on home oxygen, actively smoking, diastolic heart failure, hypertension, CKD, depression, who presented with acute on chronic hypoxic hypercarbic respiratory failure with sepsis secondary to pneumonia and acute kidney injury on BiPAP.  The patient's  reports that he and the patient have had a cold with a cough over the couple of weeks. He also notes that her legs have been progressively discoloring over the past week as well, and the patient started taking her 's Lasix prescription a few weeks ago in an effort to help with her leg swelling that has been worsening over the last few months. However, three hours ago her lips and nose began turning blue and she became short of breath with weakness so EMS was called. On their arrival her oxygen was 55-60% and she was given nebulizations.   At presentation she found to have severe hypoxia 60% ABG shows severe hypercarbia with CO2 more than 100 with pH 7.02 also found to be hypotensive with ORLY creatinine 2.5 hyperkalemia 7 chest x-ray and CT show a pneumonia.     On further chart review, the patient was admitted to the hospital twice in November 2016 for acute on chronic respiratory failure with CAP and sepsis. An echo at that time revealed a dilated right ventricle with reduced ejection fracture, suggestive of pulmonary  hypertension.      Past Medical History    Past Medical History:   Diagnosis Date     Depression, major 9/19/13     DVT of upper extremity (deep vein thrombosis) (H) 9/19/13     Gastric ulcer with perforation (H) 8/1/13     HTN (hypertension)      Intermittent asthma      Obesity      S/P splenectomy 7/29/13    due to intraabdominal abscess     Seasonal allergies      Severe malnutrition (H) 9/19/13    sever malnutrition after massive intraabdominal catastrophe     Type 2 diabetes, HbA1c goal < 7% (H) 10/19/10    A1C 8.1       Past Surgical History   Past Surgical History     Prior to Admission Medications   Prior to Admission Medications   Prescriptions Last Dose Informant Patient Reported? Taking?   Acetaminophen (TYLENOL PO)  Self Yes No   Sig: Take 500 mg by mouth every 6 hours as needed for mild pain or fever    Omeprazole (PRILOSEC PO)  Self Yes No   Sig: Take 20 mg by mouth every morning   albuterol (2.5 MG/3ML) 0.083% nebulizer solution  Self Yes No   Sig: Take 1 vial by nebulization every 6 hours as needed for shortness of breath / dyspnea or wheezing   albuterol (PROAIR HFA, PROVENTIL HFA, VENTOLIN HFA) 108 (90 BASE) MCG/ACT inhaler   No No   Sig: Inhale 2 puffs into the lungs every 4 hours as needed for shortness of breath / dyspnea or wheezing   amLODIPine (NORVASC) 10 MG tablet   No No   Sig: Take 1 tablet (10 mg) by mouth daily   citalopram (CELEXA) 20 MG tablet   No No   Sig: Take 1 tablet (20 mg) by mouth daily   folic acid (FOLVITE) 1 MG tablet   No No   Sig: Take 1 tablet (1 mg) by mouth daily   guaiFENesin-dextromethorphan (ROBITUSSIN DM) 100-10 MG/5ML syrup  Self No No   Sig: Take 10 mLs by mouth every 4 hours as needed for cough   hydrocortisone (PREPARATION H HYDROCORTISONE) 1 % cream   No No   Sig: Apply topically 2 times daily   loperamide (IMODIUM A-D) 2 MG tablet   No No   Sig: Take 2 tabs (4 mg) after first loose stool, and then take one tab (2 mg) after each diarrheal stool.  Max of 8  tabs (16 mg) per day.   miconazole (MICATIN; MICRO GUARD) 2 % powder   No No   Sig: Apply topically every hour as needed for other (topical candidiasis)   multivitamin, therapeutic with minerals (THERA-VIT-M) TABS   No No   Sig: Take 1 tablet by mouth daily   nitroFURantoin, macrocrystal-monohydrate, (MACROBID) 100 MG capsule   No No   Sig: Take 1 capsule (100 mg) by mouth 2 times daily   order for DME  Self No No   Sig: Equipment being ordered: Other: nebulizer machine   Treatment Diagnosis:Acute on chronic respiratory failure   order for DME   No No   Sig: Equipment being ordered: Walker Wheels () and Walker ()  Treatment Diagnosis: difficulty in gait   thiamine 100 MG tablet   No No   Sig: Take 1 tablet (100 mg) by mouth daily For 3 days   zinc oxide (DESITIN) 40 % ointment   No No   Sig: Apply topically as needed for dry skin, irritation or skin protection      Facility-Administered Medications: None     Allergies   Allergies   Allergen Reactions     Clindamycin      Gi upset, itching     Lisinopril Swelling     Angioedema of tongue, see ER note 5/9/10       Social History   Hanna Valle  reports that she has been smoking. She has been smoking about 1.00 pack per day. She does not have any smokeless tobacco history on file. She reports current alcohol use. She reports that she does not use drugs.    Family History   Hanna Valle family history includes Heart Disease in her mother; Psychotic Disorder in her father. She was adopted.    Review of Systems   The 10 point Review of Systems is negative other than noted in the HPI or here.     Physical Exam   Temp: 97.1  F (36.2  C) Temp src: Temporal BP: 94/46 Pulse: 64 Heart Rate: 67 Resp: 21 SpO2: 97 % O2 Device: BiPAP/CPAP    Vital Signs with Ranges  Temp:  [97.1  F (36.2  C)] 97.1  F (36.2  C)  Pulse:  [64-73] 64  Heart Rate:  [64-73] 67  Resp:  [8-36] 21  BP: ()/() 94/46  FiO2 (%):  [100 %] 100 %  SpO2:  [93 %-100 %] 97 %  378 lbs 12  oz    Constitutional: Mild distress awake alert   eyes:  conjunctiva red and pupils examined and normal.  HEENT: dry mucosa  Respiratory: Poor air entry bilaterally with diffuse wheezing   cardiovascular: ,normal S1 and S2,  GI: Soft, non-distended, non-tender, normal bowel sounds.  Skin: Rash in the suprapubic area  Musculoskeletal: chronic Skin changes with bilateral edema  Neurologic: AXOX3 no focal       Data   Data reviewed today:  I personally reviewed no images or EKG's today.  Recent Labs   Lab 11/28/19  1523 11/28/19  1421 11/28/19  1419 11/28/19  1409 11/28/19  1406   WBC  --   --   --   --  7.5   HGB 19.4* 20.4*  --  20.7* 17.8*   MCV  --   --   --   --  112*   PLT  --   --   --   --  149*    139  --  139 138   POTASSIUM 5.9* 5.9*  --  7.0* 7.1*   CHLORIDE  --   --   --   --  102   CO2  --   --   --   --  31   BUN  --   --   --   --  43*   CR  --   --   --   --  2.36*   ANIONGAP  --   --   --   --  5   ANIBAL  --   --   --   --  9.3   GLC  --   --   --   --  78   ALBUMIN  --   --   --   --  3.2*   PROTTOTAL  --   --   --   --  7.1   BILITOTAL  --   --   --   --  0.9   ALKPHOS  --   --   --   --  163*   ALT  --   --   --   --  42   AST  --   --   --   --  126*   LIPASE  --   --   --   --  86   TROPONIN  --   --  0.24*  --   --        Imaging:  Recent Results (from the past 24 hour(s))   XR Chest Port 1 View    Narrative    CHEST PORTABLE ONE VIEW November 28, 2019 2:19 PM     HISTORY: Shortness of breath, hypoxia.    COMPARISON: 11/21/2016.      Impression    IMPRESSION: Focal airspace opacity seen within the right lower lung  concerning for pneumonia. Bilateral pleural effusions, right greater  than left. Left retrocardiac opacity is also seen which may indicate  layering effusion, focal consolidation or subsegmental atelectasis.  Enlarged cardiac mediastinal silhouette. Tortuous descending thoracic  aorta. Multilevel degenerative changes in the spine.    YULI MELENDREZ MD   Chest CT w/o contrast     Narrative    CT CHEST WITHOUT CONTRAST  11/28/2019 3:42 PM    HISTORY:  Hypoxia.  COPD.  Possible lower lobe infiltrates.  Evaluate  pneumonia.    TECHNIQUE:  Scans obtained from the apices through the diaphragm  without IV contrast. Radiation dose for this scan was reduced using  automated exposure control, adjustment of the mA and/or kV according  to patient size, or iterative reconstruction technique.    COMPARISON:  None.    FINDINGS:    Lungs: Moderate right-sided pleural effusion is present. Scattered  nodular airspace opacities are seen in the lungs bilaterally with  areas of tree-in-bud nodularity also noted.    Additional findings: The visualized portions of the thyroid gland  demonstrate a 7 mm hypodense nodule in the right lobe which is  unchanged. No supraclavicular or axillary lymphadenopathy is seen.  Several mildly prominent mediastinal lymph nodes are noted, measuring  up to 1.2 cm in the left upper paratracheal space and 1.2 cm in the  precarinal region. Heart size is enlarged. No pericardial effusion is  seen. Pulmonary trunk is enlarged measuring 4.0 cm in diameter.  Vascular calcifications seen in the thoracic aorta. Mild left adrenal  gland thickening appears unchanged. Diffuse anasarca throughout the  soft tissues. Multilevel degenerative changes are present in the  spine.      Impression    IMPRESSION:    1.  Moderate right-sided pleural effusion.  2.  Scattered nodular airspace opacities with associated tree-in-bud  nodularity in the bilateral lungs concerning for multifocal  bronchopneumonia. Recommend short-term interval follow-up CT scan to  ensure resolution and correlate with clinical symptoms.  3.  Cardiomegaly.  4.  Enlarged pulmonary trunk which can be seen in pulmonary  hypertension.  5.  Diffuse anasarca throughout the soft tissues.    YULI MELENDREZ MD

## 2019-11-28 NOTE — CONSULTS
[facility]   CRITICAL CARE ADMISSION/CONSULTATION    Hanna Valle MRN: [id number]  1960  Date of Admission:11/28/2019          HPI   Hanna Valle IS a 59 year old female admitted on 11/28/2019 with significant history for COPD, morbid obesity, HFpEF, HTN, CKD, MDD who presents with progressive dyspnea 2/2 PNA in admitted to ICU for management.   - Had URI sx over the past few weeks and noted worsening LE edema. She has been taking her husbands lasix for the swelling however without improvement. EMS was called because her lips and mouth were turning blue.   - In ED, O2 sat was 55-60% and improved with NIPPV and duonebs. She had exp wheezing on exam. Sig labs include pH 7.0, PCO2 120, SCr 2.5 (baseline <1) and positive influenza A screen. Ct chest demonstrates bilateral ggo and right pleural effusion.   - Has COPD and possible underlying obesity hypoventilation. She has been hospitalized prior for respiratory distress. Currently only uses albuterol at home.   - She was sent to ICU for management of these issues.            Past Medical History:      Past Medical History:   Diagnosis Date     Depression, major 9/19/13     DVT of upper extremity (deep vein thrombosis) (H) 9/19/13     Gastric ulcer with perforation (H) 8/1/13     HTN (hypertension)      Intermittent asthma      Obesity      S/P splenectomy 7/29/13    due to intraabdominal abscess     Seasonal allergies      Severe malnutrition (H) 9/19/13    sever malnutrition after massive intraabdominal catastrophe     Type 2 diabetes, HbA1c goal < 7% (H) 10/19/10    A1C 8.1             Past Surgical History:      Past Surgical History:   Procedure Laterality Date     COLECTOMY WITHOUT COLOSTOMY  8/8/2013    Procedure: COLECTOMY WITHOUT COLOSTOMY;  SMALL BOWEL ANASTOMOSIS, ABDOMINAL WASHOUT, GASTROJEJUNOSTOMY TUBE PLACEMENT, ABDOMINAL CLOSURE, ABDOMINAL WALL DEBRIDEMENT-REMOVED 25 CM; WOUND VAC PLACEMENT. ;  Surgeon: Dai Infante MD;  Location:  [facility] OR     ESOPHAGOSCOPY, GASTROSCOPY, DUODENOSCOPY (EGD), COMBINED  9/12/2013    Procedure: COMBINED ESOPHAGOSCOPY, GASTROSCOPY, DUODENOSCOPY (EGD);  gastroscopy;  Surgeon: Faizan Lundberg MD;  Location: [facility] GI     FRACTURE TX, ANKLE RT/LT  11/04    right     FRACTURE TX, WRIST RT/LT  1/07    right     INCISION AND DRAINAGE ABDOMEN WASHOUT, COMBINED  8/1/2013    Procedure: COMBINED INCISION AND DRAINAGE ABDOMEN WASHOUT;  REOPENING OF ABDOMINAL INCISION, SMALL BOWEL RESECTION GREATER THAN 100CM, ABDOMINAL WASHOUT WITH NEW WOUND VAC PLACEMENT (Dr. Infante)  LEFT OOPHORECTOMY (DR. GARNER);  Surgeon: Dai Infante MD;  Location:  OR     INCISION AND DRAINAGE ABDOMEN WASHOUT, COMBINED  8/3/2013    Procedure: COMBINED INCISION AND DRAINAGE ABDOMEN WASHOUT;  ABDOMINAL WASHOUT ;  Surgeon: Al Vargas MD;  Location:  OR     INSERT TUBE GASTROJEJUNOSTOMY  8/8/2013    Procedure: INSERT TUBE GASTROJEJUNOSTOMY (OPEN);;  Surgeon: Dai Infante MD;  Location:  OR     IRRIGATION AND DEBRIDEMENT ABDOMEN WASHOUT, COMBINED  8/6/2013    Procedure: COMBINED IRRIGATION AND DEBRIDEMENT ABDOMEN WASHOUT;  ABDOMEN WASHOUT;  Surgeon: Dai Infante MD;  Location:  OR     LAPAROTOMY EXPLORATORY  7/29/2013    Procedure: LAPAROTOMY EXPLORATORY;  exploratory laparotomy, drainage of intraabdominal abcess, splenectomy, vac dressing.;  Surgeon: Dai Infante MD;  Location:  OR     LAPAROTOMY EXPLORATORY  8/9/2013    Procedure: LAPAROTOMY EXPLORATORY;  Exploratory Laparotomy, Repair of Gastric Perforation, Wound Vac Dressing Exchange;  Surgeon: Guillermo Whyte MD;  Location:  OR     LAPAROTOMY EXPLORATORY  8/16/2013    Procedure: LAPAROTOMY EXPLORATORY;  EXPLORATORY LAPAROTOMY, REMOVAL OF JEJUNOSTOMY TUBE, AND REPAIR OF JEJUNAL PERFORATION;  Surgeon: Robbie Sears MD;  Location:  OR     RESECT SMALL BOWEL WITHOUT OSTOMY  8/1/2013    Procedure: RESECT SMALL BOWEL WITHOUT OSTOMY;;  Surgeon: Dai Infante,  MD;  Location: [facility] OR     SALPINGO-OOPHORECTOMY, COMBINED  8/1/2013    Procedure: COMBINED SALPINGO-OOPHORECTOMY;;  Surgeon: Tatyana Fleming MD;  Location:  OR     SPLENECTOMY  7/29/2013    Procedure: SPLENECTOMY;;  Surgeon: Dai Infante MD;  Location:  OR     TRACHEOSTOMY  8/30/2013    Procedure: TRACHEOSTOMY;  TRACHEOSTOMY;  Surgeon: Jason Trujillo MD;  Location:  OR            Social History:     Social History     Tobacco Use     Smoking status: Current Every Day Smoker     Packs/day: 1.00     Tobacco comment: 4 cigs daily   Substance Use Topics     Alcohol use: Yes     Comment: 4 drinks weekly            Family History:     Family History   Adopted: Yes   Problem Relation Age of Onset     Heart Disease Mother         rheumatic fever, mitral valve disease     Psychotic Disorder Father         depression, alcohol abuse, suicide at age 67             Allergies:   Please see allergy list which was reviewed this admission.         Medications:       famotidine  20 mg Intravenous Q24H     heparin ANTICOAGULANT  5,000 Units Subcutaneous Q8H     insulin aspart  1-4 Units Subcutaneous Q4H     methylPREDNISolone  40 mg Intravenous Q8H     nystatin  500,000 Units Swish & Spit 4x Daily     piperacillin-tazobactam  2.25 g Intravenous Q6H     vancomycin (VANCOCIN) IV  2,500 mg Intravenous Once     vancomycin place huff - receiving intermittent dosing  1 each Intravenous See Admin Instructions     acetaminophen, albuterol, glucose **OR** dextrose **OR** glucagon, HYDROmorphone, naloxone, ondansetron **OR** ondansetron, prochlorperazine **OR** prochlorperazine **OR** prochlorperazine, senna-docusate **OR** senna-docusate         Review of Systems:   CONSTITUTIONAL: negative for fever, chills, change in weight  INTEGUMENTARY/SKIN: no rash or obvious new lesions  ENT/MOUTH: no sore throat, new sinus pain or nasal drainage  RESP: see interval history  CV: negative for chest pain, palpitations or  peripheral edema  GI: no nausea, vomiting, change in stools  : no dysuria  MUSCULOSKELETAL: no myalgias, arthralgias  ENDOCRINE: blood sugars with adequate control  PSYCHIATRIC: mood stable  LYMPHATIC: no new lymphadenopathy  HEME: no bleeding or easy bruisability  NEURO: no numbness, weakness, headaches         Physical Examination:   Temp:  [97.1  F (36.2  C)] 97.1  F (36.2  C)  Pulse:  [64-80] 66  Heart Rate:  [64-73] 66  Resp:  [8-36] 23  BP: ()/() 107/71  FiO2 (%):  [100 %] 100 %  SpO2:  [86 %-100 %] 99 %    Intake/Output Summary (Last 24 hours) at 11/28/2019 1739  Last data filed at 11/28/2019 1635  Gross per 24 hour   Intake 3000 ml   Output --   Net 3000 ml     Wt Readings from Last 4 Encounters:   11/28/19 (!) 171.8 kg (378 lb 12 oz)   09/17/18 136.1 kg (300 lb)   11/25/16 (!) 142.4 kg (314 lb)   10/31/16 (!) 146.1 kg (322 lb)     BP - Mean:  [] 91  FiO2 (%): 100 %  Resp: 23    No lab results found in last 7 days.    GEN: no acute distress   HEENT: head ncat, sclera anicteric, OP patent, trachea midline   PULM: decreased BS at bases with scattered exp wheeze  CV/COR: RRR S1S2 no gallop,  No rub, no murmur  ABD: soft nontender, hypoactive bowel sounds, no mass  EXT:  2-3+ pitting edema   NEURO: grossly intact  SKIN: no obvious rash      Assessment and plan :       Pulmonary/Renal/Electrolyte/ID/Endocrine/Hematology/Oncology  1. Acute on chronic hypercapnic/hypoxic respiratory failure, ORLY, HFpEF in setting of bilobar pneumonia, positive influenza. Appears to be tolerating BIPAP comfortably at the bedside. She is mentating appropriately. Previous VBG's were peripheral and will get ABG's to confirm. The major issue is influenza infection and superinfection with possible bacterial pneumonia. Will initiate rocephin, azithro and tamiflu. Ok with steroids. ORLY is likely prerenal and will cont IVF. Will get TTE in am to evaluate cardiac function   2. ICU glucose protocol    GI/Nutrition:   1.  Keep NPO  2. PPI for ppx     Endocrine/Hematology/Oncology:   1. Erythrocytosis. Likely multifactorial including dehydration and possible chronic hypoxemia.     Disposition/Code Status/Other  1. Critically ill with resp distress.   2. Code: Full     ICU Prophylaxis:   1. DVT: Hep Subq/mechanical  2. VAP: HOB 30 degrees, chlorhexidine rinse  3. Stress Ulcer: PPI  4. Restraints: Nonviolent soft two point restraints required and necessary for patient safety and continued cares and good effect as patient continues to pull at necessary lines, tubes despite education and distraction. Will readdress daily.   5. IV Access - central access required and necessary for continued patient cares  6. Feeding - NPO    I have personally reviewed the daily labs, imaging studies, cultures and discussed the case with referring physician and consulting physicians.     This patient is critically ill and I have provided 30 minutes of critical care time (excluding procedures) on November 28, 2019.     Edward Galloway MD   of Medicine  Interventional Pulmonary  Department of Pulmonary, Allergy, Critical Care and Sleep Medicine   [facility]  Pager: [phone]   Office: [phone]  Email: [email or web address]    ROUTINE ICU LABS (Last four results)  CMP  Recent Labs   Lab 11/28/19  1659 11/28/19  1523 11/28/19  1421 11/28/19  1414 11/28/19  1409 11/28/19  1406    139 139  --  139 138   POTASSIUM 5.3 5.9* 5.9*  --  7.0* 7.1*   CHLORIDE  --   --   --   --   --  102   CO2  --   --   --   --   --  31   ANIONGAP  --   --   --   --   --  5   GLC  --   --   --   --   --  78   BUN  --   --   --   --   --  43*   CR  --   --   --   --   --  2.36*   GFRESTIMATED  --   --   --  20*  --  22*   GFRESTBLACK  --   --   --  24*  --  26*   ANIBAL  --   --   --   --   --  9.3   MAG  --   --   --   --   --  2.1   PHOS  --   --   --   --   --  9.4*   PROTTOTAL  --   --   --   --   --  7.1   ALBUMIN  --   --   --   --    --  3.2*   BILITOTAL  --   --   --   --   --  0.9   ALKPHOS  --   --   --   --   --  163*   AST  --   --   --   --   --  126*   ALT  --   --   --   --   --  42     CBC  Recent Labs   Lab 11/28/19  1659 11/28/19  1523 11/28/19  1421 11/28/19  1409 11/28/19  1406   WBC  --   --   --   --  7.5   RBC  --   --   --   --  5.38*   HGB 19.4* 19.4* 20.4* 20.7* 17.8*   HCT  --   --   --   --  60.1*   MCV  --   --   --   --  112*   MCH  --   --   --   --  33.1*   MCHC  --   --   --   --  29.6*   RDW  --   --   --   --  15.3*   PLT  --   --   --   --  149*     INR  Recent Labs   Lab 11/28/19  1406   INR 1.40*     Arterial Blood GasNo lab results found in last 7 days.    All cultures:  No results for input(s): CULT in the last 168 hours.  Recent Results (from the past 24 hour(s))   XR Chest Port 1 View    Narrative    CHEST PORTABLE ONE VIEW November 28, 2019 2:19 PM     HISTORY: Shortness of breath, hypoxia.    COMPARISON: 11/21/2016.      Impression    IMPRESSION: Focal airspace opacity seen within the right lower lung  concerning for pneumonia. Bilateral pleural effusions, right greater  than left. Left retrocardiac opacity is also seen which may indicate  layering effusion, focal consolidation or subsegmental atelectasis.  Enlarged cardiac mediastinal silhouette. Tortuous descending thoracic  aorta. Multilevel degenerative changes in the spine.    YULI MELENDREZ MD   Chest CT w/o contrast    Narrative    CT CHEST WITHOUT CONTRAST  11/28/2019 3:42 PM    HISTORY:  Hypoxia.  COPD.  Possible lower lobe infiltrates.  Evaluate  pneumonia.    TECHNIQUE:  Scans obtained from the apices through the diaphragm  without IV contrast. Radiation dose for this scan was reduced using  automated exposure control, adjustment of the mA and/or kV according  to patient size, or iterative reconstruction technique.    COMPARISON:  None.    FINDINGS:    Lungs: Moderate right-sided pleural effusion is present. Scattered  nodular airspace opacities  are seen in the lungs bilaterally with  areas of tree-in-bud nodularity also noted.    Additional findings: The visualized portions of the thyroid gland  demonstrate a 7 mm hypodense nodule in the right lobe which is  unchanged. No supraclavicular or axillary lymphadenopathy is seen.  Several mildly prominent mediastinal lymph nodes are noted, measuring  up to 1.2 cm in the left upper paratracheal space and 1.2 cm in the  precarinal region. Heart size is enlarged. No pericardial effusion is  seen. Pulmonary trunk is enlarged measuring 4.0 cm in diameter.  Vascular calcifications seen in the thoracic aorta. Mild left adrenal  gland thickening appears unchanged. Diffuse anasarca throughout the  soft tissues. Multilevel degenerative changes are present in the  spine.      Impression    IMPRESSION:    1.  Moderate right-sided pleural effusion.  2.  Scattered nodular airspace opacities with associated tree-in-bud  nodularity in the bilateral lungs concerning for multifocal  bronchopneumonia. Recommend short-term interval follow-up CT scan to  ensure resolution and correlate with clinical symptoms.  3.  Cardiomegaly.  4.  Enlarged pulmonary trunk which can be seen in pulmonary  hypertension.  5.  Diffuse anasarca throughout the soft tissues.    YULI MELENDREZ MD              Lines, Drains, Airway     .  Peripheral IV 11/28/19 Left (Active)   Number of days: 0       Peripheral IV 11/28/19 Right (Active)   Number of days: 0       PICC Triple Lumen 09/06/13 Left Brachial vein medial access (Active)   Number of days: 2274       Closed/Suction Drain 1 Right RUQ Bulb 15 [place] (Active)   Number of days: 2303       Negative Pressure Wound Therapy Abdomen (Active)   Number of days: 2303       Gastrostomy/Enterostomy Gastrojejunostomy LUQ 2 16 fr balloon: 3-5 mL (Active)   Number of days: 2303       Urethral Catheter Straight-tip 16 fr (Active)   Number of days: 0       Pressure Ulcer 08/15/13 Right Buttocks Scabbing, appears old  (Active)   Number of days: 2296       Wound 08/19/13 Left Ear black, scab (Active)   Number of days: 2292       Wound 11/24/16 Posterior Coccyx red, excoriated, bleeding, nonblanchable (Active)   Number of days: 1099       Incision/Surgical Site 07/29/13 Midline Abdomen (Active)   Number of days: 2313

## 2019-11-28 NOTE — ED NOTES
MD updated on repeat potassium level and istat troponin 0.24. MD also updated that pt's blood pressures trending down. Pt reports feeling dizzy and has been feeling dizzy for months

## 2019-11-28 NOTE — ED NOTES
DATE:  11/28/2019   TIME OF RECEIPT FROM LAB:  3:11 PM    LAB TEST:  Potassium   LAB VALUE:  7.1   RESULTS GIVEN WITH READ-BACK TO (PROVIDER):  Guillermo Schuler MD  TIME LAB VALUE REPORTED TO PROVIDER:   3:11 PM

## 2019-11-29 NOTE — PHARMACY-ADMISSION MEDICATION HISTORY
Pharmacy Medication History  Admission medication history interview status for the 11/28/2019  admission is complete. See EPIC admission navigator for prior to admission medications     Medication history sources: Patient  Medication history source reliability: Moderate  Adherence assessment: Poor    Significant changes made to the medication list:  Removed all medications except acetaminophen, cetirizine, and omeprazole    Additional medication history information:   Pt denies taking any prescription medications. Called [facility], no fill history since 2018.     Medication reconciliation completed by provider prior to medication history? No    Time spent in this activity: 15 minutes      Prior to Admission medications    Medication Sig Last Dose Taking? Auth Provider   Acetaminophen (TYLENOL PO) Take 500 mg by mouth every 6 hours as needed for mild pain or fever  PRN at PRN Yes Unknown, Entered By History   cetirizine (ZYRTEC) 10 MG tablet Take 10 mg by mouth daily  Yes Unknown, Entered By History   Omeprazole (PRILOSEC PO) Take 20 mg by mouth every morning  Yes Unknown, Entered By History   order for DME Equipment being ordered: Walker Wheels () and Walker ()  Treatment Diagnosis: difficulty in gait   Yael Hart MD   order for DME Equipment being ordered: Other: nebulizer machine   Treatment Diagnosis:Acute on chronic respiratory failure   Sapna Berg MD

## 2019-11-29 NOTE — PROGRESS NOTES
"[facility] Nurse Inpatient Wound Assessment   Reason for consultation: Evaluate and treat skin folds, scabbing extremities      Assessment  Skin folds to groin/pannus area with moderate skin breakdown, consisting mostly of bright red petechial scabs and erosions.  Related to a combination of moisture, friction, pressure from folds, incontinence, and possible fungal rash.  Pt morbidly obese with very deep, extensive skin folds.  Under breasts with similar but milder breakdown.  Coccyx/gluteal cleft not assessed today, pt had just been repositioned, but per Nursing the coccyx area is intact without obvious pressure injury and there is just mild moisture irritation along folds.      Extremities with scattered small dry red scabs, unclear exact etiology.  No acute s/s infection to most areas; left dorsal foot is somewhat red and puffy, BLE red and edematous.      Skin issues seem in large part related to lack of adequate care/hygiene.  Feet are stained with dirt and have very long curled ingrown toenails.      Treatment Plan  Skin folds to pannus/groin/breasts/etc:  BID and prn:  1.  Cleanse thoroughly with mild skin cleanser, dry well.  Ensure getting to base of folds - may need 2 people.    2.  Apply InterDry into base of folds.  Keep the fabric in 1-2 FLAT layers; do not bunch up.  Use big enough pieces to allow 2-3\" of fabric to hang out of the fold. (This will allow the moisture to wick out and evaporate; if the fabric is lightly damp, then it is working).  Replace when soiled.    3.  If minimal improvement over next couple of days, consider adding antifungal powder to any of the rashy-looking areas and folds as well (and/or to any areas where the InterDry does not stay or contact well), at least for a few days.  Rub the powder in well, so that there is just an even, transparent layer everywhere.    4.  Keep HOB as low as tolerated, to help minimize pressure in the folds and to maximize air " flow.  PIP measures.      Extremities:  No special wound cares needed at this time, scabs seem dry and stable - notify WOC if any changes or concerns.      Pressure Injury Prevention (PIP) Plan:  If patient is declining pressure injury prevention interventions: Explore reason why and address patient's concerns and Educate on pressure injury risk and prevention intervention(s)  Mattress: Follow bed algorithm, reassess daily and order specialty mattress, if indicated.  HOB: Maintain at or below 30 degrees, unless contraindicated  Repositioning in bed: Every 1-2 hours , Left/right positioning; avoid supine and Raise foot of bed prior to raising head of bed, to reduce patient sliding down (shear); frequent microturns  Heels: Keep elevated off mattress  Protective Dressing: None  Positioning Equipment: None  Chair positioning: Assist patient to reposition hourly   If patient has a buttock pressure injury, or high risk for PI use chair cushion or SPS.  Moisture Management: Perineal cleansing /protection: Follow Incontinence Protocol, Avoid brief in bed, Clean and dry skin folds with bathing  and Consider InterDry (#245002) between folds  Under Devices: Inspect skin under all medical devices during skin inspection , Ensure tubes are stabilized without tension and Ensure patient is not lying on medical devices or equipment when repositioned  Ask provider to discontinue device when no longer needed.      Orders Written  WOC Nurse follow-up plan: weekly  Nursing to notify the Provider(s) and re-consult the WOC Nurse if wound(s) deteriorates or new skin concern.    Patient History  According to provider note(s):  58 years old female past medical history of morbid obesity, COPD not on home oxygen, actively smoking, diastolic heart failure, hypertension, CKD, depression, who presented with acute on chronic hypoxic hypercarbic respiratory failure with severe sepsis secondary to pneumonia, + influnza and acute kidney injury on  BiPAP.    Objective Data  Containment of urine/stool: Incontinence Protocol and Indwelling catheter    Active Diet Order:  Orders Placed This Encounter      NPO for Medical/Clinical Reasons Except for: Meds, Ice Chips    Output:   I/O last 3 completed shifts:  In: 6525.15 [I.V.:1525.15; IV Piggyback:5000]  Out: 435 [Urine:435]    Risk Assessment:   Sensory Perception: 3-->slightly limited  Moisture: 2-->very moist  Activity: 1-->bedfast  Mobility: 2-->very limited  Nutrition: 3-->adequate  Friction and Shear: 1-->problem  Torres Score: 12                          Labs:   Recent Labs   Lab 11/29/19  0450  11/28/19  1746  11/28/19  1406   ALBUMIN 2.2*   < >  --   --  3.2*   HGB 15.6   < >  --    < > 17.8*   INR  --   --   --   --  1.40*   WBC 8.1   < >  --   --  7.5   A1C  --   --  6.8*  --   --     < > = values in this interval not displayed.       Physical Exam  Skin inspection: focused skin folds, extremities    Wound Location:  Groin folds  Date of last photo:  11-29-19      Wound History: pt very obese with deep skin folds and reported loose stools  Measurements (length x width x depth, in cm):  Extends approx 5-10cm out from base of folds  Wound Base: red eroded semi-moist tissue, purplish scabbing at outer aspects  Palpation of the wound bed: normal; right breast indurated  Periwound skin: edematous, erythema- blanchable, macerated and superficial erosion  Color: pink and red  Temperature: warm  Drainage: weepy moist  Description of drainage: serous  Odor: mild  Pain: ADONIS    Wound Location:  BLE  Date of last photo:  11-29-19      Wound History: unclear; pt with apparent lack of hygiene, very long nails, dirty feet  Measurements (length x width x depth, in cm): <1cm scab to dorsal left foot, intact  Periwound skin: edema and scattered erythema to BLE  Color: pink  Temperature: warm  Drainage: none    Wound Location:  Upper extremities  Date of last photo:  11-29-19 left shoulder      Wound History: unclear, pt  may be picking at her skin  Measurements (length x width x depth, in cm): scattered small scabby spots <0.5cm    Wound Base: intact red scabs and erythema  Periwound skin: clear, intact  Drainage: none    Interventions  Current support surface: Isolibrium in ICU  Current off-loading measures: Pillows  Visual inspection of wound(s) completed  Wound Care: done per plan of care  Supplies: reviewed  Education provided: plan of care, Moisture management, Hygiene and Off-loading pressure  Discussed plan of care with Nurse    Aleksandra Terry RN

## 2019-11-29 NOTE — PROGRESS NOTES
Patient currently intubated and on mechanical ventilation, hospitalist team will sign off, however, please do not hesitate to call or contact our service with any questions or concerns. Thank you.

## 2019-11-29 NOTE — PROVIDER NOTIFICATION
MD Notification    Notified Person: MD    Notified Person Name:Nilesh    Notification Date/Time: 11/28/19 1815    Notification Interaction: face to face    Purpose of Notification: potassium 6.2  Troponin 0.309    Orders Received: continue to monitor    Comments:

## 2019-11-29 NOTE — PROGRESS NOTES
[facility] ICU RESPIRATORY NOTE  Date of Admission: 11/28/19  Date of Intubation (most recent): 11/29/19  Reason for Mechanical Ventilation: Resp. Failure  Number of Days on Mechanical Ventilation: 1  Met Criteria for Pressure Support Trial: No  Length of Pressure Support Trial:    Reason for Stopping Pressure Support Trial:  Reason for No Pressure Support Trial: Per MD     Significant Events Today: Pt intubated overnight for Hypercapneic Resp. Failure     ABG Results:   Recent Labs   Lab 11/29/19  0415 11/29/19  0100 11/28/19  2145 11/28/19  1840   PH 7.28* 7.13* 7.14* 7.13*   PCO2 65* 100* 89* 85*   PO2 99 129* 123* 130*   HCO3 30* 33* 31* 28     Ventilation Mode: CMV/AC  (Continuous Mandatory Ventilation/ Assist Control)  FiO2 (%): 100 %  Rate Set (breaths/minute): 20 breaths/min  Tidal Volume Set (mL): 500 mL  PEEP (cm H2O): 7 cmH2O  Oxygen Concentration (%): 100 %  Resp: (!) 0         ETT appearance on chest x-ray: Advanced 3 cm, now in good position      Skin Assessment: Intact     Plan:   Continue full support    Zac Portillo, RT

## 2019-11-29 NOTE — PROGRESS NOTES
[facility]  Critical Care Service  Progress Note  Date of Service (when I saw the patient): 11/29/2019  Main Plans for Today      Ventilator management, lung-protective ventilation  Advance OG by 20 cm  Sputum cx  Follow up 2D echo  Repeat Troponin  PICC   US all extremities   Nutrition consult  Will change CTX to Zosyn with urine cx >100k lactose-fermenting gram negative rods    Assessment & Plan   Hanna Valle is a 59 year old female who was admitted on 11/28/2019 with acute on chronic hypoxic hypercarbic respiratory failure in the setting of influenza a, CAP.      Neuro  1. History depression  2. History alcohol use disorder  3. Toxic metabolic encephalopathy secondary to respiratory failure, sepsis  4. ICU sedation  Plan:  -- Holding PTA anti-depressant.   -- Low threshold for CIWA use when not sedated.  -- Propofol for sedation    CV  1. Chronic diastolic CHF, EF 55-60% in 2016.    2. HTN  3. Pulmonary HTN in the setting COPD, HTN  4. Shock, multifactorial (hypovolemic, septic)  5. Tropinemia in the setting shock, ORLY  6. Normal sinus rhythm  7. Lactic acidosis secondary to sepsis, hypovolemia.  Resolved.  Plan:  --  Patient not on prescribed diuresis but was recently using 's lasix without improvement in peripheral edema.  -- Fluid resuscitation with use of pressors if needed.    -- Trend Troponins.    -- Follow up 2D echo    Resp:  1. Acute on chronic respiratory failure with hypoxia and hypercarbia requiring intubation.    2. Bilateral pleural effusions  3. CAP   4. Likely BEVERLEY secondary to habitus.    5. Influenza A  6. COPD  Plan:  -- Ventilator bundle.  /10/26/75%  -- Right sided pleural effusion stable, left side evolving on today's CXR.   -- Infectious management per ID section.    -- Solumedrol 40 Q8 IV  -- Scheduled duonebs.      GI/Nutrition  1. Morbid obesity with BMI of 53  2. Enteral access with OG.    3. Transaminitis, likely shocked liver.    Plan:  -- NPO  --  PPI  -- Trend LFTs.  US if symptomatic.      Renal  1. ORLY in the setting sepsis, hypovolemia.    2. Hyperkalemia, resolved.  3. Respiratory acidosis  Plan:  --monitor function and electrolytes as needed with replacement per ICU protocols. - generally avoid nephrotoxic agents such as NSAID, IV contrast unless specifically required  -- adjust medications as needed for renal clearance  -- follow I/O's as appropriate.  -- maintain euvolemia    ID  1. Influenza A  2. CAP  3. Likely chronic cutaneous candidal infection, groin.  Plan:  -- CT/CXR confirm pneumonia  -- Tamiflu  -- Azithromycin, Zosyn.  Add Vanco if clinically worsening.      Endocrine  1. Stress Hyperglycemia  2. DM2 with Hgb A1C of 6.8.  Plan:  --  Insulin gtt per protocol if indicated  -- Keep BG  <180 for optimal healing    Heme:  1. Elevated H/H, suspect concentrated.    Plan:  -- Monitor hemoglobin.  -- Transfuse to keep > 7.0    MSK  1. Deconditioning due to critical illnes  Plan:  -- PT and OT consult when able.     Skin  1. Candidal cutaneous infection, groin.    Plan:  -- Nystatin powder    General cares:  DVT Prophylaxis: Heparin SQ  GI Prophylaxis: PPI  Restraints: Restraints for medical healing needed: YES  Family update by me today: No     Current lines are required for patient management  Access:    ETT  OG  RUE PICC  PIV  Art line    Kenisha Barrios PA-C  Time Spent on this Encounter      Billing:  I spent 90 minutes bedside and on the inpatient unit today managing the critical care of Hanna Valle in relation to the issues listed in this note.    Interval History   Patient is a 59 year old female with history chronic diastolic CHF, COPD, history of respiratory failure, morbid obesity that was admitted 11/28 with acute respiratory failure, multifactorial.   She failed BiPAP and required intubation due to worsening mental status.      ROS not done due to patient sedation.      Physical Exam   Temp: 99  F (37.2  C) Temp src: Bladder Temp  Min:  97.1  F (36.2  C)  Max: 100.2  F (37.9  C) BP: (!) 126/98 Pulse: 71 Heart Rate: 66 Resp: 20 SpO2: 96 % O2 Device: Mechanical Ventilator    Vitals:    11/28/19 1404 11/28/19 1800 11/29/19 0600   Weight: (!) 171.8 kg (378 lb 12 oz) (!) 173.2 kg (381 lb 13.4 oz) (!) 175.1 kg (386 lb 0.4 oz)     I/O last 3 completed shifts:  In: 6525.15 [I.V.:1525.15; IV Piggyback:5000]  Out: 435 [Urine:435]    GEN: Sedated  EYES: PERRL, Anicteric sclera.   HEENT:  Normocephalic, atraumatic, trachea midline, ETT secure  CV: RRR, no gallops, rubs, or murmurs  PULM/CHEST: Clear breath sounds bilaterally without rhonchi, crackles or wheeze, symmetric chest rise  GI: normal bowel sounds, soft, non-tender, no rebound tenderness or guarding, no masses  : potts catheter in place, urine yellow and clear  EXTREMITIES:  peripheral edema, moving all extremities, peripheral pulses intact  NEURO: Cranial nerves II-XII grossly intact, no motor-sensory deficits noted  SKIN: No rashes, sores or ulcerations  PSYCH:  Affect: Sedated    Imaging personally reviewed:  ECG, CXR, CT chest    Data   Recent Labs   Lab 11/29/19  0450 11/28/19  2110 11/28/19  1840 11/28/19  1746 11/28/19  1659  11/28/19  1419  11/28/19  1406   WBC 8.1  --  8.0  --   --   --   --   --  7.5   HGB 15.6  --  16.9*  --  19.4*   < >  --    < > 17.8*   *  --  110*  --   --   --   --   --  112*     --  147* 134*  --   --   --   --  149*   INR  --   --   --   --   --   --   --   --  1.40*     --  139 137 140   < >  --    < > 138   POTASSIUM 4.9 5.2 5.4* 6.2* 5.3   < >  --    < > 7.1*   CHLORIDE 104  --  103 104  --   --   --   --  102   CO2 30  --  28 25  --   --   --   --  31   BUN 50*  --  45* 47*  --   --   --   --  43*   CR 1.97*  --  2.02* 2.16*  --   --   --   --  2.36*   ANIONGAP 4  --  8 8  --   --   --   --  5   ANIBAL 8.1*  --  8.7 8.9  --   --   --   --  9.3   *  --  128* 130*  --   --   --   --  78   ALBUMIN 2.2*  --  2.8*  --   --   --   --   --   3.2*   PROTTOTAL 5.2*  --  6.2*  --   --   --   --   --  7.1   BILITOTAL 0.3  --  0.6  --   --   --   --   --  0.9   ALKPHOS 115  --  147  --   --   --   --   --  163*   ALT 70*  --  61*  --   --   --   --   --  42   *  --  213*  --   --   --   --   --  126*   TROPI  --   --   --  0.309*  --   --   --   --   --    TROPONIN  --   --   --   --   --   --  0.24*  --   --     < > = values in this interval not displayed.

## 2019-11-29 NOTE — PROGRESS NOTES
[facility] ICU RESPIRATORY NOTE      Date of Admission: 11/28/19  Date of Intubation (most recent): 11/29/19  Reason for Mechanical Ventilation: Resp. Failure  Number of Days on Mechanical Ventilation: 1  Met Criteria for Pressure Support Trial: No  Length of Pressure Support Trial:    Reason for Stopping Pressure Support Trial:  Reason for No Pressure Support Trial: Per MD    ABG Results:   Recent Labs   Lab 11/29/19  1024 11/29/19  0415 11/29/19  0100 11/28/19  2145   PH 7.35 7.28* 7.13* 7.14*   PCO2 57* 65* 100* 89*   PO2 93 99 129* 123*   HCO3 32* 30* 33* 31*   O2PER Room Air  --   --   --        Current Vent Settings: Ventilation Mode: CMV/AC  (Continuous Mandatory Ventilation/ Assist Control)  FiO2 (%): 75 %  Rate Set (breaths/minute): 26 breaths/min  Tidal Volume Set (mL): 450 mL  PEEP (cm H2O): 10 cmH2O  Oxygen Concentration (%): 75 %  Resp: 20          Plan: Continue full ventilator support.    Gertrude Corley, RT  11/29/2019
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
## 2019-11-29 NOTE — PROVIDER NOTIFICATION
MD Notification    Notified Person: MD    Notified Person Name:  Nusrat    Notification Date/Time:  11/28/19  3493    Notification Interaction:  Face to face    Purpose of Notification:  Critical labs.  Ph 7.13, PcO2 85    Orders Received:  No    Comments:

## 2019-11-29 NOTE — PROGRESS NOTES
Pt was not as responsive as the night progressed hence she was intubated. In addition the CO2 increased in blood gases.    She had low BP post intubation while on propofol. She was given 1L bolus of NS and her urine output improved but remains low. A second bolus is going in now.    She received Kayexalate last night. Today is having diarrhoea. Hyperkalemia has resolved.   Stopped both D10 gtts and Bicarb gtts as her K is better and Acidosis has improved.    PICC line has been ordered.    Imaging today shows increase pulm vascular congestion.    Bobby Silverio MD.  Asst Professor,  Pulmonary and Critical Care.  11/29/2019  6:10 AM

## 2019-11-29 NOTE — CONSULTS
"CLINICAL NUTRITION SERVICES  -  ASSESSMENT NOTE      Recommendations Ordered by Registered Dietitian (RD): Peptamen Intense VHP at 30 mL/hr:  720 kcal, 66 g protein, 55 g CHO, 3 g fiber, 605 mL H2O  ProSource 1 pkt 7x per day:  280 kcal, 77 g protein  Total (TF + ProSource + Propofol):  1824 kcal (26 kcal/kg and 104% needs), 143 g protein (2 g/kg)  Flushes 60 mL every 4 hours  Certavite daily via FT    Malnutrition: % Weight Loss:  None noted  % Intake:  Unable to determine   Subcutaneous Fat Loss:  Unable   Muscle Loss:  Unable   Fluid Retention:  Severe 4+ in lower extremities     Malnutrition Diagnosis: Unable to determine due to lack of nutrition history and inability to perform a nutrition focused physical exam         REASON FOR ASSESSMENT  Hanna Valle is a 59 year old female seen by Registered Dietitian for Provider Order - Registered Dietitian to Assess and Order TF per Medical Nutrition Therapy Protocol      NUTRITION HISTORY  - Unable to obtain nutrition history as patient intubated and sedated.       CURRENT NUTRITION ORDERS  Diet Order:     NPO day #2    Current Intake/Tolerance:  N/A      NUTRITION FOCUSED PHYSICAL ASSESSMENT FOR DIAGNOSING MALNUTRITION)  No:  Patient not available - Bedside procedure being done in room                 Observed:    Unable    Obtained from Chart/Interdisciplinary Team:  Edema - Severe 4+ in lower extremities     ANTHROPOMETRICS  Height: 5'11\"  Weight: 171.8 kg (379#)(11/28)  Body mass index is 52.9 kg/m   Weight Status:  Obesity Grade III BMI >40  IBW: 70.5 kg   % IBW: 244%  Weight History:   Wt Readings from Last 10 Encounters:   11/29/19 (!) 175.1 kg (386 lb 0.4 oz)   09/17/18 136.1 kg (300 lb)   11/25/16 (!) 142.4 kg (314 lb)   10/31/16 (!) 146.1 kg (322 lb)   09/19/13 89.1 kg (196 lb 6.9 oz)   10/19/10 (!) 151.5 kg (334 lb)   12/30/09 (!) 151.5 kg (334 lb)   10/22/09 (!) 153.8 kg (339 lb)   06/01/09 (!) 155.6 kg (343 lb)   04/13/09 (!) 159.2 kg (351 lb)   Weight " has trended upward overall       LABS  Labs reviewed    MEDICATIONS  Propofol at 31.2 mL/hr= 824 kcal       ASSESSED NUTRITION NEEDS PER APPROVED PRACTICE GUIDELINES:    Dosing Weight 70.5 kg (IBW)  Estimated Energy Needs: 6585-1117 kcals (22-25 kcal/kg)  Justification: obese  Estimated Protein Needs: 140-175 grams protein (2-2.5 g pro/Kg)  Justification: hypercatabolism with critical illness and obesity guidelines   Estimated Fluid Needs: 1561-9371 mL (1 mL/Kcal)  Justification: maintenance    MALNUTRITION:  % Weight Loss:  None noted  % Intake:  Unable to determine   Subcutaneous Fat Loss:  Unable   Muscle Loss:  Unable   Fluid Retention:  Severe 4+ in lower extremities     Malnutrition Diagnosis: Unable to determine due to lack of nutrition history and inability to perform a nutrition focused physical exam     NUTRITION DIAGNOSIS:  Inadequate protein-energy intake related to NPO on vent, TF to start as evidenced by meeting 0% protein and 50% energy needs from Propofol       NUTRITION INTERVENTIONS  Recommendations / Nutrition Prescription  Peptamen Intense VHP at 30 mL/hr:  720 kcal, 66 g protein, 55 g CHO, 3 g fiber, 605 mL H2O  ProSource 1 pkt 7x per day:  280 kcal, 77 g protein  Total (TF + ProSource + Propofol):  1824 kcal (26 kcal/kg and 104% needs), 143 g protein (2 g/kg)  Flushes 60 mL every 4 hours  Certavite daily via FT     Implementation  Nutrition education: Not appropriate at this time due to patient condition  EN Composition, EN Schedule, Feeding Tube Flush, Medical Food Supplement, and Multivitamin/Mineral:  Orders written to begin Peptamen Intense VHP at 15 mL/hr x 24 hours and then advance to goal.  ProSource, flushes, and Certavite as above.     Nutrition Goals  Patient will meet % needs via TF + ProSource + Propofol     MONITORING AND EVALUATION:  Progress towards goals will be monitored and evaluated per protocol and Practice Guidelines    Sofia Morales RD, LD, CNSC   Clinical  Dietitian - [facility]

## 2019-11-29 NOTE — PROVIDER NOTIFICATION
Dr. Silverio asked to come to bedside because patient is hard to arouse and lethargic. Intubation ordered.

## 2019-11-29 NOTE — ANESTHESIA CARE TRANSFER NOTE
Patient: Hanna Valle    * No procedures listed *    Diagnosis: * No pre-op diagnosis entered *  Diagnosis Additional Information: No value filed.    Anesthesia Type:   No value filed.     Note:  Airway :ETT  Patient transferred to:ICU  Comments: Diagnosis: Respiratory Distress  Procedure: Intubation  Ordering Physician: MD Patricia  Location:[facility]-     Preoxygenated with 100% oxygen at 15 liters per minute via Bipap, induced with 50 mg IV propofol, Glidescope 3 used to place 7.0 mm ID endotracheal tube, Cormack-Lehane grade 1 (full view of entire glottic aperture) view, cords open (abducted), depth 24 cm at the lip, endotracheal tube position unchanged, equal, bilateral breath sounds auscultated in ICU room, patient placed on ICU ventilator by respiratory therapist, teeth and oral mucosa intact and unchanged at handoff of care. greater than 95% after intubation, clinical monitors and alarms on and functioning, report on patient's clinical status given to ICU RN, report on patient's clinical status given to intensivist,, ICU staff questions answered. See EPIC anesthesia record for vital signs recorded during anesthesia care.ICU Handoff: Call for PAUSE to initiate/utilize ICU HANDOFF, Identified Patient, Identified Responsible Provider, Reviewed the Pertinent Medical History, Discussed Surgical Course, Reviewed Intra-OP Anesthesia Management and Issues during Anesthesia, Set Expectations for Post Procedure Period and Allowed Opportunity for Questions and Acknowledgement of Understanding      Vitals: (Last set prior to Anesthesia Care Transfer)    CRNA VITALS  11/29/2019 0225 - 11/29/2019 0255      11/29/2019             NIBP:  94/68    Pulse:  82    NIBP Mean:  65    SpO2:  98 %    EKG:  Sinus rhythm                Electronically Signed By: NADIA Lester CRNA  November 29, 2019  2:55 AM

## 2019-11-30 NOTE — PROGRESS NOTES
At approximately 5:45 PM, patient developed atrial fibrillation with rapid ventricular rates in the 150-170s.  Prior to that she was in sinus rhythm/sinus tachycardia.  Blood pressure stable in the 150s/80s and not requiring inotropic support.  Still mechanically ventilated with 75% FiO2.    RECOMMENDATIONS:  1.  IV amiodarone - 150 mg bolus followed by infusion for 24 hours.  2.  5 mg IV metoprolol start and as needed.  3.  Patient has a nasogastric tube in situ.  Start metoprolol tartrate 50 mg 3 times daily and uptitrate as needed.  4.  If rate control remains challenging, add IV diltiazem drip, if blood pressure permits.  She has many systemic illnesses and it is been no surprise that she has developed atrial fibrillation.  As her general status improves, so we will rate control.    Please page cardiology as needed.    PAUL Valencia MD [facility]  Cardiology

## 2019-11-30 NOTE — PROGRESS NOTES
Late entry for 11/29/2019 0800  Patient adequately sedated with no efforts to dislodge tubes, bilateral soft wrist restraints discontinued.

## 2019-11-30 NOTE — PLAN OF CARE
Remains intubated, synchronous with the ventilator,  Modest urine output, hemodynamics adequate, Acute onset of a-fib with RVR around 545 pm.  Dr. Shaffer aware, Cardiology here as well, orders received to replace Magnesium, will give Amidoarone bolus followed by infusion.  Skin issues revealed with WOC nurse.  Interdry to all moist areas of perineum, pannus and breasts after cleansing skin, lotion to lower extremities.   Darell updated with today's events via telephone.  Hand-off to Nicol Leal RN
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
## 2019-11-30 NOTE — PLAN OF CARE
Pt intubated and sedated. Follows commands. HR remains tachy. Amio infusing. Vaso infusing. Tube feeding infusing at 15. 2 loose bm's overnight. Grey patent with adequate output. Will continue to monitor.

## 2019-11-30 NOTE — PROGRESS NOTES
Date of Admission: 11/28/19  Date of Intubation (most recent): 11/29/19  Reason for Mechanical Ventilation: Resp. Failure  Number of Days on Mechanical Ventilation: 2  Met Criteria for Pressure Support Trial: No  Length of Pressure Support Trial:    Reason for Stopping Pressure Support Trial:  Reason for No Pressure Support Trial: Per MD    Ventilation Mode: CMV/AC  (Continuous Mandatory Ventilation/ Assist Control)  FiO2 (%): 75 %  Rate Set (breaths/minute): 26 breaths/min  Tidal Volume Set (mL): 450 mL  PEEP (cm H2O): 10 cmH2O  Oxygen Concentration (%): 75 %  Resp: 26    Recent Labs   Lab 11/29/19  1825 11/29/19  1024 11/29/19  0415 11/29/19  0100   PH 7.34* 7.35 7.28* 7.13*   PCO2 60* 57* 65* 100*   PO2 99 93 99 129*   HCO3 32* 32* 30* 33*   O2PER  --  Room Air  --   --    plan:continue full vent support tonight

## 2019-11-30 NOTE — CONSULTS
Consult Date:  11/29/2019      INPATIENT CARDIOLOGY CONSULTATION       LOCATION:  [facility]      REFERRAL SOURCE:  Dr. Dangelo Quintanilla.      REASON FOR CONSULTATION:  Elevated troponin in a 59-year-old lady admitted with influenza A, sepsis, acute kidney injury, acute respiratory failure.      HISTORY OF PRESENT ILLNESS:    Hanna Valle is a 59-year-old, morbidly obese lady with a BMI of 54 kg/m2.  She is intubated and mechanically ventilated.  History could not be obtained directly from the patient.      She is known to have morbid obesity, COPD (not on home oxygen), active tobacco use, heart failure with preserved ejection fraction, pulmonary hypertension, chronic kidney disease, depression, type 2 diabetes mellitus, systemic hypertension, prior history of DVT in 2013 and gastric ulcer with perforation in 2013.  She also has multiple excoriations on her body and is significantly unkempt.  She is  and lives with her  who himself has drug addiction issues.  We are being consulted for a troponin elevation of 1.0.      Hanna was admitted with progressive respiratory distress and acute hypoxemic/hypercarbic respiratory failure requiring intubation/ventilation, acute non-oliguric renal failure (presenting creatinine 2.5) with hyperkalemia of 7.0 (managed with medical therapy).  She has tested positive for influenza A and is on droplet isolation.  She is being treated for influenza pneumonia with septic shock and is significantly acidemic, currently on 75% FiO2 on the ventilator.  She is hemodynamically stable and not requiring inotropic support.  Rhythm is sinus tachycardia.  On ECG, no significant ischemic ECG changes.  Initial troponin was 0.309, subsequent 1.08.  She is has polycythemia with a hemoglobin of 20.7 and hematocrit of 60.1 (likely secondary to chronic hypoxemia and obesity hypoventilation syndrome).      I reviewed her echocardiogram images, which are consistent with severe  right ventricular failure and pulmonary hypertension.  The right ventricle is severely dilated with severely reduced systolic function.  There is septal flattening of pulmonary hypertension.  The left ventricle is normal in size with preserved systolic function and normal wall motion.  No hemodynamically significant valve disease, but the study was suboptimal due to her body habitus and intubated status.  Ultrasound ruled out DVT.      PHYSICAL EXAMINATION:  Challenging due to morbid obesity.  Multiple excoriations on the body as documented.  It is hard to assess lower extremity edema.  She is mechanically ventilated.      DIAGNOSES/ASSESSMENT/PLAN:   1.  Acute hypoxemic hypercarbic respiratory failure, requiring mechanical support.   2.  Influenza A pneumonia.   3.  Sepsis secondary to #1.   4.  Acute kidney injury and hyperkalemia.   5.  Obesity hypoventilation syndrome.   6.  Troponin elevation, likely demand ischemia from the above.   7.  Morbid obesity with a BMI of 54 kg/m2.   8.  Failure to thrive.   9.  Type 2 diabetes.   10.  Hypertension.      Hanna is admitted with multiple acute comorbidities -- renal failure, respiratory failure, sepsis, influenza, pneumonia and very likely has severe untreated obesity hypoventilation syndrome with chronic obstructive pulmonary disease (COPD) and active tobacco use.  She also has severe pulmonary hypertension which is likely secondary to obesity hypoventilation syndrome and her respiratory insult.  She has normal left ventricular (LV) wall motion on echo and no significant ischemic changes on ECG.  The troponin elevation is likely demand ischemia or due to acute renal failure.       1.  For now, further evaluation of the troponin elevation is not indicated. She would not be a candidate for invasive procedure and contrast use anyway.   2.  She is rhythmically and hemodynamically stable.    3.  Cardiology will sign off.  Once she is adequately recovered, she can be  evaluated either as an inpatient or outpatient depending on symptom status.     ADDENDUM AT 6:00 pm, SAME DAY:  At approximately 5:45 PM, patient developed atrial fibrillation with rapid ventricular rates in the 150-170s.  Prior to that she was in sinus rhythm/sinus tachycardia.  Blood pressure stable in the 150s/80s and not requiring inotropic support.  Still mechanically ventilated with 75% FiO2.     RECOMMENDATIONS:  1.  IV amiodarone - 150 mg bolus followed by infusion for 24 hours.  2.  5 mg IV metoprolol start and as needed.  3.  Patient has a nasogastric tube in situ.  Start metoprolol tartrate 50 mg 3 times daily and uptitrate as needed.  4.  If rate control remains challenging, add IV diltiazem drip, if blood pressure permits.  She has many systemic illnesses and it is been no surprise that she has developed atrial fibrillation.  As her general status improves, so we will rate control.     Please page cardiology as needed.     Total critical care time was 50 minutes; greater than 50% was spent in counseling and coordination of care with the ICU team.         SANDRA PILLAI MD             D: 2019   T: 2019   MT:       Name:     BECKY WAHL   MRN:      [id number]        Account:       [id number]   :      1960           Consult Date:  2019      Document: [id number]

## 2019-11-30 NOTE — PROGRESS NOTES
Critical Care Progress Note        11/30/2019    Name: Hanna Valle MRN#: [id number]   Age: 59 year old YOB: 1960     Hsptl Day# 2  ICU DAY #    MV DAY #             Problem List:   Active Problems:    Sepsis (H)           Summary/Hospital Course:     11/29: Remains on moderate ventilator settings with improvement in oxygenation. More awake with lightening of sedation.       Assessment and plan :     Hanna Valle IS a 59 year old female admitted on 11/28/2019 for acute on chronic hypoxemic respiratory failure in the setting of Influenza A and community acquired pneumonia.   I have personally reviewed the daily labs, imaging studies, cultures and discussed the case with referring physician and consulting physicians.     My assessment and plan by system for this patient is as follows:    Neurology/Psychiatry:   1. History depression  2. History alcohol use disorder  3. Toxic metabolic encephalopathy secondary to respiratory failure, sepsis  4. ICU sedation    Plan:  - Holding PTA anti-depressant  - Low threshold for CIWA use when not sedated  - Propofol for sedation, lightened this AM and was following commands    Cardiovascular:   1. HFpEF (EF yesterday 50-55% on echo)  2. Pulmonary hypertension with severely dilated RV with flattened septum   3. Shock (septic)-lactate now normal      Plan   - Remains on vasopressin only  -  Significant volume overload in setting of poor RV function and elevated right sided filling pressures, will aggressively diurese today, Lasix 40 mg x 1 this AM, re-dose this PM     Pulmonary/Ventilator Management:   1. Acute on chronic hypoxemic respiratory failure  2. CAP   3. Influenza A   4. Likely BEVERLEY   5. COPD   6. Bilateral pleural effusions      Plan  - Vent settings to remain the same (/10/26/75%)  - Repeat ABG today and wean FiO2 as able   - Continue Zosyn, Azithromycin and Tamiflu  - Diurese as above  - Scheduled nebs     GI/Nutrition :   1. Morbid obesity   2.  Transaminitis: trending down and stable   3. Enteral feedings    Plan  - Tube feeds started yesterday  - PPI   - Trend LFTs as needed     Renal/Fluids/Electrolytes:   1. ORLY in setting of septic shock, improving. Cr down to 1.47 from 1.97  2. Hyperkalemia   3. Volume overload     Plan  - Diurese as above and trend UOP  - monitor function and electrolytes as needed with replacement per ICU protocols.  - generally avoid nephrotoxic agents such as NSAID, IV contrast unless specifically required  - adjust medications as needed for renal clearance  - follow I/O's as appropriate.    Infectious Disease:   1. Influenza A   2. Community acquired pneumonia   3. E. Coli UTI  4. Chronic cutaneous candida infection, groin     Plan  - Continue Azithromycin and Zosyn  - Continue Tamiflu     Endocrine:   1. Stress induced hyperglycemia  2. Hx of DMII     Plan  - ICU insulin protocol, goal sugar <180  - Switch to medium intensity sliding scale     Hematology/Oncology:   1. Leukocytosis: in setting of critical illness   2. Elevated H/H    Plan  - Continue to monitor     MSK/Rheum:  1. Deconditioning     Plan  - PT/OT when able     Skin:   1. Candidal infection    Plan:   -Nystatin cream     IV/Access:   1. Venous access: PIV and RUE PICC    2. Arterial access: art line in place    Plan  - central access required and necessary    ICU Prophylaxis:   1. DVT: Hep Subq  2. VAP: HOB 30 degrees, chlorhexidine rinse  3. Stress Ulcer: PPI  4. Restraints: Nonviolent soft two point restraints required and necessary for patient safety and continued cares and good effect as patient continues to pull at necessary lines, tubes despite education and distraction. Will readdress daily.   5. Wound care - per unit routine   6. Feeding - Tube feeds started 11/29  7. Family Update: will update  this PM by phone   8. Disposition - ICU    Key goals for next 24 hours:   1. Check ABG, wean FiO2 as able   2. Lasix 40 mg IV this AM, will likely re-dose  this PM  3. Reduce metoprolol dose         Interim History/Overnight Events:     No major events overnight. Remains on moderate vent settings. Awoke to voice on exam this AM.           Key Medications:       azithromycin  500 mg Intravenous Q24H     chlorhexidine  15 mL Mouth/Throat Q12H     furosemide  40 mg Intravenous Q8H     heparin ANTICOAGULANT  5,000 Units Subcutaneous Q8H     insulin aspart  1-4 Units Subcutaneous Q4H     ipratropium - albuterol 0.5 mg/2.5 mg/3 mL  3 mL Nebulization Q4H While awake     methylPREDNISolone  40 mg Intravenous Q8H     metoprolol tartrate  50 mg Per NG tube BID     multivitamins w/minerals  15 mL Per Feeding Tube Daily     nystatin  500,000 Units Swish & Spit 4x Daily     oseltamivir  150 mg Oral or Feeding Tube BID     pantoprazole  40 mg Oral Daily    Or     pantoprazole  40 mg Oral or Feeding Tube Daily     piperacillin-tazobactam  3.375 g Intravenous Q6H     protein modular  1 packet Per Feeding Tube 7x Daily     sodium chloride (PF)  10 mL Intracatheter Q8H       amiodarone 0.5 mg/min (11/30/19 0704)     IV fluid REPLACEMENT ONLY       diltiazem (CARDIZEM) infusion ADULT       propofol (DIPRIVAN) infusion 30 mcg/kg/min (11/30/19 0915)     sodium chloride 10 mL/hr at 11/30/19 0704     vasopressin (PITRESSIN) infusion ADULT (40 mL) 2.4 Units/hr (11/30/19 0916)               Physical Examination:   Temp:  [97.1  F (36.2  C)-98.6  F (37  C)] 97.5  F (36.4  C)  Heart Rate:  [] 105  Resp:  [9-33] 12  BP: (100-179)/(44-97) 111/96  MAP:  [59 mmHg-94 mmHg] 92 mmHg  Arterial Line BP: ()/(45-78) 121/76  FiO2 (%):  [75 %] 75 %  SpO2:  [90 %-100 %] 97 %    Intake/Output Summary (Last 24 hours) at 11/30/2019 1105  Last data filed at 11/30/2019 0800  Gross per 24 hour   Intake 1909.15 ml   Output 1130 ml   Net 779.15 ml     Wt Readings from Last 4 Encounters:   11/30/19 (!) 174.6 kg (385 lb)   09/17/18 136.1 kg (300 lb)   11/25/16 (!) 142.4 kg (314 lb)   10/31/16 (!) 146.1 kg  (322 lb)     Arterial Line BP: ()/(45-78) 121/76  MAP:  [59 mmHg-94 mmHg] 92 mmHg  BP - Mean:  [] 101  Ventilation Mode: CMV/AC  (Continuous Mandatory Ventilation/ Assist Control)  FiO2 (%): 75 %  Rate Set (breaths/minute): 26 breaths/min  Tidal Volume Set (mL): 450 mL  PEEP (cm H2O): 10 cmH2O  Oxygen Concentration (%): 75 %  Resp: 12    Recent Labs   Lab 11/29/19  1825 11/29/19  1024 11/29/19  0415 11/29/19  0100   PH 7.34* 7.35 7.28* 7.13*   PCO2 60* 57* 65* 100*   PO2 99 93 99 129*   HCO3 32* 32* 30* 33*   O2PER  --  Room Air  --   --        General:   Intubated and sedated    Neurologic:   Sedation: lightly sedated and awakable   HEENT:   Head is atraumatic      Lungs:   Symmetrical chest shape and movements with each tidal breath  Abnormal lung auscultation: rhonchi in upper lung fields    Cardiovascular:   Regular rate and rhythm  Normal S1,S2, and no gallop, rub or murmur   Abdomen:   Distended:  No.   Bowel sound present and normal: Yes .   Soft: Yes . Tender: No.   Rebound:tendeness or guarding present No   Extremities:   Clubbing present: No,   Edema present: Yes ,   Acrocyanosis present: No,   Mottling present: No,   Normal capillary refill: Yes    Skin:   Warm, dry.  No rash on limited exam.    Lines/Drain:    Central line present: Yes   Arterial line present: Yes   External ventricular Drain present: No  Chest tube present: No  LUCY present: No  PEG present: No            Data:   All data and imaging reviewed.     ROUTINE ICU LABS (Last four results)  CMP  Recent Labs   Lab 11/30/19  0525 11/29/19  0450 11/28/19  2110 11/28/19  1840 11/28/19  1746  11/28/19  1406    138  --  139 137   < > 138   POTASSIUM 5.5* 4.9 5.2 5.4* 6.2*   < > 7.1*   CHLORIDE 101 104  --  103 104  --  102   CO2 31 30  --  28 25  --  31   ANIONGAP 4 4  --  8 8  --  5   * 224*  --  128* 130*  --  78   BUN 52* 50*  --  45* 47*  --  43*   CR 1.47* 1.97*  --  2.02* 2.16*  --  2.36*   GFRESTIMATED 39* 27*  --  27*  24*   < > 22*   GFRESTBLACK 45* 32*  --  31* 28*   < > 26*   ANIBAL 8.5 8.1*  --  8.7 8.9  --  9.3   MAG  --   --   --   --   --   --  2.1   PHOS  --   --   --   --   --   --  9.4*   PROTTOTAL 5.5* 5.2*  --  6.2*  --   --  7.1   ALBUMIN 2.1* 2.2*  --  2.8*  --   --  3.2*   BILITOTAL 0.6 0.3  --  0.6  --   --  0.9   ALKPHOS 113 115  --  147  --   --  163*   * 256*  --  213*  --   --  126*   ALT 82* 70*  --  61*  --   --  42    < > = values in this interval not displayed.     CBC  Recent Labs   Lab 11/30/19  0525 11/29/19  0450 11/28/19  1840 11/28/19  1746 11/28/19  1659  11/28/19  1406   WBC 11.4* 8.1 8.0  --   --   --  7.5   RBC 5.13 4.87 5.20  --   --   --  5.38*   HGB 16.5* 15.6 16.9*  --  19.4*   < > 17.8*   HCT 52.9* 53.2* 57.4*  --   --   --  60.1*   * 109* 110*  --   --   --  112*   MCH 32.2 32.0 32.5  --   --   --  33.1*   MCHC 31.2* 29.3* 29.4*  --   --   --  29.6*   RDW 15.3* 15.4* 15.4*  --   --   --  15.3*    162 147* 134*  --   --  149*    < > = values in this interval not displayed.     INR  Recent Labs   Lab 11/28/19  1406   INR 1.40*     Arterial Blood Gas  Recent Labs   Lab 11/29/19  1825 11/29/19  1024 11/29/19  0415 11/29/19  0100   PH 7.34* 7.35 7.28* 7.13*   PCO2 60* 57* 65* 100*   PO2 99 93 99 129*   HCO3 32* 32* 30* 33*   O2PER  --  Room Air  --   --        All cultures:  Recent Labs   Lab 11/29/19  1553 11/28/19  1612 11/28/19  1439 11/28/19  1406   CULT PENDING >100,000 colonies/mL  Escherichia coli  Susceptibility testing in progress  *  50,000 to 100,000 colonies/mL  Strain 2  Lactose fermenting gram negative rods  Susceptibility testing in progress  *  10,000 to 50,000 colonies/mL  Strain 3  Escherichia coli  Susceptibility testing in progress  * No growth after 2 days No growth after 2 days     Recent Results (from the past 24 hour(s))   US Upper Extremity Venous Duplex Bilateral Port    Narrative    PROCEDURE:  Venous Doppler ultrasound of the bilateral upper  extremities    DATE OF PROCEDURE:  11/29/2019 1:13 PM    CLINICAL HISTORY/INDICATION:   59-year-old female with prolonged immobility, in the ICU with upper  extremity swelling. For DVT.    COMPARISON:   9/5/2013    TECHNIQUE:   Grayscale, color-flow, and spectral waveform analysis were performed  of the deep veins of the bilateral upper extremities    FINDINGS:   Limited examination due to patient body habitus, portable technique  and ICU setting.  The right jugular vein demonstrates normal compressibility, color-flow  and spectral waveform.    The right subclavian vein, axillary vein, brachial vein and basilic  vein demonstrate normal compressibility, spectral waveform, color flow  and augmentation.    Short segment right nonocclusive cephalic vein thrombus in the  antecubital fossa    The left jugular vein demonstrates normal compressibility, color-flow  and spectral waveform.    The left subclavian vein, axillary vein, brachial vein and basilic  vein demonstrate normal compressibility, spectral waveform, color flow  and augmentation.    Short segment left cephalic vein thrombus in the distal humerus.      Impression    IMPRESSION:   1.  Short segment nonocclusive right cephalic vein thrombus.  2.  Short segment left cephalic vein thrombus.    BENJA TURCIOS MD   US Lower Extremity Venous Duplex Bilateral    Narrative    PROCEDURE:  Venous Doppler ultrasound of the bilateral lower extremities    DATE OF PROCEDURE:  11/29/2019 1:13 PM    CLINICAL HISTORY/INDICATION:   59 year-old ICU patient with prolonged immobility enlarged ovaries  swelling. Evaluate for DVT.    COMPARISON:   None relevant    TECHNIQUE:   Grayscale, color-flow, and spectral waveform analysis were performed  of the deep veins of the bilateral lower extremities    FINDINGS:   Very limited exam due to patient body habitus, portable technique and  swelling. The right common femoral vein was not seen the tibial veins  on the left are not seen in the  distal superficial femoral vein on the  left was not seen all due to above-mentioned factors.    The right superficial femoral vein and popliteal vein demonstrate  normal compressibility, spectral waveform, color flow and  augmentation.    The left common femoral vein, mid and proximal superficial femoral  vein and popliteal vein demonstrate normal compressibility, spectral  waveform, color flow and augmentation.    The right posterior tibial vein, peroneal vein, and greater saphenous  vein are compressible.      Impression    IMPRESSION:   1. No evidence of deep venous thrombosis in the visualized right lower  extremity  2. No evidence of deep venous thrombosis in the visualized left lower  extremity    BENJA TURCIOS MD                 Billing: This patient is critically ill: Yes. Total critical care time today 35 min. This does not include time spent on procedures or teaching.     Aly Amador MD  Pulmonary & Critical Care Medicine  [facility]   Pager: [phone]

## 2019-11-30 NOTE — PROGRESS NOTES
Pending sale to [facility] ICU RESPIRATORY NOTE        Date of Admission: 11/28/2019    Date of Intubation (most recent): 11/29/19    Reason for Mechanical Ventilation: Resp. failure    Number of Days on Mechanical Ventilation: 2    Met Criteria for Spontaneous Breathing Trial: No    Reason for No Spontaneous Breathing Trial: No per MD      ABG Results:   Recent Labs   Lab 11/30/19  1650 11/29/19  1825 11/29/19  1024 11/29/19  0415   PH 7.37 7.34* 7.35 7.28*   PCO2 57* 60* 57* 65*   PO2 80 99 93 99   HCO3 32* 32* 32* 30*   O2PER  --   --  Room Air  --        Current Vent Settings: Ventilation Mode: CMV/AC  (Continuous Mandatory Ventilation/ Assist Control)  FiO2 (%): 75 %  Rate Set (breaths/minute): 26 breaths/min  Tidal Volume Set (mL): 450 mL  PEEP (cm H2O): 10 cmH2O  Oxygen Concentration (%): 75 %  Resp: (!) 31        Plan: Will continue full ventilatory support for now and assess for weaning readiness daily.         Leeann Fam RT,  11/30/2019 5:54 PM

## 2019-11-30 NOTE — PROGRESS NOTES
[facility]    Cardiology Progress Note     Assessment & Plan   DIAGNOSES/ASSESSMENT/PLAN:   1.  Acute hypoxemic hypercarbic respiratory failure, requiring mechanical support.   2.  Influenza A pneumonia.   3.  Sepsis secondary to #1.   4.  Acute kidney injury and hyperkalemia.   5.  Obesity hypoventilation syndrome.   6.  Troponin elevation, likely demand ischemia from the above.   7.  Morbid obesity with a BMI of 54 kg/m2.   8.  Failure to thrive.   9.  Type 2 diabetes.   10.  Hypertension.      Hanna is admitted with multiple acute comorbidities -- renal failure, respiratory failure, sepsis, influenza, pneumonia and very likely has severe untreated obesity hypoventilation syndrome with chronic obstructive pulmonary disease (COPD) and active tobacco use.  She also has severe pulmonary hypertension which is likely secondary to obesity hypoventilation syndrome and her respiratory insult.  She has normal left ventricular (LV) wall motion on echo and no significant ischemic changes on ECG.  The troponin elevation is likely demand ischemia or due to acute renal failure.       1.  For now, further evaluation of the troponin elevation is not indicated. She would not be a candidate for invasive procedure and contrast use anyway.     2. Patient develop Afib with RVR that has responded to IV amiodarone.  She has returned to NSR.  I am somewhat worried about continuing amiodarone in the setting of her lung pathology.  Therefore I would stop IV amiodarone and we can use a low dose of oral amiodarone to attempt to maintain sinus rhythm (I have ordered).  She will be at high risk of returning to Afib in the setting of pressor use and sepsis.    3. In regards to her sinus tachycardia, this is likely appropriate in the setting of her sepsis.  We  can continue low dose metoprolol to 25mg BID via NG tube. We can discontinue this if pressor requirements increase     4. We should obtain an EKG today to document her  rhythm.     Nico White MD    Interval History   Patient continues to be intubated.  She is in NSR at this time.     Physical Exam   Temp: 98.7  F (37.1  C) Temp src: Axillary BP: (!) 111/96   Heart Rate: 112 Resp: 26 SpO2: 95 % O2 Device: Mechanical Ventilator    Vitals:    11/28/19 1800 11/29/19 0600 11/30/19 0000   Weight: (!) 173.2 kg (381 lb 13.4 oz) (!) 175.1 kg (386 lb 0.4 oz) (!) 174.6 kg (385 lb)     Vital Signs with Ranges  Temp:  [97.1  F (36.2  C)-98.7  F (37.1  C)] 98.7  F (37.1  C)  Heart Rate:  [] 112  Resp:  [7-33] 26  BP: (100-179)/(44-97) 111/96  MAP:  [59 mmHg-99 mmHg] 84 mmHg  Arterial Line BP: ()/(45-84) 121/67  FiO2 (%):  [75 %] 75 %  SpO2:  [90 %-100 %] 95 %  I/O last 3 completed shifts:  In: 1935.82 [I.V.:1710.82]  Out: 1160 [Urine:1160]    GENERAL APPEARANCE: Intubated  SKIN: Inspection of the skin reveals no rashes, ulcerations, warm, dry  NECK: Supple and symmetric.   Unable to assess JVP  LUNGS: Mechanical lung sounds noted from the ventilator  CARDIOVASCULAR: Regular rhythm, S1-S2 audible, difficult auscultation due to body habitus, no ambrocio murmurs  ABDOMEN: Soft  EXTREMITIES: Possibly 1+ edema.  NEUROLOGIC:  Intubated sedated    Medications     amiodarone 0.5 mg/min (11/30/19 0704)     IV fluid REPLACEMENT ONLY       diltiazem (CARDIZEM) infusion ADULT       propofol (DIPRIVAN) infusion 30 mcg/kg/min (11/30/19 1142)     sodium chloride 10 mL/hr at 11/30/19 0704     vasopressin (PITRESSIN) infusion ADULT (40 mL) 2.4 Units/hr (11/30/19 0916)       azithromycin  500 mg Intravenous Q24H     chlorhexidine  15 mL Mouth/Throat Q12H     furosemide  40 mg Intravenous Q8H     heparin ANTICOAGULANT  5,000 Units Subcutaneous Q8H     insulin aspart  1-6 Units Subcutaneous Q4H     ipratropium - albuterol 0.5 mg/2.5 mg/3 mL  3 mL Nebulization Q4H While awake     methylPREDNISolone  40 mg Intravenous Q8H     metoprolol tartrate  25 mg Per NG tube BID     multivitamins w/minerals   15 mL Per Feeding Tube Daily     nystatin  500,000 Units Swish & Spit 4x Daily     oseltamivir  150 mg Oral or Feeding Tube BID     pantoprazole  40 mg Oral Daily    Or     pantoprazole  40 mg Oral or Feeding Tube Daily     piperacillin-tazobactam  3.375 g Intravenous Q6H     protein modular  1 packet Per Feeding Tube 7x Daily     sodium chloride (PF)  10 mL Intracatheter Q8H       Data   Results for orders placed or performed during the hospital encounter of 11/28/19 (from the past 24 hour(s))   Glucose by meter   Result Value Ref Range    Glucose 154 (H) 70 - 99 mg/dL   US Upper Extremity Venous Duplex Bilateral Port    Narrative    PROCEDURE:  Venous Doppler ultrasound of the bilateral upper extremities    DATE OF PROCEDURE:  11/29/2019 1:13 PM    CLINICAL HISTORY/INDICATION:   59-year-old female with prolonged immobility, in the ICU with upper  extremity swelling. For DVT.    COMPARISON:   9/5/2013    TECHNIQUE:   Grayscale, color-flow, and spectral waveform analysis were performed  of the deep veins of the bilateral upper extremities    FINDINGS:   Limited examination due to patient body habitus, portable technique  and ICU setting.  The right jugular vein demonstrates normal compressibility, color-flow  and spectral waveform.    The right subclavian vein, axillary vein, brachial vein and basilic  vein demonstrate normal compressibility, spectral waveform, color flow  and augmentation.    Short segment right nonocclusive cephalic vein thrombus in the  antecubital fossa    The left jugular vein demonstrates normal compressibility, color-flow  and spectral waveform.    The left subclavian vein, axillary vein, brachial vein and basilic  vein demonstrate normal compressibility, spectral waveform, color flow  and augmentation.    Short segment left cephalic vein thrombus in the distal humerus.      Impression    IMPRESSION:   1.  Short segment nonocclusive right cephalic vein thrombus.  2.  Short segment left  cephalic vein thrombus.    BENJA TURCIOS MD   US Lower Extremity Venous Duplex Bilateral    Narrative    PROCEDURE:  Venous Doppler ultrasound of the bilateral lower extremities    DATE OF PROCEDURE:  11/29/2019 1:13 PM    CLINICAL HISTORY/INDICATION:   59 year-old ICU patient with prolonged immobility enlarged ovaries  swelling. Evaluate for DVT.    COMPARISON:   None relevant    TECHNIQUE:   Grayscale, color-flow, and spectral waveform analysis were performed  of the deep veins of the bilateral lower extremities    FINDINGS:   Very limited exam due to patient body habitus, portable technique and  swelling. The right common femoral vein was not seen the tibial veins  on the left are not seen in the distal superficial femoral vein on the  left was not seen all due to above-mentioned factors.    The right superficial femoral vein and popliteal vein demonstrate  normal compressibility, spectral waveform, color flow and  augmentation.    The left common femoral vein, mid and proximal superficial femoral  vein and popliteal vein demonstrate normal compressibility, spectral  waveform, color flow and augmentation.    The right posterior tibial vein, peroneal vein, and greater saphenous  vein are compressible.      Impression    IMPRESSION:   1. No evidence of deep venous thrombosis in the visualized right lower  extremity  2. No evidence of deep venous thrombosis in the visualized left lower  extremity    BENJA TURCIOS MD   Sputum Culture Aerobic Bacterial   Result Value Ref Range    Specimen Description Sputum     Special Requests Endotracheal     Culture Micro PENDING    Gram stain   Result Value Ref Range    Specimen Description Sputum     Special Requests Endotracheal     Gram Stain <25 PMNs/low power field     Gram Stain Few  Mixed gram negative and positive florencia      Troponin I   Result Value Ref Range    Troponin I ES 0.795 (HH) 0.000 - 0.045 ug/L   Blood gas arterial with oxyhemoglobin (PCU Collect TBD)    Result Value Ref Range    pH Arterial 7.34 (L) 7.35 - 7.45 pH    pCO2 Arterial 60 (H) 35 - 45 mm Hg    pO2 Arterial 99 80 - 105 mm Hg    Bicarbonate Arterial 32 (H) 21 - 28 mmol/L    Oxyhemoglobin Arterial 95 92 - 100 %    Base Excess Art 4.3 mmol/L   Glucose by meter   Result Value Ref Range    Glucose 219 (H) 70 - 99 mg/dL   Glucose by meter   Result Value Ref Range    Glucose 215 (H) 70 - 99 mg/dL   Glucose by meter   Result Value Ref Range    Glucose 228 (H) 70 - 99 mg/dL   CBC Differential (AM Draw)   Result Value Ref Range    WBC 11.4 (H) 4.0 - 11.0 10e9/L    RBC Count 5.13 3.8 - 5.2 10e12/L    Hemoglobin 16.5 (H) 11.7 - 15.7 g/dL    Hematocrit 52.9 (H) 35.0 - 47.0 %     (H) 78 - 100 fl    MCH 32.2 26.5 - 33.0 pg    MCHC 31.2 (L) 31.5 - 36.5 g/dL    RDW 15.3 (H) 10.0 - 15.0 %    Platelet Count 161 150 - 450 10e9/L    Diff Method Automated Method     % Neutrophils 90.0 %    % Lymphocytes 4.4 %    % Monocytes 5.3 %    % Eosinophils 0.0 %    % Basophils 0.1 %    % Immature Granulocytes 0.2 %    Nucleated RBCs 1 (H) 0 /100    Absolute Neutrophil 10.3 (H) 1.6 - 8.3 10e9/L    Absolute Lymphocytes 0.5 (L) 0.8 - 5.3 10e9/L    Absolute Monocytes 0.6 0.0 - 1.3 10e9/L    Absolute Eosinophils 0.0 0.0 - 0.7 10e9/L    Absolute Basophils 0.0 0.0 - 0.2 10e9/L    Abs Immature Granulocytes 0.0 0 - 0.4 10e9/L    Absolute Nucleated RBC 0.1    Comprehensive metabolic panel (AM Draw)   Result Value Ref Range    Sodium 136 133 - 144 mmol/L    Potassium 5.5 (H) 3.4 - 5.3 mmol/L    Chloride 101 94 - 109 mmol/L    Carbon Dioxide 31 20 - 32 mmol/L    Anion Gap 4 3 - 14 mmol/L    Glucose 242 (H) 70 - 99 mg/dL    Urea Nitrogen 52 (H) 7 - 30 mg/dL    Creatinine 1.47 (H) 0.52 - 1.04 mg/dL    GFR Estimate 39 (L) >60 mL/min/[1.73_m2]    GFR Estimate If Black 45 (L) >60 mL/min/[1.73_m2]    Calcium 8.5 8.5 - 10.1 mg/dL    Bilirubin Total 0.6 0.2 - 1.3 mg/dL    Albumin 2.1 (L) 3.4 - 5.0 g/dL    Protein Total 5.5 (L) 6.8 - 8.8 g/dL     Alkaline Phosphatase 113 40 - 150 U/L    ALT 82 (H) 0 - 50 U/L     (H) 0 - 45 U/L   Glucose by meter   Result Value Ref Range    Glucose 238 (H) 70 - 99 mg/dL   Glucose by meter   Result Value Ref Range    Glucose 238 (H) 70 - 99 mg/dL   Glucose by meter   Result Value Ref Range    Glucose 286 (H) 70 - 99 mg/dL

## 2019-12-01 NOTE — PROGRESS NOTES
[facility] ICU RESPIRATORY NOTE        Date of Admission: 11/28/2019    Date of Intubation (most recent): 11/29/19    Reason for Mechanical Ventilation: Resp. Failure    Number of Days on Mechanical Ventilation: 3    Met Criteria for Spontaneous Breathing Trial: No    Reason for No Spontaneous Breathing Trial: per MD    Significant Events Today: None    ABG Results:   Recent Labs   Lab 11/30/19  1650 11/29/19  1825 11/29/19  1024 11/29/19  0415   PH 7.37 7.34* 7.35 7.28*   PCO2 57* 60* 57* 65*   PO2 80 99 93 99   HCO3 32* 32* 32* 30*   O2PER  --   --  Room Air  --        Current Vent Settings: Ventilation Mode: CMV/AC  (Continuous Mandatory Ventilation/ Assist Control)  FiO2 (%): 75 %  Rate Set (breaths/minute): 26 breaths/min  Tidal Volume Set (mL): 450 mL  PEEP (cm H2O): 10 cmH2O  Oxygen Concentration (%): 75 %  Resp: 17      Plan: Will continue full ventilatory support. Assess for weaning criteria in AM    Leila Harper RT

## 2019-12-01 NOTE — PLAN OF CARE
Pt sedated, arouses with vigorous stimulation. Follows commands.  Peep changed to 14. Veletri started. Minimal thick secretions. Auto turn on bed. No family visitors or calls. Cont to monitor.

## 2019-12-01 NOTE — PROGRESS NOTES
Critical Care Progress Note        12/01/2019    Name: Hanna Valle MRN#: [id number]   Age: 59 year old YOB: 1960     Hsptl Day# 3  ICU DAY #3    MV DAY #3             Problem List:   Septic shock  Influenza A   CAP   HFpEF  Pulmonary hypertension with RV dilation   Morbid obesity and likely BEVERLEY/OHS         Summary/Hospital Course:     11/29: Remains on moderate ventilator settings with improvement in oxygenation. More awake with lightening of sedation.    11/30: No change in ventilator needs with stable oxygenation on blood gases.     12/1: Increased PEEP given persistent P/F < 100      Assessment and plan :     Hanna Valle IS a 59 year old female admitted on 11/28/2019 for acute on chronic hypoxemic respiratory failure in the setting of Influenza A and community acquired pneumonia.   I have personally reviewed the daily labs, imaging studies, cultures and discussed the case with referring physician and consulting physicians.   My assessment and plan by system for this patient is as follows:    Neurology/Psychiatry:   1. History depression  2. History alcohol use disorder  3. Toxic metabolic encephalopathy secondary to respiratory failure, sepsis  4. ICU sedation    Plan:  - Holding PTA anti-depressant  - Low threshold for CIWA use when not sedated  - Propofol for sedation, lightened this AM and was following commands    Cardiovascular:   1. HFpEF (EF yesterday 50-55% on echo)  2. Pulmonary hypertension with severely dilated RV with flattened septum   3. Shock (septic)-lactate now normal      Plan   - Remains on vasopressin only, pressures have been stable, consideration for discontinuing today or tomorrow  -  Significant volume overload in setting of poor RV function and elevated right sided filling pressures, diuresing with Lasix 40 mg q8h. UOP has picked up    Pulmonary/Ventilator Management:   1. Acute on chronic hypoxemic respiratory failure  2. CAP   3. Influenza A   4. Likely BEVERLEY   5.  COPD   6. Bilateral pleural effusions      Plan  - Vent this AM  Are /10/26/75%  - Given persistent P/F ratio < 100, will increase PEEP to 14  - Repeat ABG this AM  - May need iVeletri   - Continue Zosyn, Azithromycin and Tamiflu  - Diurese as above  - Scheduled nebs     GI/Nutrition :   1. Morbid obesity   2. Transaminitis: trending down and stable   3. Enteral feedings    Plan  - Tube feeds started yesterday  - PPI   - Trend LFTs as needed     Renal/Fluids/Electrolytes:   1. ORLY in setting of septic shock, improving. Cr down to 1.28 this AM  2. Hyperkalemia   3. Volume overload     Plan  - Diurese as above and trend UOP  - monitor function and electrolytes as needed with replacement per ICU protocols.  - generally avoid nephrotoxic agents such as NSAID, IV contrast unless specifically required  - adjust medications as needed for renal clearance  - follow I/O's as appropriate.    Infectious Disease:   1. Influenza A   2. Community acquired pneumonia   3. E. Coli UTI  4. Chronic cutaneous candida infection, groin     Plan  - Continue Azithromycin and Zosyn  - Continue Tamiflu     Endocrine:   1. Stress induced hyperglycemia  2. Hx of DMII     Plan  - ICU insulin protocol, goal sugar <180  - Switch to medium intensity sliding scale     Hematology/Oncology:   1. Leukocytosis: in setting of critical illness   2. Elevated H/H    Plan  - Continue to monitor     MSK/Rheum:  1. Deconditioning     Plan  - PT/OT when able     Skin:   1. Candidal infection    Plan:   -Nystatin cream     IV/Access:   1. Venous access: PIV and RUE PICC    2. Arterial access: art line in place    Plan  - central access required and necessary    ICU Prophylaxis:   1. DVT: Hep Subq  2. VAP: HOB 30 degrees, chlorhexidine rinse  3. Stress Ulcer: PPI  4. Restraints: Nonviolent soft two point restraints required and necessary for patient safety and continued cares and good effect as patient continues to pull at necessary lines, tubes despite  education and distraction. Will readdress daily.   5. Wound care - per unit routine   6. Feeding - Tube feeds started 11/29  7. Family Update: will update  this PM by phone   8. Disposition - ICU    Key goals for next 24 hours:   1. Check ABG with PEEP at 14  2. Follow UOP with Lasix 40 mg q8 continuing   3. May need to add iVeletri         Interim History/Overnight Events:     No major events overnight. Remains on moderate vent settings. Awoke to voice on exam this AM.           Key Medications:       amiodarone  100 mg Oral Daily     azithromycin  500 mg Intravenous Q24H     chlorhexidine  15 mL Mouth/Throat Q12H     furosemide  40 mg Intravenous Q8H     heparin ANTICOAGULANT  5,000 Units Subcutaneous Q8H     ipratropium - albuterol 0.5 mg/2.5 mg/3 mL  3 mL Nebulization Q4H While awake     methylPREDNISolone  40 mg Intravenous Q8H     metoprolol tartrate  25 mg Per NG tube BID     multivitamins w/minerals  15 mL Per Feeding Tube Daily     nystatin  500,000 Units Swish & Spit 4x Daily     oseltamivir  150 mg Oral or Feeding Tube BID     pantoprazole  40 mg Oral Daily    Or     pantoprazole  40 mg Oral or Feeding Tube Daily     piperacillin-tazobactam  3.375 g Intravenous Q6H     protein modular  1 packet Per Feeding Tube 7x Daily     sodium chloride (PF)  10 mL Intracatheter Q8H       IV fluid REPLACEMENT ONLY       diltiazem (CARDIZEM) infusion ADULT       insulin (regular) 4 Units/hr (12/01/19 0814)     propofol (DIPRIVAN) infusion 35 mcg/kg/min (12/01/19 0703)     sodium chloride 10 mL/hr at 11/30/19 0704     vasopressin (PITRESSIN) infusion ADULT (40 mL) 2.4 Units/hr (11/30/19 2334)               Physical Examination:   Temp:  [98.7  F (37.1  C)-99.9  F (37.7  C)] 99.9  F (37.7  C)  Heart Rate:  [105-125] 123  Resp:  [0-31] 25  MAP:  [67 mmHg-102 mmHg] 84 mmHg  Arterial Line BP: ()/(55-91) 114/71  FiO2 (%):  [75 %] 75 %  SpO2:  [94 %-97 %] 95 %    Intake/Output Summary (Last 24 hours) at 11/30/2019  1105  Last data filed at 11/30/2019 0800  Gross per 24 hour   Intake 1909.15 ml   Output 1130 ml   Net 779.15 ml     Wt Readings from Last 4 Encounters:   12/01/19 (!) 181.4 kg (399 lb 14.4 oz)   09/17/18 136.1 kg (300 lb)   11/25/16 (!) 142.4 kg (314 lb)   10/31/16 (!) 146.1 kg (322 lb)     Arterial Line BP: ()/(55-91) 114/71  MAP:  [67 mmHg-102 mmHg] 84 mmHg  Ventilation Mode: CMV/AC  (Continuous Mandatory Ventilation/ Assist Control)  FiO2 (%): 75 %  Rate Set (breaths/minute): 26 breaths/min  Tidal Volume Set (mL): 450 mL  PEEP (cm H2O): 10 cmH2O  Oxygen Concentration (%): 75 %  Resp: 25    Recent Labs   Lab 12/01/19  0815 11/30/19  1650 11/29/19  1825 11/29/19  1024   PH 7.38 7.37 7.34* 7.35   PCO2 58* 57* 60* 57*   PO2 73* 80 99 93   HCO3 34* 32* 32* 32*   O2PER  --   --   --  Room Air       General:   Intubated and sedated    Neurologic:   Sedation: lightly sedated and awakable   HEENT:   Head is atraumatic      Lungs:   Symmetrical chest shape and movements with each tidal breath  Abnormal lung auscultation: rhonchi in upper lung fields    Cardiovascular:   Regular rate and rhythm  Normal S1,S2, and no gallop, rub or murmur   Abdomen:   Distended:  No.   Bowel sound present and normal: Yes .   Soft: Yes . Tender: No.   Rebound:tendeness or guarding present No   Extremities:   Clubbing present: No,   Edema present: Yes ,   Acrocyanosis present: No,   Mottling present: No,   Normal capillary refill: Yes    Skin:   Warm, dry.  No rash on limited exam.    Lines/Drain:    Central line present: Yes   Arterial line present: Yes   External ventricular Drain present: No  Chest tube present: No  LUCY present: No  PEG present: No            Data:   All data and imaging reviewed.     ROUTINE ICU LABS (Last four results)  CMP  Recent Labs   Lab 12/01/19  0609 11/30/19  0525 11/29/19  0450 11/28/19 2110 11/28/19  1840  11/28/19  1406    136 138  --  139   < > 138   POTASSIUM 4.3 5.5* 4.9 5.2 5.4*   < > 7.1*    CHLORIDE 102 101 104  --  103   < > 102   CO2 34* 31 30  --  28   < > 31   ANIONGAP 3 4 4  --  8   < > 5   * 242* 224*  --  128*   < > 78   BUN 50* 52* 50*  --  45*   < > 43*   CR 1.28* 1.47* 1.97*  --  2.02*   < > 2.36*   GFRESTIMATED 46* 39* 27*  --  27*   < > 22*   GFRESTBLACK 53* 45* 32*  --  31*   < > 26*   ANIBAL 8.5 8.5 8.1*  --  8.7   < > 9.3   MAG  --   --   --   --   --   --  2.1   PHOS  --   --   --   --   --   --  9.4*   PROTTOTAL  --  5.5* 5.2*  --  6.2*  --  7.1   ALBUMIN  --  2.1* 2.2*  --  2.8*  --  3.2*   BILITOTAL  --  0.6 0.3  --  0.6  --  0.9   ALKPHOS  --  113 115  --  147  --  163*   AST  --  217* 256*  --  213*  --  126*   ALT  --  82* 70*  --  61*  --  42    < > = values in this interval not displayed.     CBC  Recent Labs   Lab 11/30/19  0525 11/29/19  0450 11/28/19  1840 11/28/19  1746 11/28/19  1659  11/28/19  1406   WBC 11.4* 8.1 8.0  --   --   --  7.5   RBC 5.13 4.87 5.20  --   --   --  5.38*   HGB 16.5* 15.6 16.9*  --  19.4*   < > 17.8*   HCT 52.9* 53.2* 57.4*  --   --   --  60.1*   * 109* 110*  --   --   --  112*   MCH 32.2 32.0 32.5  --   --   --  33.1*   MCHC 31.2* 29.3* 29.4*  --   --   --  29.6*   RDW 15.3* 15.4* 15.4*  --   --   --  15.3*    162 147* 134*  --   --  149*    < > = values in this interval not displayed.     INR  Recent Labs   Lab 11/28/19  1406   INR 1.40*     Arterial Blood Gas  Recent Labs   Lab 12/01/19  0815 11/30/19  1650 11/29/19  1825 11/29/19  1024   PH 7.38 7.37 7.34* 7.35   PCO2 58* 57* 60* 57*   PO2 73* 80 99 93   HCO3 34* 32* 32* 32*   O2PER  --   --   --  Room Air       All cultures:  Recent Labs   Lab 11/29/19  1553 11/28/19  1612 11/28/19  1439 11/28/19  1406   CULT Culture in progress >100,000 colonies/mL  Escherichia coli  *  50,000 to 100,000 colonies/mL  Klebsiella pneumoniae  *  10,000 to 50,000 colonies/mL  Strain 2  Escherichia coli  * No growth after 3 days No growth after 3 days     No results found for this or any  previous visit (from the past 24 hour(s)).              Billing: This patient is critically ill: Yes. Total critical care time today 35 min. This does not include time spent on procedures or teaching.     Aly Amador MD  Pulmonary & Critical Care Medicine  [facility]   Pager: [phone]

## 2019-12-01 NOTE — PROVIDER NOTIFICATION
Dr. White notified of patients  atrial fib, new orders received , currently Hr is 105 after 5m Iv metoprool given , continue to monitor HR

## 2019-12-01 NOTE — PROGRESS NOTES
[facility]    Cardiology Progress Note     Assessment & Plan   DIAGNOSES/ASSESSMENT/PLAN:   1.  Acute hypoxemic hypercarbic respiratory failure, requiring mechanical support.   2.  Influenza A pneumonia.   3.  Sepsis secondary to #1.   4.  Acute kidney injury and hyperkalemia.   5.  Obesity hypoventilation syndrome.   6.  Troponin elevation, likely demand ischemia from the above.   7.  Morbid obesity with a BMI of 54 kg/m2.   8.  Failure to thrive.   9.  Type 2 diabetes.   10.  Hypertension.      Hanna is admitted with multiple acute comorbidities -- renal failure, respiratory failure, sepsis, influenza, pneumonia and very likely has severe untreated obesity hypoventilation syndrome with chronic obstructive pulmonary disease (COPD) and active tobacco use.  She also has severe pulmonary hypertension which is likely secondary to obesity hypoventilation syndrome and her respiratory insult.  She has normal left ventricular (LV) wall motion on echo and no significant ischemic changes on ECG.  The troponin elevation is likely demand ischemia or due to acute renal failure.       1.  For now, further evaluation of the troponin elevation is not indicated. She would not be a candidate for invasive procedure and contrast use anyway.     2. Patient develop Afib with RVR that has responded to IV amiodarone.  She returned to normal sinus rhythm yesterday.  Today her heart rate is elevated again.  We will obtain a repeat EKG.  We will continue low-dose amiodarone.  High-dose amiodarone may be with elevated risk due to her ongoing lung pathology. She will be at high risk of returning to Afib in the setting of pressor use and sepsis.    3. In regards to her sinus tachycardia, this is likely appropriate in the setting of her sepsis.  We  can continue low dose metoprolol to 25mg BID via NG tube. We can discontinue this if pressor requirements increase     4. We should obtain an EKG today to document her rhythm.      Nico White MD    Interval History   Patient continues to be intubated.     Physical Exam   Temp: 98.2  F (36.8  C) Temp src: Axillary    Heart Rate: 126 Resp: 26 SpO2: 96 % O2 Device: Mechanical Ventilator    Vitals:    11/29/19 0600 11/30/19 0000 12/01/19 0615   Weight: (!) 175.1 kg (386 lb 0.4 oz) (!) 174.6 kg (385 lb) (!) 181.4 kg (399 lb 14.4 oz)     Vital Signs with Ranges  Temp:  [98.2  F (36.8  C)-99.9  F (37.7  C)] 98.2  F (36.8  C)  Heart Rate:  [111-126] 126  Resp:  [0-31] 26  MAP:  [67 mmHg-126 mmHg] 93 mmHg  Arterial Line BP: ()/() 100/87  FiO2 (%):  [75 %] 75 %  SpO2:  [81 %-97 %] 96 %  I/O last 3 completed shifts:  In: 2081.23 [I.V.:1311.23; NG/GT:170]  Out: 2950 [Urine:2800; Stool:150]    GENERAL APPEARANCE: Intubated  SKIN: Inspection of the skin reveals no rashes, ulcerations, warm, dry  NECK: Supple and symmetric.   Unable to assess JVP  LUNGS: Mechanical lung sounds noted from the ventilator  CARDIOVASCULAR: Regular rhythm, S1-S2 audible, difficult auscultation due to body habitus, no ambrocio murmurs  ABDOMEN: Soft  EXTREMITIES: Possibly 1+ edema.  NEUROLOGIC:  Intubated sedated    Medications     IV fluid REPLACEMENT ONLY       diltiazem (CARDIZEM) infusion ADULT       epoprostenol (VELETRI) 20 mcg/mL in sterile water inhalation solution       insulin (regular) 6 Units/hr (12/01/19 1137)     propofol (DIPRIVAN) infusion 35 mcg/kg/min (12/01/19 1007)     sodium chloride 10 mL/hr at 11/30/19 0704     vasopressin (PITRESSIN) infusion ADULT (40 mL) 2.4 Units/hr (11/30/19 2334)       amiodarone  100 mg Oral Daily     azithromycin  500 mg Intravenous Q24H     chlorhexidine  15 mL Mouth/Throat Q12H     furosemide  40 mg Intravenous Q8H     heparin ANTICOAGULANT  5,000 Units Subcutaneous Q8H     ipratropium - albuterol 0.5 mg/2.5 mg/3 mL  3 mL Nebulization Q4H While awake     methylPREDNISolone  40 mg Intravenous Q8H     metoprolol tartrate  25 mg Per NG tube BID      multivitamins w/minerals  15 mL Per Feeding Tube Daily     nystatin  500,000 Units Swish & Spit 4x Daily     oseltamivir  150 mg Oral or Feeding Tube BID     pantoprazole  40 mg Oral Daily    Or     pantoprazole  40 mg Oral or Feeding Tube Daily     piperacillin-tazobactam  3.375 g Intravenous Q6H     protein modular  1 packet Per Feeding Tube 7x Daily     sodium chloride (PF)  10 mL Intracatheter Q8H       Data   Results for orders placed or performed during the hospital encounter of 11/28/19 (from the past 24 hour(s))   Glucose by meter   Result Value Ref Range    Glucose 282 (H) 70 - 99 mg/dL   Blood gas arterial with oxyhemoglobin (PCU Collect TBD)   Result Value Ref Range    pH Arterial 7.37 7.35 - 7.45 pH    pCO2 Arterial 57 (H) 35 - 45 mm Hg    pO2 Arterial 80 80 - 105 mm Hg    Bicarbonate Arterial 32 (H) 21 - 28 mmol/L    Oxyhemoglobin Arterial 94 92 - 100 %    Base Excess Art 5.0 mmol/L   Glucose by meter   Result Value Ref Range    Glucose 309 (H) 70 - 99 mg/dL   Glucose by meter   Result Value Ref Range    Glucose 312 (H) 70 - 99 mg/dL   Glucose by meter   Result Value Ref Range    Glucose 300 (H) 70 - 99 mg/dL   Glucose by meter   Result Value Ref Range    Glucose 249 (H) 70 - 99 mg/dL   Glucose by meter   Result Value Ref Range    Glucose 242 (H) 70 - 99 mg/dL   Basic metabolic panel   Result Value Ref Range    Sodium 139 133 - 144 mmol/L    Potassium 4.3 3.4 - 5.3 mmol/L    Chloride 102 94 - 109 mmol/L    Carbon Dioxide 34 (H) 20 - 32 mmol/L    Anion Gap 3 3 - 14 mmol/L    Glucose 221 (H) 70 - 99 mg/dL    Urea Nitrogen 50 (H) 7 - 30 mg/dL    Creatinine 1.28 (H) 0.52 - 1.04 mg/dL    GFR Estimate 46 (L) >60 mL/min/[1.73_m2]    GFR Estimate If Black 53 (L) >60 mL/min/[1.73_m2]    Calcium 8.5 8.5 - 10.1 mg/dL   Glucose by meter   Result Value Ref Range    Glucose 210 (H) 70 - 99 mg/dL   Glucose by meter   Result Value Ref Range    Glucose 168 (H) 70 - 99 mg/dL   Blood gas arterial with oxyhemoglobin (PCU  Collect TBD)   Result Value Ref Range    pH Arterial 7.38 7.35 - 7.45 pH    pCO2 Arterial 58 (H) 35 - 45 mm Hg    pO2 Arterial 73 (L) 80 - 105 mm Hg    Bicarbonate Arterial 34 (H) 21 - 28 mmol/L    Oxyhemoglobin Arterial 93 92 - 100 %    Base Excess Art 6.6 mmol/L   Glucose by meter   Result Value Ref Range    Glucose 168 (H) 70 - 99 mg/dL   Glucose by meter   Result Value Ref Range    Glucose 188 (H) 70 - 99 mg/dL   Glucose by meter   Result Value Ref Range    Glucose 178 (H) 70 - 99 mg/dL   Blood gas arterial with oxyhemoglobin (PCU Collect TBD)   Result Value Ref Range    pH Arterial 7.40 7.35 - 7.45 pH    pCO2 Arterial 53 (H) 35 - 45 mm Hg    pO2 Arterial 100 80 - 105 mm Hg    Bicarbonate Arterial 32 (H) 21 - 28 mmol/L    Oxyhemoglobin Arterial 96 92 - 100 %    Base Excess Art 5.6 mmol/L   Glucose by meter   Result Value Ref Range    Glucose 151 (H) 70 - 99 mg/dL

## 2019-12-01 NOTE — PLAN OF CARE
Neuro: Pt sedated on propofol gtt, follows commands, answers yes/no questions appropriately.  Moves all extremities equally, pupils PERRL.  CV: Tele ST, infrequent PVCs.  BP WDL on vaso gtt.  Resp: Remains on full vent support.  Lung sounds diminished.  Creamy/thick secretions per ETT.  GI: Diarrhea, incontinent.  Rectal tube now in place.  : Grey with adequate UOP.  Skin: Skin care protocol in place.  Plan:  Continue to monitor.

## 2019-12-01 NOTE — PROGRESS NOTES
Fady's test performed prior to radial ABG draw. Collateral circulation confirmed.  Site: Right Radial   FiO2: 75%   Device-  Ventilator        Leeann Fam RT,   12/1/2019 4:54 PM

## 2019-12-02 NOTE — PROGRESS NOTES
[facility] ICU RESPIRATORY NOTE  Date of Admission: 11/28/2019  Date of Intubation (most recent): 11/29/2019    Reason for Mechanical Ventilation: Resp failure    Number of Days on Mechanical Ventilation: 3    Met Criteria for Pressure Support Trial: No    Reason for No Pressure Support Trial: Pt still on high FiO@ and High PEEP.    Significant Events Today: Pt was started on Veletri today.    ABG Results:  Recent Labs   Lab 12/01/19  1650 12/01/19  1135 12/01/19  0815 11/30/19  1650  11/29/19  1024   PH 7.46* 7.40 7.38 7.37   < > 7.35   PCO2 49* 53* 58* 57*   < > 57*   PO2 84 100 73* 80   < > 93   HCO3 35* 32* 34* 32*   < > 32*   O2PER 75%  --   --   --   --  Room Air    < > = values in this interval not displayed.         Ventilation Mode: CMV/AC  (Continuous Mandatory Ventilation/ Assist Control)  FiO2 (%): 75 %  Rate Set (breaths/minute): 26 breaths/min  Tidal Volume Set (mL): 450 mL  PEEP (cm H2O): 14 cmH2O  Oxygen Concentration (%): 75 %  Resp: 23        Plan:  Will continue full ventilatory support for now and assess for weaning readiness daily.         Leeann Fam, RT,  12/1/2019

## 2019-12-02 NOTE — PROGRESS NOTES
[facility]    Cardiology Progress Note     Assessment & Plan   DIAGNOSES/ASSESSMENT/PLAN:   1.  Acute hypoxemic hypercarbic respiratory failure, requiring mechanical support.   2.  Influenza A pneumonia.   3.  Sepsis secondary to #1.   4.  Acute kidney injury and hyperkalemia.   5.  Obesity hypoventilation syndrome.   6.  Troponin elevation, likely demand ischemia from the above.   7.  Morbid obesity with a BMI of 54 kg/m2.   8.  Failure to thrive.   9.  Type 2 diabetes.   10.  Hypertension.   11.  Severe Pulm HTN - Chronic     Hanna is admitted with multiple acute comorbidities -- renal failure, respiratory failure, sepsis, influenza, pneumonia and very likely has severe untreated obesity hypoventilation syndrome with chronic obstructive pulmonary disease (COPD) and active tobacco use.  She also has severe pulmonary hypertension which is likely secondary to obesity hypoventilation syndrome and her respiratory insult.  She has normal left ventricular (LV) wall motion on echo and no significant ischemic changes on ECG.  The troponin elevation is likely demand ischemia or due to acute renal failure.       1.  For now, further evaluation of the troponin elevation is not indicated. Medical management.    2.  Patient is currently off pressor support.  Would continue with scheduled metoprolol and supplementation as needed.   Heart rate has been better controlled over the last 24 hours      Echo 11/29/19  The visual ejection fraction is estimated at 50-55%.  No regional wall motion abnormalities noted.  Flattened septum is consistent with RV pressure/volume overload.  There is mild concentric left ventricular hypertrophy.  The right ventricle is moderate to severely dilated.  Moderately decreased right ventricular systolic function  The right atrium is moderate to severely dilated.  There is mild (1+) tricuspid regurgitation.  Trivial pericardial effusion  RVSP is likely underestimated due to poor TR  envelope.     No significant change compared to 11/1/16 report      Zackary Barrow MD    Interval History   Patient continues to be intubated.     Physical Exam   Temp: 98.6  F (37  C) Temp src: Axillary BP: (!) 150/123 Pulse: 106 Heart Rate: 89 Resp: (!) 0 SpO2: 93 % O2 Device: Mechanical Ventilator    Vitals:    11/30/19 0000 12/01/19 0615 12/02/19 0700   Weight: (!) 174.6 kg (385 lb) (!) 181.4 kg (399 lb 14.4 oz) (!) 181.3 kg (399 lb 12.8 oz)     Vital Signs with Ranges  Temp:  [98.2  F (36.8  C)-99.1  F (37.3  C)] 98.6  F (37  C)  Pulse:  [] 106  Heart Rate:  [] 89  Resp:  [0-29] 0  BP: (108-154)/() 150/123  MAP:  [74 mmHg-126 mmHg] 119 mmHg  Arterial Line BP: ()/() 123/115  FiO2 (%):  [65 %-75 %] 65 %  SpO2:  [81 %-98 %] 93 %  I/O last 3 completed shifts:  In: 2069.73 [I.V.:1169.73; NG/GT:180]  Out: 3930 [Urine:3730; Stool:200]    GENERAL APPEARANCE: Intubated  SKIN: Inspection of the skin reveals no rashes, ulcerations, warm, dry  NECK: Supple and symmetric.   Unable to assess JVP  LUNGS: Mechanical lung sounds noted from the ventilator  CARDIOVASCULAR: Regular rhythm, S1-S2 audible, difficult auscultation due to body habitus, no ambrocio murmurs  ABDOMEN: Soft  EXTREMITIES: Possibly 1+ edema.  NEUROLOGIC:  Intubated sedated    Medications     IV fluid REPLACEMENT ONLY       epoprostenol (VELETRI) 20 mcg/mL in sterile water inhalation solution 20 ng/kg/min (12/02/19 0001)     insulin (regular) 6 Units/hr (12/02/19 0851)     propofol (DIPRIVAN) infusion 35 mcg/kg/min (12/02/19 0845)     sodium chloride 10 mL/hr at 11/30/19 0704       cefTRIAXone  2 g Intravenous Q24H     chlorhexidine  15 mL Mouth/Throat Q12H     furosemide  40 mg Intravenous Q8H     heparin ANTICOAGULANT  5,000 Units Subcutaneous Q8H     ipratropium - albuterol 0.5 mg/2.5 mg/3 mL  3 mL Nebulization Q4H While awake     [START ON 12/3/2019] methylPREDNISolone  40 mg Intravenous Q24H     metoprolol tartrate  25  mg Per NG tube TID     multivitamins w/minerals  15 mL Per Feeding Tube Daily     oseltamivir  150 mg Oral or Feeding Tube BID     pantoprazole  40 mg Oral Daily    Or     pantoprazole  40 mg Oral or Feeding Tube Daily     protein modular  1 packet Per Feeding Tube 7x Daily     sodium chloride (PF)  10 mL Intracatheter Q8H       Data   Results for orders placed or performed during the hospital encounter of 11/28/19 (from the past 24 hour(s))   Glucose by meter   Result Value Ref Range    Glucose 168 (H) 70 - 99 mg/dL   Glucose by meter   Result Value Ref Range    Glucose 188 (H) 70 - 99 mg/dL   Glucose by meter   Result Value Ref Range    Glucose 178 (H) 70 - 99 mg/dL   Blood gas arterial with oxyhemoglobin (PCU Collect TBD)   Result Value Ref Range    pH Arterial 7.40 7.35 - 7.45 pH    pCO2 Arterial 53 (H) 35 - 45 mm Hg    pO2 Arterial 100 80 - 105 mm Hg    Bicarbonate Arterial 32 (H) 21 - 28 mmol/L    Oxyhemoglobin Arterial 96 92 - 100 %    Base Excess Art 5.6 mmol/L   Glucose by meter   Result Value Ref Range    Glucose 151 (H) 70 - 99 mg/dL   EKG 12-lead, tracing only   Result Value Ref Range    Interpretation ECG Click View Image link to view waveform and result    Glucose by meter   Result Value Ref Range    Glucose 165 (H) 70 - 99 mg/dL   Glucose by meter   Result Value Ref Range    Glucose 146 (H) 70 - 99 mg/dL   Glucose by meter   Result Value Ref Range    Glucose 150 (H) 70 - 99 mg/dL   Glucose by meter   Result Value Ref Range    Glucose 159 (H) 70 - 99 mg/dL   Blood gas arterial with oxyhemoglobin (1200)   Result Value Ref Range    pH Arterial 7.46 (H) 7.35 - 7.45 pH    pCO2 Arterial 49 (H) 35 - 45 mm Hg    pO2 Arterial 84 80 - 105 mm Hg    Bicarbonate Arterial 35 (H) 21 - 28 mmol/L    FIO2 75%     Oxyhemoglobin Arterial 95 92 - 100 %    Base Excess Art 8.8 mmol/L   Glucose by meter   Result Value Ref Range    Glucose 162 (H) 70 - 99 mg/dL   Potassium   Result Value Ref Range    Potassium 4.0 3.4 - 5.3  mmol/L   Glucose by meter   Result Value Ref Range    Glucose 116 (H) 70 - 99 mg/dL   Glucose by meter   Result Value Ref Range    Glucose 153 (H) 70 - 99 mg/dL   Glucose by meter   Result Value Ref Range    Glucose 172 (H) 70 - 99 mg/dL   Glucose by meter   Result Value Ref Range    Glucose 173 (H) 70 - 99 mg/dL   Glucose by meter   Result Value Ref Range    Glucose 169 (H) 70 - 99 mg/dL   Glucose by meter   Result Value Ref Range    Glucose 142 (H) 70 - 99 mg/dL   Glucose by meter   Result Value Ref Range    Glucose 122 (H) 70 - 99 mg/dL   Basic metabolic panel   Result Value Ref Range    Sodium 139 133 - 144 mmol/L    Potassium 3.8 3.4 - 5.3 mmol/L    Chloride 100 94 - 109 mmol/L    Carbon Dioxide 35 (H) 20 - 32 mmol/L    Anion Gap 4 3 - 14 mmol/L    Glucose 100 (H) 70 - 99 mg/dL    Urea Nitrogen 49 (H) 7 - 30 mg/dL    Creatinine 0.99 0.52 - 1.04 mg/dL    GFR Estimate 63 >60 mL/min/[1.73_m2]    GFR Estimate If Black 73 >60 mL/min/[1.73_m2]    Calcium 7.7 (L) 8.5 - 10.1 mg/dL   Magnesium   Result Value Ref Range    Magnesium 1.6 1.6 - 2.3 mg/dL   Phosphorus   Result Value Ref Range    Phosphorus 2.5 2.5 - 4.5 mg/dL   Glucose by meter   Result Value Ref Range    Glucose 98 70 - 99 mg/dL

## 2019-12-02 NOTE — PROGRESS NOTES
[facility] ICU RESPIRATORY NOTE        Date of Admission: 11/28/2019    Date of Intubation (most recent): 11/29/19    Reason for Mechanical Ventilation: Respiratory Failure    Number of Days on Mechanical Ventilation: 4    Met Criteria for Spontaneous Breathing Trial: No    Reason for No Spontaneous Breathing Trial: per MD    Significant Events Today: None    ABG Results:   Recent Labs   Lab 12/01/19  1650 12/01/19  1135 12/01/19  0815 11/30/19  1650  11/29/19  1024   PH 7.46* 7.40 7.38 7.37   < > 7.35   PCO2 49* 53* 58* 57*   < > 57*   PO2 84 100 73* 80   < > 93   HCO3 35* 32* 34* 32*   < > 32*   O2PER 75%  --   --   --   --  Room Air    < > = values in this interval not displayed.       Current Vent Settings: Ventilation Mode: CMV/AC  (Continuous Mandatory Ventilation/ Assist Control)  FiO2 (%): 65 %  Rate Set (breaths/minute): 26 breaths/min  Tidal Volume Set (mL): 450 mL  PEEP (cm H2O): 14 cmH2O  Oxygen Concentration (%): 65 %  Resp: 26      Plan: Will continue full ventilatory support. Assess for weaning criteria in AM    Leila Harper, RT

## 2019-12-02 NOTE — PLAN OF CARE
Patient remains intubated and sedated , RAAS -3, Veletri inhalation started today, abg check done PH 7.46, P02 [id number] , Dr Shaffer notified of abg results no change in vent settings, continue to wean Fi02, Heart rate 115's, sinus dysrhythmia,  updated on plan of care by phone questions answered and emotional support given

## 2019-12-02 NOTE — PROGRESS NOTES
Critical Care Progress Note        12/02/2019    Name: Hanna Valle MRN#: [id number]   Age: 59 year old YOB: 1960     Hsptl Day# 4  ICU DAY #4    MV DAY #4             Problem List:   Influenza A    HFpEF  Pulmonary hypertension with RV failure  Morbid obesity and likely BEVERLEY/OHS         Summary/Hospital Course:     11/29: Remains on moderate ventilator settings with improvement in oxygenation. More awake with lightening of sedation.    11/30: No change in ventilator needs with stable oxygenation on blood gases.     12/1: Increased PEEP given persistent P/F < 100      Assessment and plan :     Hanna Valle IS a 59 year old female admitted on 11/28/2019 for acute on chronic hypoxemic respiratory failure in the setting of Influenza A and community acquired pneumonia.     My assessment and plan by system for this patient is as follows:    Neurology/Psychiatry:   1. History depression  2. History alcohol use disorder  3. Toxic metabolic encephalopathy secondary to respiratory failure, sepsis  4. ICU sedation    Plan:  - get med rec to find out what neuro meds she was on PTA  - Propofol for sedation    Cardiovascular:   1. HFpEF (EF yesterday 50-55% on echo)  2. Pulmonary hypertension with RV failure     Plan   - now off vasopressin  - continue lasix 40 mg TID  - check venous oxyhemoglobin  - trend CVP (has a PICC line, so absolute value likely inaccurate).     Pulmonary/Ventilator Management:   1. Acute on chronic hypoxemic respiratory failure  2. CAP   3. Influenza A   4. Likely BEVERLEY   5. Bilateral pleural effusions      Plan  - Vent this AM  Are CMV 16/450/65/14  -  iVeletri   - Continue Zosyn, Azithromycin and Tamiflu  - discontinue azithromycin   - de-escalate abx to ceftriaxone to complete 5 days of antibiotics for possible CAP  - Diurese as above  - Scheduled nebs   - decrease steroids from 40 q 8 to 40 daily.     GI/Nutrition :   1. Morbid obesity   2. Transaminitis: trending down and stable   3.  Enteral feedings    Plan  - Tube feeds started yesterday  - PPI   - Trend LFTs as needed      Renal/Fluids/Electrolytes:   1. ORLY in setting of septic shock, improving  2. Volume overload     Plan  - Diurese as above and trend UOP  - monitor function and electrolytes as needed with replacement per ICU protocols.  - generally avoid nephrotoxic agents such as NSAID, IV contrast unless specifically required  - adjust medications as needed for renal clearance  - follow I/O's as appropriate.    Infectious Disease:   1. Influenza A   2. Community acquired pneumonia   3. E. Coli and K.pneumoniae UTI  4. Chronic cutaneous candida infection, groin     Plan  - stop azithromycin  - switch zosyn to ceftriaxone to complete 5 days  Total.   - Continue Tamiflu     Endocrine:   1. Stress induced hyperglycemia  2. Hx of DMII     Plan  - ICU insulin protocol, goal sugar <180  - Switch to medium intensity sliding scale     Hematology/Oncology:   1. Leukocytosis: in setting of critical illness   2. Elevated H/H    Plan  - Continue to monitor     MSK/Rheum:  1. Deconditioning     Plan  - PT/OT when able     Skin:   1. Candidal infection    Plan:   -Nystatin cream     IV/Access:   1. Venous access: PIV and RUE PICC    2. Arterial access: art line in place    Plan  - central access required and necessary    ICU Prophylaxis:   1. DVT: Hep Subq  2. VAP: HOB 30 degrees, chlorhexidine rinse  3. Stress Ulcer: PPI  4. Restraints: Nonviolent soft two point restraints required and necessary for patient safety and continued cares and good effect as patient continues to pull at necessary lines, tubes despite education and distraction. Will readdress daily.   5. Wound care - per unit routine   6. Feeding - Tube feeds started 11/29  7. Family Update: not yet today  8. Disposition - ICU    Key goals for next 24 hours:   1. De-escalate antibiotics  2. Continue diuresis         Interim History/Overnight Events:     Vasopressin taken off yesterday.  No  acute events overnight.           Key Medications:       chlorhexidine  15 mL Mouth/Throat Q12H     furosemide  40 mg Intravenous Q8H     heparin ANTICOAGULANT  5,000 Units Subcutaneous Q8H     ipratropium - albuterol 0.5 mg/2.5 mg/3 mL  3 mL Nebulization Q4H While awake     [START ON 12/3/2019] methylPREDNISolone  40 mg Intravenous Q24H     metoprolol tartrate  25 mg Per NG tube Q8H     multivitamins w/minerals  15 mL Per Feeding Tube Daily     oseltamivir  150 mg Oral or Feeding Tube BID     pantoprazole  40 mg Oral Daily    Or     pantoprazole  40 mg Oral or Feeding Tube Daily     protein modular  1 packet Per Feeding Tube 7x Daily     sodium chloride (PF)  10 mL Intracatheter Q8H       IV fluid REPLACEMENT ONLY       epoprostenol (VELETRI) 20 mcg/mL in sterile water inhalation solution 20 ng/kg/min (12/02/19 0001)     insulin (regular) Stopped (12/02/19 0741)     propofol (DIPRIVAN) infusion 35 mcg/kg/min (12/02/19 0554)     sodium chloride 10 mL/hr at 11/30/19 0704               Physical Examination:   Temp:  [98.2  F (36.8  C)-99.1  F (37.3  C)] 98.6  F (37  C)  Pulse:  [] 106  Heart Rate:  [] 89  Resp:  [0-29] 0  BP: (108-154)/() 150/123  MAP:  [74 mmHg-126 mmHg] 119 mmHg  Arterial Line BP: ()/() 123/115  FiO2 (%):  [65 %-75 %] 65 %  SpO2:  [81 %-98 %] 93 %    Intake/Output Summary (Last 24 hours) at 11/30/2019 1105  Last data filed at 11/30/2019 0800  Gross per 24 hour   Intake 1909.15 ml   Output 1130 ml   Net 779.15 ml     Wt Readings from Last 4 Encounters:   12/02/19 (!) 181.3 kg (399 lb 12.8 oz)   09/17/18 136.1 kg (300 lb)   11/25/16 (!) 142.4 kg (314 lb)   10/31/16 (!) 146.1 kg (322 lb)     Arterial Line BP: ()/() 123/115  MAP:  [74 mmHg-126 mmHg] 119 mmHg  BP - Mean:  [] 130  Ventilation Mode: CMV/AC  (Continuous Mandatory Ventilation/ Assist Control)  FiO2 (%): 65 %  Rate Set (breaths/minute): 26 breaths/min  Tidal Volume Set (mL): 450 mL  PEEP (cm  H2O): 14 cmH2O  Oxygen Concentration (%): 65 %  Resp: (!) 0    Recent Labs   Lab 12/01/19  1650 12/01/19  1135 12/01/19  0815 11/30/19  1650  11/29/19  1024   PH 7.46* 7.40 7.38 7.37   < > 7.35   PCO2 49* 53* 58* 57*   < > 57*   PO2 84 100 73* 80   < > 93   HCO3 35* 32* 34* 32*   < > 32*   O2PER 75%  --   --   --   --  Room Air    < > = values in this interval not displayed.       General:   Following basic commands   Neurologic:   Sedation: lightly sedated and awakable   HEENT:   Head is atraumatic      Lungs:   Symmetrical chest shape and movements with each tidal breath  Abnormal lung auscultation: rhonchi bilaterally   Cardiovascular:   Irregular rhythm. tachycardic  Normal S1,S2, and no gallop, rub or murmur   Abdomen:   Distended:  No.   Bowel sound present and normal: Yes .   Soft: Yes . Tender: No.   Rebound:tendeness or guarding present No   Extremities:   Clubbing present: No,   Edema present: Yes ,   Acrocyanosis present: No,   Mottling present: No,   Normal capillary refill: Yes    Skin:   Warm, dry.  Excoriated rash on left leg.    Lines/Drain:    Central line present: Yes   Arterial line present: no  External ventricular Drain present: No  Chest tube present: No  LUCY present: No  PEG present: No            Data:   All data and imaging reviewed.     ROUTINE ICU LABS (Last four results)  CMP  Recent Labs   Lab 12/02/19  0600 12/01/19  1840 12/01/19  0609 11/30/19  0525 11/29/19  0450  11/28/19  1840  11/28/19  1406     --  139 136 138  --  139   < > 138   POTASSIUM 3.8 4.0 4.3 5.5* 4.9   < > 5.4*   < > 7.1*   CHLORIDE 100  --  102 101 104  --  103   < > 102   CO2 35*  --  34* 31 30  --  28   < > 31   ANIONGAP 4  --  3 4 4  --  8   < > 5   *  --  221* 242* 224*  --  128*   < > 78   BUN 49*  --  50* 52* 50*  --  45*   < > 43*   CR 0.99  --  1.28* 1.47* 1.97*  --  2.02*   < > 2.36*   GFRESTIMATED 63  --  46* 39* 27*  --  27*   < > 22*   GFRESTBLACK 73  --  53* 45* 32*  --  31*   < > 26*   ANIBAL  7.7*  --  8.5 8.5 8.1*  --  8.7   < > 9.3   MAG 1.6  --   --   --   --   --   --   --  2.1   PHOS 2.5  --   --   --   --   --   --   --  9.4*   PROTTOTAL  --   --   --  5.5* 5.2*  --  6.2*  --  7.1   ALBUMIN  --   --   --  2.1* 2.2*  --  2.8*  --  3.2*   BILITOTAL  --   --   --  0.6 0.3  --  0.6  --  0.9   ALKPHOS  --   --   --  113 115  --  147  --  163*   AST  --   --   --  217* 256*  --  213*  --  126*   ALT  --   --   --  82* 70*  --  61*  --  42    < > = values in this interval not displayed.     CBC  Recent Labs   Lab 11/30/19  0525 11/29/19  0450 11/28/19  1840 11/28/19  1746 11/28/19  1659  11/28/19  1406   WBC 11.4* 8.1 8.0  --   --   --  7.5   RBC 5.13 4.87 5.20  --   --   --  5.38*   HGB 16.5* 15.6 16.9*  --  19.4*   < > 17.8*   HCT 52.9* 53.2* 57.4*  --   --   --  60.1*   * 109* 110*  --   --   --  112*   MCH 32.2 32.0 32.5  --   --   --  33.1*   MCHC 31.2* 29.3* 29.4*  --   --   --  29.6*   RDW 15.3* 15.4* 15.4*  --   --   --  15.3*    162 147* 134*  --   --  149*    < > = values in this interval not displayed.     INR  Recent Labs   Lab 11/28/19  1406   INR 1.40*     Arterial Blood Gas  Recent Labs   Lab 12/01/19  1650 12/01/19  1135 12/01/19  0815 11/30/19  1650  11/29/19  1024   PH 7.46* 7.40 7.38 7.37   < > 7.35   PCO2 49* 53* 58* 57*   < > 57*   PO2 84 100 73* 80   < > 93   HCO3 35* 32* 34* 32*   < > 32*   O2PER 75%  --   --   --   --  Room Air    < > = values in this interval not displayed.       All cultures:  Recent Labs   Lab 11/29/19  1553 11/28/19  1612 11/28/19  1439 11/28/19  1406   CULT Heavy growth  Corynebacterium striatum  Identification obtained by MALDI-TOF mass spectrometry research use only database. Test   characteristics determined and verified by the [facility]   ([facility]) [place], [place].  Susceptibility testing not routinely done  * >100,000 colonies/mL  Escherichia coli  *  50,000 to 100,000 colonies/mL  Klebsiella pneumoniae  *   10,000 to 50,000 colonies/mL  Strain 2  Escherichia coli  * No growth after 4 days No growth after 4 days     No results found for this or any previous visit (from the past 24 hour(s)).              Billing: This patient is critically ill: Yes. Total critical care time today 40 min. This does not include time spent on procedures. Critical care interventions are mechanical ventilation.     Anthony Carmen MD  Pulmonary & Critical Care Medicine  [facility]

## 2019-12-02 NOTE — PLAN OF CARE
Neuro: Pt sedated on propofol gtt, follows commands, answers yes/no questions appropriately.  Moves all extremities equally, pupils PERRL.  CV: Tele Afib CVR, infrequent PVCs.  BP WDL  A line removed.  Resp: Remains on full vent support.  Lung sounds diminished.  Creamy/thick secretions per ETT.  GI: Diarrhea, incontinent.  Rectal tube in place.  : Grey with adequate UOP.  Skin: Skin care protocol in place.  Plan:  Continue to monitor.

## 2019-12-03 NOTE — PROGRESS NOTES
Fady's test performed on the right radial wrist and it was negative, showing collateral circulation. ABG was performed. RT will continue to follow.

## 2019-12-03 NOTE — PROGRESS NOTES
[facility] ICU RESPIRATORY NOTE     Date of Admission: 11/28/2019  Date of Intubation (most recent): 11/29/2019     Reason for Mechanical Ventilation: Resp failure     Number of Days on Mechanical Ventilation: 4     Met Criteria for Pressure Support Trial: No     Reason for No Pressure Support Trial: Pt is on Velteri full strength and PEEP 14         ABG Results:   Recent Labs   Lab 12/01/19  1650 12/01/19  1135 12/01/19  0815 11/30/19  1650  11/29/19  1024   PH 7.46* 7.40 7.38 7.37   < > 7.35   PCO2 49* 53* 58* 57*   < > 57*   PO2 84 100 73* 80   < > 93   HCO3 35* 32* 34* 32*   < > 32*   O2PER 75%  --   --   --   --  Room Air    < > = values in this interval not displayed.     Ventilation Mode: CMV/AC  (Continuous Mandatory Ventilation/ Assist Control)  FiO2 (%): 75 %  Rate Set (breaths/minute): 20 breaths/min  Tidal Volume Set (mL): 450 mL  PEEP (cm H2O): 14 cmH2O  Oxygen Concentration (%): 75 %  Resp: 18    Will continue to follow.     Zac Portillo, RT

## 2019-12-03 NOTE — PROGRESS NOTES
Neuro:Lightly sedated on Dex. Follows commands.PERRL  CV: HR WNL, BP elevated, scheduled metoprolol and PRN met given. Tele NSR  Pulmonary: Continues on full vent support, LS diminished  GI/:Good uop. Rectal tube in place with moderate amount of watery stools  Skin: Abasions, rashes. Body cleaned well and interdry placed in fold.  Lines:PICC patent and infusing. PIV SL

## 2019-12-03 NOTE — PROGRESS NOTES
[facility] ICU RESPIRATORY NOTE        Date of Admission: 11/28/2019    Date of Intubation (most recent): 11/29/19    Reason for Mechanical Ventilation: Respiratory Failure    Number of Days on Mechanical Ventilation: 5    Met Criteria for Spontaneous Breathing Trial: No    Reason for No Spontaneous Breathing Trial: PEEP 14    Significant Events Today: Veletri turned off at 1040    ABG Results:   Recent Labs   Lab 12/01/19  1650 12/01/19  1135 12/01/19  0815 11/30/19  1650  11/29/19  1024   PH 7.46* 7.40 7.38 7.37   < > 7.35   PCO2 49* 53* 58* 57*   < > 57*   PO2 84 100 73* 80   < > 93   HCO3 35* 32* 34* 32*   < > 32*   O2PER 75%  --   --   --   --  Room Air    < > = values in this interval not displayed.       Current Vent Settings: Ventilation Mode: CMV/AC  (Continuous Mandatory Ventilation/ Assist Control)  FiO2 (%): 75 %  Rate Set (breaths/minute): 26 breaths/min  Tidal Volume Set (mL): 450 mL  PEEP (cm H2O): 14 cmH2O  Oxygen Concentration (%): 75 %  Resp: 26      Skin Assessment: Intact    Plan: Continue full vent support    Lily Tyler, RT

## 2019-12-03 NOTE — PLAN OF CARE
Pt with periods tachycardia, up to 160 bpm, given ivp metoprolol x5 with some relief.  Exchanged sedation modality to precedex and dilaudid gtts, noted increase in heart rate during transition period.  Pt requiring restraints with change in sedation also, able to reach and pull out oG tube.  Replaced with post-pyloric tube feeding in right nare at 98cm.  Continues to require insulin gtt, algorithm #4.  Diuresing well with lasix q 6 hours, replacing lytes as able.

## 2019-12-03 NOTE — PROGRESS NOTES
[facility]    Cardiology Progress Note     Assessment & Plan   DIAGNOSES/ASSESSMENT/PLAN:   1.  Acute hypoxemic hypercarbic respiratory failure, requiring mechanical support.   2.  Influenza A pneumonia.   3.  Sepsis secondary to #1.   4.  Acute kidney injury and hyperkalemia.   5.  Obesity hypoventilation syndrome.   6.  Troponin elevation, likely demand ischemia from the above.   7.  Morbid obesity with a BMI of 54 kg/m2.   8.  Failure to thrive.   9.  Type 2 diabetes.   10.  Hypertension.   11.  Severe Pulm HTN - Chronic     Patient is admitted with multiple acute comorbidities -- renal failure, respiratory failure, sepsis, influenza, pneumonia and very likely has severe untreated obesity hypoventilation syndrome with chronic obstructive pulmonary disease (COPD) and active tobacco use.  She also has severe pulmonary hypertension which is likely secondary to obesity hypoventilation syndrome and her respiratory insult.  She has normal left ventricular (LV) wall motion on echo and no significant ischemic changes on ECG.  The troponin elevation is likely demand ischemia or due to acute renal failure.       1.  For now, further evaluation of the troponin elevation is not indicated. Medical management.    2.  Would continue with scheduled metoprolol and supplementation as needed.       3.  Diuretics being held due to negative 4.5 L    4.  Cardiology will be available for questions if needed     Echo 11/29/19  The visual ejection fraction is estimated at 50-55%.  No regional wall motion abnormalities noted.  Flattened septum is consistent with RV pressure/volume overload.  There is mild concentric left ventricular hypertrophy.  The right ventricle is moderate to severely dilated.  Moderately decreased right ventricular systolic function  The right atrium is moderate to severely dilated.  There is mild (1+) tricuspid regurgitation.  Trivial pericardial effusion  RVSP is likely underestimated due to poor  TR envelope.     No significant change compared to 11/1/16 report      Zackary Barrow MD    Interval History   Patient continues to be intubated.     Physical Exam   Temp: 101.2  F (38.4  C) Temp src: Axillary BP: (!) 133/90 Pulse: 107 Heart Rate: 109 Resp: 18 SpO2: 96 % O2 Device: Mechanical Ventilator    Vitals:    12/01/19 0615 12/02/19 0700 12/03/19 0200   Weight: (!) 181.4 kg (399 lb 14.4 oz) (!) 181.3 kg (399 lb 12.8 oz) (!) 172.4 kg (380 lb)     Vital Signs with Ranges  Temp:  [98.6  F (37  C)-101.2  F (38.4  C)] 101.2  F (38.4  C)  Pulse:  [] 107  Heart Rate:  [] 109  Resp:  [0-27] 18  BP: (132-178)/() 133/90  FiO2 (%):  [75 %] 75 %  SpO2:  [90 %-96 %] 96 %  I/O last 3 completed shifts:  In: 1364.09 [I.V.:884.09; NG/GT:180]  Out: 5875 [Urine:5775; Stool:100]    GENERAL APPEARANCE: Intubated  SKIN: Inspection of the skin reveals no rashes, ulcerations, warm, dry  NECK: Supple and symmetric.   Unable to assess JVP  LUNGS: Mechanical lung sounds noted from the ventilator  CARDIOVASCULAR: Regular rhythm, S1-S2 audible, difficult auscultation due to body habitus, no ambrocio murmurs  ABDOMEN: Soft  EXTREMITIES: Possibly 1+ edema.  NEUROLOGIC:  Intubated sedated    Medications     dexmedetomidine 0.8 mcg/kg/hr (12/03/19 1130)     IV fluid REPLACEMENT ONLY       HYDROmorphone 0.3 mg/hr (12/03/19 0744)     insulin (regular) 2 Units/hr (12/03/19 1313)     sodium chloride 10 mL/hr at 12/03/19 0818       chlorhexidine  15 mL Mouth/Throat Q12H     heparin ANTICOAGULANT  5,000 Units Subcutaneous Q8H     ipratropium - albuterol 0.5 mg/2.5 mg/3 mL  3 mL Nebulization Q4H While awake     methylPREDNISolone  40 mg Intravenous Q24H     metoprolol tartrate  25 mg Per NG tube TID     multivitamins w/minerals  15 mL Per Feeding Tube Daily     oseltamivir  150 mg Oral or Feeding Tube BID     pantoprazole  40 mg Oral Daily    Or     pantoprazole  40 mg Oral or Feeding Tube Daily     sodium chloride (PF)  10  mL Intracatheter Q8H       Data   Results for orders placed or performed during the hospital encounter of 11/28/19 (from the past 24 hour(s))   Glucose by meter   Result Value Ref Range    Glucose 97 70 - 99 mg/dL   XR Abdomen Port 1 View    Narrative    XR PORTABLE ABDOMEN ONE VIEW   12/2/2019 5:06 PM     HISTORY: Tube feed placement.    COMPARISON: Abdominal x-ray on 11/29/2019.      Impression    IMPRESSION: Single portable view of the abdomen was obtained. Feeding  tube distal tip projects over the distal duodenum/proximal jejunum.    CHARLES FARIAS MD   XR Abdomen Port 1 View    Narrative    ABDOMEN PORTABLE ONE VIEW   12/2/2019 5:08 PM     HISTORY: Feeding tube placement.    COMPARISON: Abdominal x-ray on same day earlier.      Impression    IMPRESSION: Single portable view of the abdomen was obtained. Feeding  tube distal tip projects over proximal duodenum, retracted as compared  to same day earlier exam.     CHARLES FARIAS MD   Glucose by meter   Result Value Ref Range    Glucose 154 (H) 70 - 99 mg/dL   Glucose by meter   Result Value Ref Range    Glucose 111 (H) 70 - 99 mg/dL   Glucose by meter   Result Value Ref Range    Glucose 125 (H) 70 - 99 mg/dL   Glucose by meter   Result Value Ref Range    Glucose 118 (H) 70 - 99 mg/dL   Basic metabolic panel (AM Draw)   Result Value Ref Range    Sodium 149 (H) 133 - 144 mmol/L    Potassium 3.2 (L) 3.4 - 5.3 mmol/L    Chloride 105 94 - 109 mmol/L    Carbon Dioxide 37 (H) 20 - 32 mmol/L    Anion Gap 7 3 - 14 mmol/L    Glucose 100 (H) 70 - 99 mg/dL    Urea Nitrogen 46 (H) 7 - 30 mg/dL    Creatinine 0.87 0.52 - 1.04 mg/dL    GFR Estimate 74 >60 mL/min/[1.73_m2]    GFR Estimate If Black 85 >60 mL/min/[1.73_m2]    Calcium 7.6 (L) 8.5 - 10.1 mg/dL   CBC Differential (AM Draw)   Result Value Ref Range    WBC 8.1 4.0 - 11.0 10e9/L    RBC Count 5.33 (H) 3.8 - 5.2 10e12/L    Hemoglobin 17.2 (H) 11.7 - 15.7 g/dL    Hematocrit 55.2 (H) 35.0 - 47.0 %     (H) 78 -  100 fl    MCH 32.3 26.5 - 33.0 pg    MCHC 31.2 (L) 31.5 - 36.5 g/dL    RDW 15.3 (H) 10.0 - 15.0 %    Platelet Count 98 (L) 150 - 450 10e9/L    Diff Method Automated Method     % Neutrophils 72.8 %    % Lymphocytes 12.9 %    % Monocytes 12.2 %    % Eosinophils 0.1 %    % Basophils 0.2 %    % Immature Granulocytes 1.8 %    Nucleated RBCs 2 (H) 0 /100    Absolute Neutrophil 5.9 1.6 - 8.3 10e9/L    Absolute Lymphocytes 1.1 0.8 - 5.3 10e9/L    Absolute Monocytes 1.0 0.0 - 1.3 10e9/L    Absolute Eosinophils 0.0 0.0 - 0.7 10e9/L    Absolute Basophils 0.0 0.0 - 0.2 10e9/L    Abs Immature Granulocytes 0.2 0 - 0.4 10e9/L    Absolute Nucleated RBC 0.2    Hepatic panel   Result Value Ref Range    Bilirubin Direct 0.2 0.0 - 0.2 mg/dL    Bilirubin Total 0.5 0.2 - 1.3 mg/dL    Albumin 2.1 (L) 3.4 - 5.0 g/dL    Protein Total 5.2 (L) 6.8 - 8.8 g/dL    Alkaline Phosphatase 76 40 - 150 U/L    ALT 33 0 - 50 U/L    AST 30 0 - 45 U/L   Glucose by meter   Result Value Ref Range    Glucose 66 (L) 70 - 99 mg/dL   XR Chest Port 1 View    Narrative    CHEST ONE VIEW PORTABLE   12/3/2019 6:00 AM     HISTORY: Hypoxemic respiratory failure.    COMPARISON: 11/29/2019.      Impression    IMPRESSION: Feeding tube extends below the left hemidiaphragm. There  are interstitial and groundglass opacities with lower lobe  predominance in both lungs, suggestive of CHF. The heart is enlarged  and there are small pleural effusions. No pneumothorax. Tip of the  right-sided PICC line is in the mid to distal SVC, similar to prior.    RACH BONILLA MD   Magnesium (AM Draw)   Result Value Ref Range    Magnesium 1.2 (L) 1.6 - 2.3 mg/dL   Potassium   Result Value Ref Range    Potassium 2.5 (LL) 3.4 - 5.3 mmol/L   Glucose by meter   Result Value Ref Range    Glucose 238 (H) 70 - 99 mg/dL   Blood gas venous with oxyhemoglobin (PCU Collect TBD)   Result Value Ref Range    Ph Venous 7.46 (H) 7.32 - 7.43 pH    PCO2 Venous 57 (H) 40 - 50 mm Hg    PO2 Venous 34 25 -  47 mm Hg    Bicarbonate Venous 41 (H) 21 - 28 mmol/L    Oxyhemoglobin Venous 62 %    Base Excess Venous 12.9 mmol/L   Glucose by meter   Result Value Ref Range    Glucose 290 (H) 70 - 99 mg/dL   Glucose by meter   Result Value Ref Range    Glucose 221 (H) 70 - 99 mg/dL   Blood gas arterial with oxyhemoglobin (PCU Collect TBD)   Result Value Ref Range    pH Arterial 7.45 7.35 - 7.45 pH    pCO2 Arterial 56 (H) 35 - 45 mm Hg    pO2 Arterial 87 80 - 105 mm Hg    Bicarbonate Arterial 39 (H) 21 - 28 mmol/L    FIO2 75     Oxyhemoglobin Arterial 95 92 - 100 %    Base Excess Art 11.6 mmol/L   Glucose by meter   Result Value Ref Range    Glucose 148 (H) 70 - 99 mg/dL

## 2019-12-03 NOTE — PROGRESS NOTES
Critical Care Progress Note        12/03/2019    Name: Hanna Valle MRN#: [id number]   Age: 59 year old YOB: 1960     [facility]    [facility] DAY #5             Problem List:   Influenza A    HFpEF  Pulmonary hypertension with RV failure  Morbid obesity and likely BEVERLEY/OHS         Summary/Hospital Course:     59 year old female presents with hypoxemic and hypercapnic respiratory failure from influenza A.  Required mechanical ventilation and treatment with veletri.       Assessment and plan :     Hanna Valle IS a 59 year old female admitted on 11/28/2019 for acute on chronic hypoxemic respiratory failure in the setting of Influenza A and community acquired pneumonia.     My assessment and plan by system for this patient is as follows:    Neurology/Psychiatry:   1. History depression - not on any home medications.   2. History alcohol use disorder   3. Toxic metabolic encephalopathy secondary to respiratory failure, sepsis  4. ICU sedation    Plan:  - precedex and dilaudid for sedation and pain.     Cardiovascular:   1. HFpEF (EF yesterday 50-55% on echo)  2. Pulmonary hypertension with RV failure: net negative 4.5 liters yesterday     Plan  - hold lasix today. Check BMP, venous oxyhemoglobin, trend CVP    Pulmonary/Ventilator Management:   1. Acute on chronic hypoxemic respiratory failure  2. CAP   3. Influenza A   4. Likely BEVERLEY   5. Bilateral pleural effusions      Plan  - Mechanical ventilation. Decreased RR from 26 to 18.   - stop Veletri   - 40 solumedrol daily. Will likely decrease tomorrow.     GI/Nutrition :   1. Morbid obesity   2. Transaminitis: trending down and stable   3. Enteral feedings    Plan  - Tube feeds started yesterday  - PPI   - Trend LFTs as needed      Renal/Fluids/Electrolytes:   1. ORLY in setting of septic shock, improving  2. Volume overload   3. Hypernatremia - due to free water deficit from loop diuretics  4. Hypokalemia -from loop diuretics.     Plan  - monitor  function and electrolytes as needed with replacement per ICU protocols.  - generally avoid nephrotoxic agents such as NSAID, IV contrast unless specifically required  - adjust medications as needed for renal clearance  - follow I/O's as appropriate.  - hold diuresis  - increase free water flush to 120 mL q 4    Infectious Disease:   1. Influenza A   2. Community acquired pneumonia   3. E. Coli and K.pneumoniae UTI  4. Chronic cutaneous candida infection, groin     Plan  - s/p 5 days abx for possible bacterial pneumonia  - Continue Tamiflu for 5 days total    Endocrine:   1. Stress induced hyperglycemia  2. Hx of DMII     Plan  - ICU insulin protocol, goal sugar <180  - Switch to medium intensity sliding scale     Hematology/Oncology:   1. Leukocytosis: in setting of critical illness   2. Elevated H/H    Plan  - Continue to monitor     MSK/Rheum:  1. Deconditioning     Plan  - PT/OT when able     Skin:   1. Candidal infection    Plan:   -Nystatin cream     IV/Access:   1. Venous access: PIV and RUE PICC    2. Arterial access: none    Plan  - central access required and necessary    ICU Prophylaxis:   1. DVT: Hep Subq  2. VAP: HOB 30 degrees, chlorhexidine rinse  3. Stress Ulcer: PPI  4. Restraints: Nonviolent soft two point restraints required and necessary for patient safety and continued cares and good effect as patient continues to pull at necessary lines, tubes despite education and distraction. Will readdress daily.   5. Wound care - per unit routine   6. Feeding - Tube feeds started 11/29  7. Family Update: not yet today  8. Disposition - ICU    Key goals for next 24 hours:   1. De-escalate antibiotics  2. Continue diuresis         Interim History/Overnight Events:     Good urine output yesterday. Had tachycardia, and given 5 mg metoprolol IV.          Key Medications:       chlorhexidine  15 mL Mouth/Throat Q12H     heparin ANTICOAGULANT  5,000 Units Subcutaneous Q8H     ipratropium - albuterol 0.5 mg/2.5 mg/3  mL  3 mL Nebulization Q4H While awake     methylPREDNISolone  40 mg Intravenous Q24H     metoprolol tartrate  25 mg Per NG tube TID     multivitamins w/minerals  15 mL Per Feeding Tube Daily     oseltamivir  150 mg Oral or Feeding Tube BID     pantoprazole  40 mg Oral Daily    Or     pantoprazole  40 mg Oral or Feeding Tube Daily     protein modular  1 packet Per Feeding Tube 7x Daily     sodium chloride (PF)  10 mL Intracatheter Q8H       dexmedetomidine 0.8 mcg/kg/hr (12/03/19 0842)     IV fluid REPLACEMENT ONLY       HYDROmorphone 0.3 mg/hr (12/03/19 0744)     insulin (regular) 8 Units/hr (12/03/19 1020)     sodium chloride 10 mL/hr at 12/03/19 0818               Physical Examination:   Temp:  [98.6  F (37  C)-99.9  F (37.7  C)] 99.9  F (37.7  C)  Pulse:  [] 104  Heart Rate:  [] 94  Resp:  [0-27] 26  BP: (126-178)/() 144/100  FiO2 (%):  [65 %-75 %] 75 %  SpO2:  [90 %-96 %] 95 %    Intake/Output Summary (Last 24 hours) at 11/30/2019 1105  Last data filed at 11/30/2019 0800  Gross per 24 hour   Intake 1909.15 ml   Output 1130 ml   Net 779.15 ml     Wt Readings from Last 4 Encounters:   12/03/19 (!) 172.4 kg (380 lb)   09/17/18 136.1 kg (300 lb)   11/25/16 (!) 142.4 kg (314 lb)   10/31/16 (!) 146.1 kg (322 lb)     BP - Mean:  [100-147] 111  CVP:  [3 mmHg-24 mmHg] 11 mmHg  Ventilation Mode: CMV/AC  (Continuous Mandatory Ventilation/ Assist Control)  FiO2 (%): 75 %  Rate Set (breaths/minute): 26 breaths/min  Tidal Volume Set (mL): 450 mL  PEEP (cm H2O): 14 cmH2O  Oxygen Concentration (%): 75 %  Resp: 26    Recent Labs   Lab 12/01/19  1650 12/01/19  1135 12/01/19  0815 11/30/19  1650  11/29/19  1024   PH 7.46* 7.40 7.38 7.37   < > 7.35   PCO2 49* 53* 58* 57*   < > 57*   PO2 84 100 73* 80   < > 93   HCO3 35* 32* 34* 32*   < > 32*   O2PER 75%  --   --   --   --  Room Air    < > = values in this interval not displayed.       General:   Following basic commands   Neurologic:   Sedation: lightly sedated  and awakable   HEENT:   Head is atraumatic      Lungs:   CTAB   Cardiovascular:   Irregular rhythm. tachycardic  Normal S1,S2, and no gallop, rub or murmur   Abdomen:   Distended:  No.   Bowel sound present and normal: Yes .   Soft: Yes . Tender: No.   Rebound:tendeness or guarding present No   Extremities:   Clubbing present: No,   Edema present: Yes ,   Acrocyanosis present: No,   Mottling present: No,   Normal capillary refill: Yes    Skin:   Warm, dry.  Excoriated rash on left leg.    Lines/Drain:    Central line present: Yes   Arterial line present: no  External ventricular Drain present: No  Chest tube present: No  LUCY present: No  PEG present: No            Data:   All data and imaging reviewed.     ROUTINE ICU LABS (Last four results)  CMP  Recent Labs   Lab 12/03/19  0700 12/03/19  0530 12/02/19  1310 12/02/19  1214 12/02/19  0600  11/30/19  0525 11/29/19  0450  11/28/19  1840  11/28/19  1406   NA  --  149* 144 Canceled, Test credited 139   < > 136 138  --  139   < > 138   POTASSIUM 2.5* 3.2* 3.1* Canceled, Test credited 3.8   < > 5.5* 4.9   < > 5.4*   < > 7.1*   CHLORIDE  --  105 102 Canceled, Test credited 100   < > 101 104  --  103   < > 102   CO2  --  37* 39* Canceled, Test credited 35*   < > 31 30  --  28   < > 31   ANIONGAP  --  7 3 Canceled, Test credited 4   < > 4 4  --  8   < > 5   GLC  --  100* 139* Canceled, Test credited 100*   < > 242* 224*  --  128*   < > 78   BUN  --  46* 45* Canceled, Test credited 49*   < > 52* 50*  --  45*   < > 43*   CR  --  0.87 0.95 Canceled, Test credited 0.99   < > 1.47* 1.97*  --  2.02*   < > 2.36*   GFRESTIMATED  --  74 66 Canceled, Test credited 63   < > 39* 27*  --  27*   < > 22*   GFRESTBLACK  --  85 77 Canceled, Test credited 73   < > 45* 32*  --  31*   < > 26*   ANIBAL  --  7.6* 8.0* Canceled, Test credited 7.7*   < > 8.5 8.1*  --  8.7   < > 9.3   MAG 1.2*  --  1.9 Canceled, Test credited 1.6  --   --   --   --   --   --  2.1   PHOS  --   --   --   --  2.5  --   --    --   --   --   --  9.4*   PROTTOTAL  --  5.2*  --   --   --   --  5.5* 5.2*  --  6.2*  --  7.1   ALBUMIN  --  2.1*  --   --   --   --  2.1* 2.2*  --  2.8*  --  3.2*   BILITOTAL  --  0.5  --   --   --   --  0.6 0.3  --  0.6  --  0.9   ALKPHOS  --  76  --   --   --   --  113 115  --  147  --  163*   AST  --  30  --   --   --   --  217* 256*  --  213*  --  126*   ALT  --  33  --   --   --   --  82* 70*  --  61*  --  42    < > = values in this interval not displayed.     CBC  Recent Labs   Lab 12/03/19  0530 11/30/19  0525 11/29/19  0450 11/28/19  1840   WBC 8.1 11.4* 8.1 8.0   RBC 5.33* 5.13 4.87 5.20   HGB 17.2* 16.5* 15.6 16.9*   HCT 55.2* 52.9* 53.2* 57.4*   * 103* 109* 110*   MCH 32.3 32.2 32.0 32.5   MCHC 31.2* 31.2* 29.3* 29.4*   RDW 15.3* 15.3* 15.4* 15.4*   PLT 98* 161 162 147*     INR  Recent Labs   Lab 11/28/19  1406   INR 1.40*     Arterial Blood Gas  Recent Labs   Lab 12/01/19  1650 12/01/19  1135 12/01/19  0815 11/30/19  1650  11/29/19  1024   PH 7.46* 7.40 7.38 7.37   < > 7.35   PCO2 49* 53* 58* 57*   < > 57*   PO2 84 100 73* 80   < > 93   HCO3 35* 32* 34* 32*   < > 32*   O2PER 75%  --   --   --   --  Room Air    < > = values in this interval not displayed.       All cultures:  Recent Labs   Lab 11/29/19  1553 11/28/19  1612 11/28/19  1439 11/28/19  1406   CULT Heavy growth  Corynebacterium striatum  Identification obtained by MALDI-TOF mass spectrometry research use only database. Test   characteristics determined and verified by the [facility]   ([facility]) [place], [place].  Susceptibility testing not routinely done  * >100,000 colonies/mL  Escherichia coli  *  50,000 to 100,000 colonies/mL  Klebsiella pneumoniae  *  10,000 to 50,000 colonies/mL  Strain 2  Escherichia coli  * No growth after 5 days No growth after 5 days     Recent Results (from the past 24 hour(s))   XR Abdomen Port 1 View    Narrative    XR PORTABLE ABDOMEN ONE VIEW   12/2/2019 5:06 PM     HISTORY:  Tube feed placement.    COMPARISON: Abdominal x-ray on 11/29/2019.      Impression    IMPRESSION: Single portable view of the abdomen was obtained. Feeding  tube distal tip projects over the distal duodenum/proximal jejunum.    CHARLSE FARIAS MD   XR Abdomen Port 1 View    Narrative    ABDOMEN PORTABLE ONE VIEW   12/2/2019 5:08 PM     HISTORY: Feeding tube placement.    COMPARISON: Abdominal x-ray on same day earlier.      Impression    IMPRESSION: Single portable view of the abdomen was obtained. Feeding  tube distal tip projects over proximal duodenum, retracted as compared  to same day earlier exam.     CHARLES FARIAS MD   XR Chest Port 1 View    Narrative    CHEST ONE VIEW PORTABLE   12/3/2019 6:00 AM     HISTORY: Hypoxemic respiratory failure.    COMPARISON: 11/29/2019.      Impression    IMPRESSION: Feeding tube extends below the left hemidiaphragm. There  are interstitial and groundglass opacities with lower lobe  predominance in both lungs, suggestive of CHF. The heart is enlarged  and there are small pleural effusions. No pneumothorax. Tip of the  right-sided PICC line is in the mid to distal SVC, similar to prior.    RACH BONILLA MD                 Billing: This patient is critically ill: Yes. Total critical care time today 45 min. This does not include time spent on procedures. Critical care interventions are managing hypoxemic respiratory failure with mechanical ventilation    Anthony Carmen MD  Pulmonary & Critical Care Medicine  [facility]

## 2019-12-03 NOTE — PROGRESS NOTES
CLINICAL NUTRITION SERVICES - REASSESSMENT NOTE      Recommendations Ordered by Registered Dietitian (RD): Increase goal TF regimen to Peptamen Intense VHP at 70 mL/hr to provide:  1680 kcal (24 kcal/kg), 155 g protein (2.1 g/kg), 128 g CHO, 7 g fiber, 1411 mL H2O  Discontinue the ProSource (no longer needed)   Malnutrition:   % Weight Loss:  None noted  % Intake:  Decreased intake does not meet criteria for malnutrition - meeting 2/3 energy needs via TF, was meeting needs yesterday while on TF + Propofol   Subcutaneous Fat Loss:  None observed  Muscle Loss:  None observed  Fluid Retention:  Moderate 3+ in lower extremities     Malnutrition Diagnosis: Patient does not meet two of the above criteria necessary for diagnosing malnutrition       EVALUATION OF PROGRESS TOWARD GOALS   Diet:  NPO on vent     Nutrition Support:  Patient started on TF 11/29, advanced to goal on 11/30, and continues as follows ~    Nutrition Support Enteral:  Type of Feeding Tube: Tip in proximal duodenum   Enteral Frequency:  Continuous  Enteral Regimen: Peptamen Intense VHP at 30 mL/hr  Total Enteral Provisions: 720 kcal, 66 g protein, 55 g CHO, 3 g fiber, 605 mL H2O  Free Water Flush: 60 mL every 4 hours   Patient also receiving ProSource 1 pkt 7x per day:  280 kcal, 77 g protein  Total (TF + ProSource):  1000 kcal (14 kcal/kg and 65% needs), 143 g protein (2 g/kg)      Intake/Tolerance:    Na 149 (H)  K 3.2 (L)  Stool 100 mL yesterday and BM x 1 on 12/1  BGM  on Insulin drip   I/O 1364/5875, weight 172.4 kg (up 0.6 kg from admit), noted patient with 3+ moderate edema in extremities - IV Lasix         ASSESSED NUTRITION NEEDS:  Dosing Weight 70.5 kg (IBW)  Estimated Energy Needs: 0705-1660 kcals (22-25 kcal/kg)  Justification: obese  Estimated Protein Needs: 140-175 grams protein (2-2.5 g pro/Kg)  Justification: hypercatabolism with critical illness and obesity guidelines       NEW FINDINGS:   WOCN eval on 11/29 indicated no Pressure  Injuries   Receiving Certavite daily via FT     Propofol off on 12/3  Patient pulled OG last night and a P/P feeding tube was placed with tip in the proximal duodenum     Family still not present and therefore unable to obtain a nutrition history  No fat or muscle wasting noted on physical exam     Previous Goals (11/29):   Patient will meet % needs via TF + ProSource + Propofol   Evaluation: Not met as Propofol now off     Previous Nutrition Diagnosis (11/29):   Inadequate protein-energy intake related to NPO on vent, TF to start as evidenced by meeting 0% protein and 50% energy needs from Propofol   Evaluation: Improving      MALNUTRITION  % Weight Loss:  None noted  % Intake:  Decreased intake does not meet criteria for malnutrition - meeting 2/3 energy needs via TF, was meeting needs yesterday while on TF + Propofol   Subcutaneous Fat Loss:  None observed  Muscle Loss:  None observed  Fluid Retention:  Moderate 3+ in lower extremities     Malnutrition Diagnosis: Patient does not meet two of the above criteria necessary for diagnosing malnutrition    CURRENT NUTRITION DIAGNOSIS  Inadequate energy intake related to TF at 30 mL/hr (continues on 1.0 kcal/mL formula, Propofol off) as evidenced by meeting ~2/3 energy needs via current regimen     INTERVENTIONS  Recommendations / Nutrition Prescription  Increase goal TF regimen to Peptamen Intense VHP at 70 mL/hr to provide:  1680 kcal (24 kcal/kg), 155 g protein (2.1 g/kg), 128 g CHO, 7 g fiber, 1411 mL H2O  Discontinue the ProSource (no longer needed)    Implementation  EN Composition:  Orders written to increase TF rate to 50 mL/hr now and then after 12 hours advance to goal  Medical Food Supplement:  Discontinue ProSource as above  Collaboration and Referral of Nutrition care:  Patient discussed today during interdisciplinary bedside rounds     Goals  TF will meet % needs     MONITORING AND EVALUATION:  Progress towards goals will be monitored and  evaluated per protocol and Practice Guidelines    Sofia Morales RD, LD, CNSC   Clinical Dietitian - [facility]

## 2019-12-03 NOTE — PROGRESS NOTES
Spiritual Health    SH visited Pt per length of stay. Pt is intubated and unconscious, no family is present.     SH will continue to follow Pt via interdisciplinary rounds.     Harmony Stanton  Chaplain Resident

## 2019-12-03 NOTE — PROGRESS NOTES
[facility] ICU RESPIRATORY NOTE    Date of Admission: 11/28/2019  Date of Intubation (most recent): 11/29/2019     Reason for Mechanical Ventilation: Resp failure     Number of Days on Mechanical Ventilation: 3     Met Criteria for Pressure Support Trial: No     Reason for No Pressure Support Trial: Pt is on Velteri full strenght and PEEP 14 and high Fi02      ABG Results:   Recent Labs   Lab 12/01/19  1650 12/01/19  1135 12/01/19  0815 11/30/19  1650  11/29/19  1024   PH 7.46* 7.40 7.38 7.37   < > 7.35   PCO2 49* 53* 58* 57*   < > 57*   PO2 84 100 73* 80   < > 93   HCO3 35* 32* 34* 32*   < > 32*   O2PER 75%  --   --   --   --  Room Air    < > = values in this interval not displayed.       Current Vent Settings: Ventilation Mode: CMV/AC  (Continuous Mandatory Ventilation/ Assist Control)  FiO2 (%): 75 %  Rate Set (breaths/minute): 26 breaths/min  Tidal Volume Set (mL): 450 mL  PEEP (cm H2O): 14 cmH2O  Oxygen Concentration (%): 75 %  Resp: 20        Plan:Conitnue full ventilator support.    RT Leigha  12/2/2019

## 2019-12-04 NOTE — PLAN OF CARE
Neuro: pt followed simple commands only once (squeezed hand, wiggled toes), and nodded her head to one question, otherwise not responsive but withdraws to pain and opens eyes to voice. Dilaudid and precedex gtts infusing  CV: afib, BP stable, generalized 2-3+ edema  Pulm: lungs course, scant secretions  GI/: good UOP, 500 mL from FMS  Skin: R ear blanchable redness, L breast fold open and excoriated, coccyx pink but intact, heels intact  Lines: insulin gtt

## 2019-12-04 NOTE — PHARMACY-VANCOMYCIN DOSING SERVICE
Pharmacy Vancomycin Initial Note  Date of Service 2019  Patient's  1960  59 year old, female    Indication: Ventilator-Associated Pneumonia    Current estimated CrCl = Estimated Creatinine Clearance: 118.7 mL/min (based on SCr of 0.91 mg/dL).    Creatinine for last 3 days  2019:  6:00 AM Creatinine 0.99 mg/dL; 12:14 PM Creatinine Canceled, Test credited mg/dL;  1:10 PM Creatinine 0.95 mg/dL  12/3/2019:  5:30 AM Creatinine 0.87 mg/dL;  1:00 PM Creatinine 0.94 mg/dL;  4:00 PM Creatinine 0.78 mg/dL  2019:  5:00 AM Creatinine 0.91 mg/dL    Recent Vancomycin Level(s) for last 3 days  No results found for requested labs within last 72 hours.      Vancomycin IV Administrations (past 72 hours)      No vancomycin orders with administrations in past 72 hours.                Nephrotoxins and other renal medications (From now, onward)    Start     Dose/Rate Route Frequency Ordered Stop    19 1330  vancomycin (VANCOCIN) 2,000 mg in sodium chloride 0.9 % 500 mL intermittent infusion      2,000 mg  over 2 Hours Intravenous EVERY 8 HOURS 19 1322            Contrast Orders - past 72 hours (72h ago, onward)    None                Plan:  1.  Start vancomycin  2000 mg IV q8h.   2.  Goal Trough Level: 15-20 mg/L   3.  Pharmacy will check trough levels as appropriate in 1-3 Days.    4. Serum creatinine levels will be ordered daily for the first week of therapy and at least twice weekly for subsequent weeks.    5. [place] method utilized to dose vancomycin therapy: Method 1    Missy Narvaez, PharmD

## 2019-12-04 NOTE — PROCEDURES
Procedure:   Bronchoscopy with bronchial aspirate        Indication:   Fever        Consent:   Obtained from the family  Risks, benefits and alternatives discussed. Risks include bleeding, infection, respiratory failure, arrhythmia    The  is in agreement to proceed with the procedure.       Pre-medication:   The patient is on mechanical ventilation and on precedex and dilaudid   Lidocaine 1%: 8 mL through the ETT           Procedure Summary:   Time out was performed.   The bronchoscope inserted through the ETT    Airway examination:  Exam of trachea and bronchus of the right and left bronchial tree to the sub-segmental level revealed small amount of purulent secretions welling up from the RLL. The take off to the LLL was narrowed and could not be entered with the bronchoscope. No secretions were seen in the LLL.    Procedure:  Bronchial aspirate performed. Fluid sent for microbiologic analysis.     The patient tolerated the procedure well without undue discomfort, hypotension or arrhythmia. The procedure was performed in the ICU and vital sign parameters were monitored.        Complications:   No immediate complications.  Sample sent for   -microbiology.    This procedure is performed by Anthony Carmen MD

## 2019-12-04 NOTE — PLAN OF CARE
Pt weaned on day shift from FiO2 75 to 65%, vent rate 26 to 18 and veletri discontinued. SpO2 gradually downtrending but RR stable at 21 so now increasing FiO2 back to 75%. Tele: A-flutter with mild tachycardia, metoprolol dose increased. Qlnr=329.8F, trending upwards, blood cultures drawn and UC/sputum samples collected. New noted area of redness over R breast with moisture rash under breast fold, skin cares performed. Insulin drip adjusted upwards to algorithm #4. Diuresing well. Large watery stool output to rectal tube. Soft restraints continued d/t pulling at tubes.

## 2019-12-04 NOTE — PROGRESS NOTES
Critical Care Progress Note        12/04/2019    Name: Hanna Valle MRN#: [id number]   Age: 59 year old YOB: 1960     Hsptl Day# 6  ICU DAY #6    MV DAY #6             Problem List:   Influenza A    HFpEF  Pulmonary hypertension with RV failure  Morbid obesity and likely BEVERLEY/OHS  Fever         Summary/Hospital Course:     59 year old female presents with hypoxemic and hypercapnic respiratory failure from influenza A.  Required mechanical ventilation and treatment with veletri.       Assessment and plan :     Hanna Valle IS a 59 year old female admitted on 11/28/2019 for acute on chronic hypoxemic respiratory failure in the setting of Influenza A and community acquired pneumonia.     My assessment and plan by system for this patient is as follows:    Neurology/Psychiatry:   1. History depression - not on any home medications.   2. History alcohol use disorder   3. Toxic metabolic encephalopathy secondary to respiratory failure, sepsis  4. ICU sedation    Plan:  - precedex and dilaudid for sedation and pain.  - sedation holiday after bronch today.      Cardiovascular:   1. HFpEF (EF yesterday 50-55% on echo)  2. Pulmonary hypertension with RV failure  3. Atrial Fibrillation: has not converted.       Plan  - continue diuresis and CVP monitoring.   - Will start her on heparin.  If she does not convert on her own with metoprolol,  may cardiovert her after RAFAEL visualizaiton.     Pulmonary/Ventilator Management:   1. Acute on chronic hypoxemic respiratory failure  2. CAP   3. Influenza A   4. Likely BEVERLEY   5. Bilateral pleural effusions      Plan  - Mechanical ventilation. Decreased RR from 26 to 18.   - change solumedrol to prednisone 40  - bronchoscopy today for fever work up.    - continue diuresis  - change ventilator so that we can use esophageal pressure monitoring to guide PEEP.     GI/Nutrition :   1. Morbid obesity     Plan  - Tube feeds s  - PPI     Renal/Fluids/Electrolytes:   1. ORLY in  setting of septic shock, improving  2. Volume overload   3. Hypernatremia - due to free water deficit from loop diuretics  4. Hypokalemia -from loop diuretics.     Plan  - monitor function and electrolytes as needed with replacement per ICU protocols.  - generally avoid nephrotoxic agents such as NSAID, IV contrast unless specifically required  - adjust medications as needed for renal clearance  - follow I/O's as appropriate.  - increase free water flush to 120 mL q 4  - continue diuresis with diuril.     Infectious Disease:   1. Influenza A   2. Community acquired pneumonia   3. E. Coli and K.pneumoniae UTI  4. Chronic cutaneous candida infection, groin   5. Fever: pneumonia vs soft tissue infection.     Plan  - s/p 5 days abx for possible bacterial pneumonia  - Continue Tamiflu for 5 days total  - bronchoscopy today   - on ceftriaxone, but may need to broaden.       Endocrine:   1. Stress induced hyperglycemia  2. Hx of DMII     Plan  - ICU insulin protocol, goal sugar <180  - Switch to medium intensity sliding scale     Hematology/Oncology:   1. Elevated H/H: suspect related to chronic hypoxemia.      Plan  - Continue to monitor     MSK/Rheum:  1. Deconditioning     Plan  - PT/OT when able     Skin:   1. Candidal infection    Plan:   -Nystatin cream     IV/Access:   1. Venous access: PIV and RUE PICC    2. Arterial access: none    Plan  - central access required and necessary    ICU Prophylaxis:   1. DVT: Hep Subq  2. VAP: HOB 30 degrees, chlorhexidine rinse  3. Stress Ulcer: PPI  4. Restraints: Nonviolent soft two point restraints required and necessary for patient safety and continued cares and good effect as patient continues to pull at necessary lines, tubes despite education and distraction. Will readdress daily.   5. Wound care - per unit routine   6. Feeding - Tube feeds started 11/29  7. Family Update: not yet today  8. Disposition - ICU    Key goals for next 24 hours:   1. De-escalate antibiotics  2.  Continue diuresis         Interim History/Overnight Events:     Good urine output yesterday. Had tachycardia, and given 5 mg metoprolol IV.          Key Medications:       alteplase  2 mg Intravenous Once     cefTRIAXone  2 g Intravenous Q24H     chlorhexidine  15 mL Mouth/Throat Q12H     chlorothiazide  500 mg Intravenous Q12H     heparin ANTICOAGULANT  5,000 Units Subcutaneous Q8H     ipratropium - albuterol 0.5 mg/2.5 mg/3 mL  3 mL Nebulization Q4H While awake     metoprolol tartrate  50 mg Per NG tube BID     multivitamins w/minerals  15 mL Per Feeding Tube Daily     pantoprazole  40 mg Oral Daily    Or     pantoprazole  40 mg Oral or Feeding Tube Daily     [START ON 12/5/2019] predniSONE  40 mg Oral Daily     sodium chloride (PF)  10 mL Intracatheter Q8H       dexmedetomidine 0.8 mcg/kg/hr (12/04/19 0629)     IV fluid REPLACEMENT ONLY       HYDROmorphone 0.3 mg/hr (12/03/19 1919)     insulin (regular) 6 Units/hr (12/04/19 0803)     sodium chloride 20 mL/hr at 12/03/19 1922     sodium chloride Stopped (12/03/19 1923)               Physical Examination:   Temp:  [101.2  F (38.4  C)-102.7  F (39.3  C)] 102.7  F (39.3  C)  Pulse:  [] 113  Heart Rate:  [] 128  Resp:  [11-26] 26  BP: (106-154)/() 152/80  FiO2 (%):  [65 %-75 %] 75 %  SpO2:  [89 %-98 %] 96 %    Intake/Output Summary (Last 24 hours) at 11/30/2019 1105  Last data filed at 11/30/2019 0800  Gross per 24 hour   Intake 1909.15 ml   Output 1130 ml   Net 779.15 ml     Wt Readings from Last 4 Encounters:   12/04/19 (!) 169.2 kg (373 lb)   09/17/18 136.1 kg (300 lb)   11/25/16 (!) 142.4 kg (314 lb)   10/31/16 (!) 146.1 kg (322 lb)     BP - Mean:  [] 103  CVP:  [5 mmHg-242 mmHg] 14 mmHg  Ventilation Mode: CMV/AC  (Continuous Mandatory Ventilation/ Assist Control)  FiO2 (%): 75 %  Rate Set (breaths/minute): 18 breaths/min  Tidal Volume Set (mL): 450 mL  PEEP (cm H2O): 14 cmH2O  Oxygen Concentration (%): 75 %  Resp: 26    Recent Labs   Lab  12/03/19  1208 12/01/19  1650 12/01/19  1135 12/01/19  0815  11/29/19  1024   PH 7.45 7.46* 7.40 7.38   < > 7.35   PCO2 56* 49* 53* 58*   < > 57*   PO2 87 84 100 73*   < > 93   HCO3 39* 35* 32* 34*   < > 32*   O2PER 75 75%  --   --   --  Room Air    < > = values in this interval not displayed.       General:   Following basic commands   Neurologic:   Sedation: lightly sedated and awakable   HEENT:   Head is atraumatic      Lungs:   CTAB   Cardiovascular:   Irregular rhythm. tachycardic  Normal S1,S2, and no gallop, rub or murmur   Abdomen:   Distended:  No.   Bowel sound present and normal: Yes .   Soft: Yes . Tender: No.   Rebound:tendeness or guarding present No   Extremities:   Clubbing present: No,   Edema present: Yes ,   Acrocyanosis present: No,   Mottling present: No,   Normal capillary refill: Yes    Skin:   Warm, dry.  Excoriated rash on left leg. Rash in intertriginous regions unchanged.    Lines/Drain:    Central line present: Yes   Arterial line present: no  External ventricular Drain present: No  Chest tube present: No  LUCY present: No  PEG present: No            Data:   All data and imaging reviewed.     ROUTINE ICU LABS (Last four results)  CMP  Recent Labs   Lab 12/04/19  0500 12/03/19  1600 12/03/19  1300 12/03/19  0700 12/03/19  0530 12/02/19  1310 12/02/19  1214 12/02/19  0600  11/30/19  0525 11/29/19  0450  11/28/19  1406   * 150* 143  --  149* 144 Canceled, Test credited 139   < > 136 138   < > 138   POTASSIUM 3.9 3.8 6.0* 2.5* 3.2* 3.1* Canceled, Test credited 3.8   < > 5.5* 4.9   < > 7.1*   CHLORIDE 103 113* 103  --  105 102 Canceled, Test credited 100   < > 101 104   < > 102   CO2 40* 34* 39*  --  37* 39* Canceled, Test credited 35*   < > 31 30   < > 31   ANIONGAP 3 3 1*  --  7 3 Canceled, Test credited 4   < > 4 4   < > 5   * 126* 147*  --  100* 139* Canceled, Test credited 100*   < > 242* 224*   < > 78   BUN 49* 46* 54*  --  46* 45* Canceled, Test credited 49*   < > 52* 50*   <  > 43*   CR 0.91 0.78 0.94  --  0.87 0.95 Canceled, Test credited 0.99   < > 1.47* 1.97*   < > 2.36*   GFRESTIMATED 70 84 67  --  74 66 Canceled, Test credited 63   < > 39* 27*   < > 22*   GFRESTBLACK 81 >90 78  --  85 77 Canceled, Test credited 73   < > 45* 32*   < > 26*   ANIBAL 8.2* 6.4* 8.4*  --  7.6* 8.0* Canceled, Test credited 7.7*   < > 8.5 8.1*   < > 9.3   MAG  --   --   --  1.2*  --  1.9 Canceled, Test credited 1.6  --   --   --   --  2.1   PHOS  --   --   --   --   --   --   --  2.5  --   --   --   --  9.4*   PROTTOTAL 5.4*  --   --   --  5.2*  --   --   --   --  5.5* 5.2*   < > 7.1   ALBUMIN 2.0*  --   --   --  2.1*  --   --   --   --  2.1* 2.2*   < > 3.2*   BILITOTAL 0.6  --   --   --  0.5  --   --   --   --  0.6 0.3   < > 0.9   ALKPHOS 75  --   --   --  76  --   --   --   --  113 115   < > 163*   AST 25  --   --   --  30  --   --   --   --  217* 256*   < > 126*   ALT 28  --   --   --  33  --   --   --   --  82* 70*   < > 42    < > = values in this interval not displayed.     CBC  Recent Labs   Lab 12/04/19  0500 12/03/19  0530 11/30/19  0525 11/29/19  0450   WBC 10.0 8.1 11.4* 8.1   RBC 5.59* 5.33* 5.13 4.87   HGB 18.1* 17.2* 16.5* 15.6   HCT 59.1* 55.2* 52.9* 53.2*   * 104* 103* 109*   MCH 32.4 32.3 32.2 32.0   MCHC 30.6* 31.2* 31.2* 29.3*   RDW 15.7* 15.3* 15.3* 15.4*   PLT 90* 98* 161 162     INR  Recent Labs   Lab 11/28/19  1406   INR 1.40*     Arterial Blood Gas  Recent Labs   Lab 12/03/19  1208 12/01/19  1650 12/01/19  1135 12/01/19  0815  11/29/19  1024   PH 7.45 7.46* 7.40 7.38   < > 7.35   PCO2 56* 49* 53* 58*   < > 57*   PO2 87 84 100 73*   < > 93   HCO3 39* 35* 32* 34*   < > 32*   O2PER 75 75%  --   --   --  Room Air    < > = values in this interval not displayed.       All cultures:  Recent Labs   Lab 12/03/19  1813 12/03/19  1740 12/03/19  1700 11/29/19  1553 11/28/19  1612 11/28/19  1439 11/28/19  1406   CULT No growth after 7 hours PENDING  PENDING No growth after 10 hours Heavy  growth  Corynebacterium striatum  Identification obtained by MALDI-TOF mass spectrometry research use only database. Test   characteristics determined and verified by the [facility]   ([facility]) [place], [place].  Susceptibility testing not routinely done  * >100,000 colonies/mL  Escherichia coli  *  50,000 to 100,000 colonies/mL  Klebsiella pneumoniae  *  10,000 to 50,000 colonies/mL  Strain 2  Escherichia coli  * No growth No growth     No results found for this or any previous visit (from the past 24 hour(s)).              Billing: This patient is critically ill: Yes. Total critical care time today 45 min. This does not include time spent on procedures. Critical care interventions are managing hypoxemic respiratory failure with mechanical ventilation    Anthony Carmen MD  Pulmonary & Critical Care Medicine  [facility]

## 2019-12-04 NOTE — PROGRESS NOTES
Respiratory care note: assisted with bronch today.S'xed sample for lab testing retuned to full support for day.Rafi

## 2019-12-05 VITALS
HEART RATE: 146 BPM | BODY MASS INDEX: 41.02 KG/M2 | HEIGHT: 71 IN | WEIGHT: 293 LBS | TEMPERATURE: 107.1 F | OXYGEN SATURATION: 83 % | SYSTOLIC BLOOD PRESSURE: 102 MMHG | DIASTOLIC BLOOD PRESSURE: 63 MMHG

## 2019-12-05 LAB
BACTERIA SPEC CULT: ABNORMAL
BACTERIA SPEC CULT: NO GROWTH
KOH PREP SPEC: NORMAL
Lab: NORMAL
SPECIMEN SOURCE: ABNORMAL
SPECIMEN SOURCE: NORMAL
SPECIMEN SOURCE: NORMAL

## 2019-12-05 ASSESSMENT — ACTIVITIES OF DAILY LIVING (ADL)
ADLS_ACUITY_SCORE: 19

## 2019-12-05 NOTE — PLAN OF CARE
Pt unstable with increased hypotension and pressor needs, afib with HR up to 170's-200's and frequent runs of VT, decreasing SpO2 despite FiO2 100% on vent. Attempted to cool pt for max temp 43.5 C. Dr. Shaffer at bedside, Dr. Chakraborty at bedside to perform echo. CXR, EKG, ABG, labs done. Synchronized cardioverted with improved HR/rhythm but no improvement in O2 needs and only brief improvement in BP before further hypotension requiring addition of epinephrine (vasopressin and levophed already infusing at max rate). Despite fluid boluses and multiple IVP meds (calcium, magnesium, dantrolene, etc) pt converted to v-fib. Please see code blue documentation. ETT suctioned for 800 mL blood. Pt pronounced dead by Dr. Shaffer at 22:35.    Staff attempted to contact spouse multiple times. Finally spouse reached at 2307 and notified of pt's death.

## 2019-12-05 NOTE — PROGRESS NOTES
ICU Progress Note:      Asked by bedside nurse to come to patient room to assess for hypotension.  Ms. Valle had previously been hemodynamically stable off of vasopressors and was getting diuresed for right heart failure.  Chart review and discussion with nursing suggests she has been in a-fib with RVR, with rate in the low 100s for the last few days and has had increasing fever curve, most recently up to 103F. I/O net negative 4L since admission. Patient was noted to be hypotensive on cuff pressures to 70s/40s which is a significant change from previous.  Persistent a-fib with RVR, with increased heart rates sustained into the 140s-160s with frequent ectopy.       Sats 94% on Peep 16 and FiO2 60%.  Last gas, venous 7.45/53/38 at 12:30 today    Last K 3.9 this morning, Mg 1.2 yesterday morning    Blood cultures NGTD from 12/3; bronchial cultures from this morning are pending but with negative gram stain.    Interventions done by me:  -500 mL LR x1 (given without significant change in hemodynamics)  -Start levophed  -Add vasopressin given right heart dysfunction and for levophed sparing effect in the setting of a-fib with RVR  -Load with amiodarone and continue drip; cardioversion considered but felt unlikely to have sustained efficacy given atrial fibrillation of critical illness, bi-atrial enlargement present on echo, and likely persistent need for vasopressors  -2 g IV magnesium now  -Repeat Mg, BMP, and lactic acid  -Replace arterial line for closer hemodynamic monitoring  -Decrease PEEP (to 14) to decrease intrathoracic pressure and maximize preload      45 minutes critical care time outside of procedures, in addition to time already billed today.    Joselin Borden MD  Pulmonary and Critical Care Medicine

## 2019-12-05 NOTE — PROGRESS NOTES
ICU Staff-TELE ICU Note:    Called by bedside nursing 2130 for patient with episodic v-tach, SVT with 's with SBP 70's-80/s and Sp02 in mid 80's.    Bedside assessment revealed questionable decreased BS on R.   Vent graphics with mild airtrapping and increased PIP without increased Pplat.  In line suctioned unrevealing for any secretions/mucus plugs.    Stat CXR-worsening bilateral infiltrates L>R. Patient continue with above a-fib with RVR/SVT and hypotension. Administered synchronized shock 100 L X 1 with improvement in -accelerated junctional rhythm.    Repeat EKG X 5 revealed slight ST elevation V1 +2 and subsequent ST depression inferior and laterally. Patient remained hypotensive despite controlled HR, and hypoxic with evidence of airtrapping. Administered 1 dose vecuronium and vent adjustments made in attempt to improve oxygenation. Veletri re-started.    Patient on max dosing vasopressin/norepinephrine gtts, administered stress dose hydrocortisone. Epinephrine gtt added. Stat ECHO with Dr. Chakraborty (Cardiology) in attendance revealed again a very dilated non-functional/akinetic RV and large RA with shift into the LA, flattening of the intervntircular septum with a resultant hyperdynamic LV without RWMA. IVC was small. Given the above (and in discussion with Dr. Chakraborty) gave further fluid both crystalloid and albumin. He felt no evidence of acute MI.    Just prior to the above-after initial resuscitation-patient noted to have rapidly climbing temp to 106 then 109 by esophageal probe.   Cooling packs immediately applied-source unclear at this time-thought malignant hyperthermia although no medication given (including vecuronium/cis-atracurium) that is known to cause this-Dantrolene administered.     Patient continued to have worsening Sp02 and repeat suctioning revealed marked continues ambrocio hemorrhage from ETT both through in-line and when 14F suction catheter placed down ETT (total 800 ml ambrocio  blood recovered).   Patient had been on heparin gtt. Stopped and protamine administered. Patient's rhythm continued to degenerate and the patient was actively coded/CPR with 5 rounds of epi, shock X 3 for total of 20 minutes until called 2235.    Critical Care Total Time 180 minutes-exclusive of procedures but inclusive of active resuscitation for the above.    Leeann Shaffer MD  #[id number]

## 2019-12-05 NOTE — PLAN OF CARE
Pt is febrile and unable to make needs known.  Pt had a bronch this morning and has had several vent changes since to find the correct peep.  Pt had an esophageal pressure monitor placed and removed due to not giving sufficient information to help with vent settings. Pt has a potts in place with great UO this morning but tapered off later this afternoon.  Pt around 1615 suddenly became hypotensive and precedex and dilaudid drips stopped immidiately, MD Dr. Pendleton called to bedside to assist.  Pt was given 500 ml bolus of LR, 2 grams MG, amiodarone bolus with drip to follow.  Pt was started on levophed and vasopressin and was unable to titrate as desired due to severe hypotension and MD gave verbal to titrate quickly.  Pt had a sudden output of diarrhea around 1855.  Pt was turned and had oral cares every couple hours.  Pt has had several labs that are bad and needing to be redrawn.

## 2019-12-05 NOTE — PROGRESS NOTES
Cardiology on-call note:    I was called by Dr. Shaffer in the ICU for urgent evaluation of abnormal ECG with concern regarding acute myocardial infarction.    Patient's history was reviewed in detail.  Throughout the day today she became gradually hypotensive and increasingly tachycardic with atrial fibrillation with rapid ventricular response.  She was treated with emergency cardioversion as her heart rate jairon to 232 bpm, with conversion to SVT, which appears to be an accelerated junctional rhythm with retrograde P waves.    Her ECGs were reviewed.  There is slight ST elevation in leads V1 and V2 with some ST segment depression in the inferior and lateral leads II, 3, F, 1 and aVL.    A bedside echocardiogram was performed and reviewed by me in real-time.  The right ventricle is severely dilated and akinetic with severe enlargement of the right atrium which appears to be causing the atrial septum to shift toward the left and causing the left atrium to be small.  There is significant flattening of the interventricular septum and the left ventricle chamber is small with hyperdynamic function.  The ejection fraction appears to be greater than 70% with no identifiable regional wall motion and normalities.  Left ventricular hypertrophy appears to be present.  There are no significant valvular normalities identifiable.  Left ventricular outflow tract obstruction could not be excluded.  The IVC was measured at 2.0 cm with respiratory variation despite mechanical ventilation and positive end expiratory pressure.  This would suggest a relatively low CVP, compared to her previous echocardiogram from 11/29/2019 when her IVC was measured at over 3 cm and did not vary significantly with respiration.  On the current echocardiogram, the LV was smaller and more hyperdynamic as well.    The patient is severely hypotensive on multiple pressors and remains tachycardic with heart rate in the 130s to 160s.  Based on the echocardiogram  and appearance of the ECG, I do not believe that the patient is experiencing an ST elevation myocardial infarction.  Instead, I suggest that the ECG changes are primarily due to right ventricular strain.    I discussed the findings with Dr. Shaffer and suggested that we try to provide additional volume expansion to help improve right ventricular preload and increased left ventricular filling volumes to improve cardiac output.  Albumin and IV fluids were then given but providing minimal benefit.  Amiodarone was discontinued.    The patient's temperature was then noted to be climbing and reached 44  C.  The patient was becoming gradually more hypoxic with oxygen saturations in the low 70s.  Chest x-ray showed significant increase in bilateral pulmonary infiltrates.  Lamin blood was being aspirated from the ET tube.  Resuscitation efforts were underway by the ICU team.  Unfortunately, these efforts were unsuccessful and the patient .    Critical care time 45 minutes    Bridger Chakrabroty MD cardiology

## 2019-12-05 NOTE — PLAN OF CARE
Patient's spouse arrived at 01:48 where intensivist discussed situation at bedside. Given pt belongings.

## 2019-12-05 NOTE — PROGRESS NOTES
Spontaneous Breathing Trial    Criteria met for SBT (Y/N): yes    Settings:    PS: 5   PEEP: 5   FiO2 40    Measured Parameters:    RR: 23   Tidal volume: 510   RSBI: 45    Patient tolerance:  Well     Plan: another ps is progress. Pt doing well. We'll continue to monitor.

## 2019-12-06 LAB
ASPERGILLUS GALACTOMANNAN ANTIGEN BAL: NEGATIVE
BACTERIA SPEC CULT: NO GROWTH
GALACTOMANNAN AG SERPL-ACNC: 0.05
INTERPRETATION ECG - MUSE: NORMAL
INTERPRETATION ECG - MUSE: NORMAL
M PNEUMO DNA SPEC QL NAA+PROBE: NOT DETECTED
SPECIMEN SOURCE: NORMAL
SPECIMEN SOURCE: NORMAL

## 2019-12-07 LAB
BACTERIA SPEC CULT: ABNORMAL
L PNEUMO DNA SPEC QL NAA+PROBE: NOT DETECTED
LEGIONELLA DNA SPEC NAA+PROBE: NOT DETECTED
Lab: ABNORMAL
SPECIMEN SOURCE: ABNORMAL
SPECIMEN SOURCE: NORMAL

## 2019-12-07 NOTE — DISCHARGE SUMMARY
[facility] Discharge Summary    Hanna Valle MRN# [id number]   Age: 59 year old YOB: 1960     Date of Admission:  11/28/2019  Date of Death::  12/5/2019  Admitting Physician:  No admitting provider for patient encounter.  Discharge Physician:  Devonte Carmen MD            Admission Diagnoses:   Hyperkalemia [E87.5]  Pleural effusion [J90]  Hypoxia [R09.02]  Pulmonary hypertension (H) [I27.20]   Acute respiratory failure with hypercapnia (H) [J96.02]  Septic shock (H) [A41.9, R65.21]  Acute renal failure, unspecified acute renal failure type (H) [N17.9]  Pneumonia due to infectious organism, unspecified laterality, unspecified part of lung [J18.9]         Cause of Death:   Septic Shock   Influenza A  Right heart Failure         Discharge Medications:     Not Applicable          Consultations:   Cardiology          Brief History of Illness:   58 years old female past medical history of morbid obesity, COPD not on home oxygen, actively smoking, diastolic heart failure, hypertension, CKD, depression, who presented with acute on chronic hypoxic hypercarbic respiratory failure with sepsis secondary to pneumonia and acute kidney injury on BiPAP.    The patient's  reports that he and the patient have had a cold with a cough over the couple of weeks. He also notes that her legs have been progressively discoloring over the past week as well, and the patient started taking her 's Lasix prescription a few weeks ago in an effort to help with her leg swelling that has been worsening over the last few months. However, three hours ago her lips and nose began turning blue and she became short of breath with weakness so EMS was called. On their arrival her oxygen was 55-60% and she was given nebulizations.   At presentation she found to have severe hypoxia 60% ABG shows severe hypercarbia with CO2 more than 100 with pH 7.02 also found to be hypotensive with ORLY creatinine 2.5 hyperkalemia  7 chest x-ray and CT show a pneumonia.           Hospital Course:   She was admitted to the hospital with influenza A, hypoxemic and hypercapnic respiratory failure requiring intubation and septic shock.  She was in severe right heart failure.  An echocardiogram demonstrated a large, dysfunctional right ventricle.  After she showed a bit of recovery from a respiratory standpoint, she was diuresed in attempts to improve her right heart function.      On hospital day 6, she developed a fever. Antibiotics were started.  The temperature continued to rise and she developed worsening tachyarrhythmias.  She developed shock again and went into acute renal failure.  All of this pointed to a new infection. She was started on vasopressors, developed pulmonary hemorrhage and eventually had a cardiac arrest.  CPR was performed, but she could not be resuscitated.      Cause of death was likely septic shock in the setting of right ventricular failure.            Discharge Instructions and Follow-Up:   Not applicable       Discharge Disposition:   Patient  in the hospital.     Devonte Carmen MD

## 2019-12-09 LAB
BACTERIA SPEC CULT: NO GROWTH
Lab: NORMAL
SPECIMEN SOURCE: NORMAL

## 2020-01-01 LAB
FUNGUS SPEC CULT: NORMAL
SPECIMEN SOURCE: NORMAL

## 2023-05-12 NOTE — PROCEDURES
[facility]    Arterial line placement  Date/Time: 12/4/2019 6:49 PM  Performed by: Houston Tam MD  Authorized by: Houston Tam MD     UNIVERSAL PROTOCOL   Site Marked: NA  Prior Images Obtained and Reviewed:  NA  Required items: Required blood products, implants, devices and special equipment available    Patient identity confirmed:  Anonymous protocol, patient vented/unresponsive, arm band, provided demographic data and hospital-assigned identification number  Patient was reevaluated immediately before administering moderate or deep sedation or anesthesia  Confirmation Checklist:  Patient's identity using two indicators, relevant allergies, correct equipment/implants were available and procedure was appropriate and matched the consent or emergent situation  Preparation: Patient was prepped and draped in usual sterile fashion    Indication: multiple ABGs respiratory failure hemodynamic monitoring  Location: Location: left axillary.        SEDATION    Patient Sedated: Yes    Sedation:  See MAR for details  Vital signs: Vital signs monitored during sedation      PROCEDURE DETAILS    Needle Gauge:  18  Seldinger technique: Seldinger technique used    Number of Attempts:  2  Post-procedure:  Line sutured and dressing applied    PROCEDURE   Patient Tolerance:  Patient tolerated the procedure well with no immediate complications  Describe Procedure: Initially attempted left radial artery however there appeared to be thrombus within the vessels extending towards the brachial artery.  Moved to the left axillary artery and was able to cannulate on the second attempt.  Low BP, poor arterial return however once attached to the transducer there was a good arterial wave form.  Length of time physician/provider present for 1:1 monitoring during sedation: 30           no...